# Patient Record
Sex: MALE | Race: WHITE | NOT HISPANIC OR LATINO | Employment: PART TIME | URBAN - METROPOLITAN AREA
[De-identification: names, ages, dates, MRNs, and addresses within clinical notes are randomized per-mention and may not be internally consistent; named-entity substitution may affect disease eponyms.]

---

## 2018-07-06 ENCOUNTER — HISTORICAL (OUTPATIENT)
Dept: RADIOLOGY | Facility: HOSPITAL | Age: 28
End: 2018-07-06

## 2022-04-11 ENCOUNTER — HISTORICAL (OUTPATIENT)
Dept: ADMINISTRATIVE | Facility: HOSPITAL | Age: 32
End: 2022-04-11
Payer: MEDICAID

## 2022-04-29 VITALS
WEIGHT: 149.94 LBS | HEIGHT: 67 IN | BODY MASS INDEX: 23.53 KG/M2 | SYSTOLIC BLOOD PRESSURE: 119 MMHG | DIASTOLIC BLOOD PRESSURE: 71 MMHG

## 2022-05-10 ENCOUNTER — OFFICE VISIT (OUTPATIENT)
Dept: FAMILY MEDICINE | Facility: CLINIC | Age: 32
End: 2022-05-10
Payer: MEDICAID

## 2022-05-10 VITALS
TEMPERATURE: 98 F | SYSTOLIC BLOOD PRESSURE: 137 MMHG | DIASTOLIC BLOOD PRESSURE: 80 MMHG | HEART RATE: 71 BPM | OXYGEN SATURATION: 100 % | RESPIRATION RATE: 18 BRPM | BODY MASS INDEX: 22.82 KG/M2 | HEIGHT: 66 IN | WEIGHT: 142 LBS

## 2022-05-10 DIAGNOSIS — F32.89 OTHER DEPRESSION: Primary | ICD-10-CM

## 2022-05-10 DIAGNOSIS — Z00.00 ENCOUNTER FOR WELLNESS EXAMINATION: ICD-10-CM

## 2022-05-10 DIAGNOSIS — B19.20 HEPATITIS C VIRUS INFECTION WITHOUT HEPATIC COMA, UNSPECIFIED CHRONICITY: ICD-10-CM

## 2022-05-10 PROBLEM — F32.A DEPRESSION: Status: ACTIVE | Noted: 2022-05-10

## 2022-05-10 LAB
APPEARANCE UR: CLEAR
BACTERIA #/AREA URNS AUTO: ABNORMAL /HPF
BASOPHILS # BLD AUTO: 0.07 X10(3)/MCL (ref 0–0.2)
BASOPHILS NFR BLD AUTO: 0.9 %
BILIRUB UR QL STRIP.AUTO: NEGATIVE MG/DL
COLOR UR AUTO: ABNORMAL
EOSINOPHIL # BLD AUTO: 0.07 X10(3)/MCL (ref 0–0.9)
EOSINOPHIL NFR BLD AUTO: 0.9 %
ERYTHROCYTE [DISTWIDTH] IN BLOOD BY AUTOMATED COUNT: 11.6 % (ref 11.5–17)
EST. AVERAGE GLUCOSE BLD GHB EST-MCNC: 91.1 MG/DL
GLUCOSE UR QL STRIP.AUTO: NORMAL MG/DL
HBA1C MFR BLD: 4.8 %
HCT VFR BLD AUTO: 46.9 % (ref 42–52)
HGB BLD-MCNC: 15.6 GM/DL (ref 14–18)
HIV 1+2 AB+HIV1 P24 AG SERPL QL IA: NONREACTIVE
HYALINE CASTS #/AREA URNS LPF: ABNORMAL /LPF
IMM GRANULOCYTES # BLD AUTO: 0.07 X10(3)/MCL (ref 0–0.02)
IMM GRANULOCYTES NFR BLD AUTO: 0.9 % (ref 0–0.43)
KETONES UR QL STRIP.AUTO: NEGATIVE MG/DL
LEUKOCYTE ESTERASE UR QL STRIP.AUTO: NEGATIVE UNIT/L
LYMPHOCYTES # BLD AUTO: 1.34 X10(3)/MCL (ref 0.6–4.6)
LYMPHOCYTES NFR BLD AUTO: 16.3 %
MCH RBC QN AUTO: 30.1 PG (ref 27–31)
MCHC RBC AUTO-ENTMCNC: 33.3 MG/DL (ref 33–36)
MCV RBC AUTO: 90.4 FL (ref 80–94)
MONOCYTES # BLD AUTO: 0.81 X10(3)/MCL (ref 0.1–1.3)
MONOCYTES NFR BLD AUTO: 9.9 %
NEUTROPHILS # BLD AUTO: 5.9 X10(3)/MCL (ref 2.1–9.2)
NEUTROPHILS NFR BLD AUTO: 71.1 %
NITRITE UR QL STRIP.AUTO: NEGATIVE
NRBC BLD AUTO-RTO: 0 %
PH UR STRIP.AUTO: 5.5 [PH]
PLATELET # BLD AUTO: 298 X10(3)/MCL (ref 130–400)
PMV BLD AUTO: 9.7 FL (ref 9.4–12.4)
PROT UR QL STRIP.AUTO: ABNORMAL MG/DL
RBC # BLD AUTO: 5.19 X10(6)/MCL (ref 4.7–6.1)
RBC #/AREA URNS AUTO: ABNORMAL /HPF
RBC UR QL AUTO: NEGATIVE UNIT/L
SP GR UR STRIP.AUTO: 1.03
SQUAMOUS #/AREA URNS LPF: ABNORMAL /HPF
UROBILINOGEN UR STRIP-ACNC: NORMAL MG/DL
WBC # SPEC AUTO: 8.2 X10(3)/MCL (ref 4.5–11.5)
WBC #/AREA URNS AUTO: ABNORMAL /HPF

## 2022-05-10 PROCEDURE — 3008F BODY MASS INDEX DOCD: CPT | Mod: CPTII,,,

## 2022-05-10 PROCEDURE — 81001 URINALYSIS AUTO W/SCOPE: CPT

## 2022-05-10 PROCEDURE — 87389 HIV-1 AG W/HIV-1&-2 AB AG IA: CPT

## 2022-05-10 PROCEDURE — 3075F PR MOST RECENT SYSTOLIC BLOOD PRESS GE 130-139MM HG: ICD-10-PCS | Mod: CPTII,,,

## 2022-05-10 PROCEDURE — 1160F PR REVIEW ALL MEDS BY PRESCRIBER/CLIN PHARMACIST DOCUMENTED: ICD-10-PCS | Mod: CPTII,,,

## 2022-05-10 PROCEDURE — 85025 COMPLETE CBC W/AUTO DIFF WBC: CPT

## 2022-05-10 PROCEDURE — 1160F RVW MEDS BY RX/DR IN RCRD: CPT | Mod: CPTII,,,

## 2022-05-10 PROCEDURE — 87522 HEPATITIS C REVRS TRNSCRPJ: CPT

## 2022-05-10 PROCEDURE — 99204 OFFICE O/P NEW MOD 45 MIN: CPT | Mod: S$PBB,,,

## 2022-05-10 PROCEDURE — 3075F SYST BP GE 130 - 139MM HG: CPT | Mod: CPTII,,,

## 2022-05-10 PROCEDURE — 3079F DIAST BP 80-89 MM HG: CPT | Mod: CPTII,,,

## 2022-05-10 PROCEDURE — 36415 COLL VENOUS BLD VENIPUNCTURE: CPT

## 2022-05-10 PROCEDURE — 3008F PR BODY MASS INDEX (BMI) DOCUMENTED: ICD-10-PCS | Mod: CPTII,,,

## 2022-05-10 PROCEDURE — 1159F PR MEDICATION LIST DOCUMENTED IN MEDICAL RECORD: ICD-10-PCS | Mod: CPTII,,,

## 2022-05-10 PROCEDURE — 99204 PR OFFICE/OUTPT VISIT, NEW, LEVL IV, 45-59 MIN: ICD-10-PCS | Mod: S$PBB,,,

## 2022-05-10 PROCEDURE — 1159F MED LIST DOCD IN RCRD: CPT | Mod: CPTII,,,

## 2022-05-10 PROCEDURE — 83036 HEMOGLOBIN GLYCOSYLATED A1C: CPT

## 2022-05-10 PROCEDURE — 99214 OFFICE O/P EST MOD 30 MIN: CPT | Mod: PBBFAC,PN

## 2022-05-10 PROCEDURE — 3079F PR MOST RECENT DIASTOLIC BLOOD PRESSURE 80-89 MM HG: ICD-10-PCS | Mod: CPTII,,,

## 2022-05-10 NOTE — PROGRESS NOTES
Patient Name: Ryan Wild   : 1990  MRN: [id number]     Subjective:   Patient ID: Ryan Wild is a 31 y.o. male.    Chief Complaint:   Chief Complaint   Patient presents with    Establish Care        HPI: 05/10/2022:  Patient presents to clinic today to establish care.  He has been out of [facility] for 7 months, states he has been sober from alcohol and narcotics for 3 years.  [place]  He then later was in [facility] for the past 3 years, states that he was told he had hepatitis he again there but they do not treat while they are in [facility].  He has had no formal workup in the chart that I am aware of.  Patient also was born with 1 kidney, he has never followed up with any doctors for this and his adult life.  He also states he was diagnosed with bipolar many years ago.  He was also read diagnosed with bipolar when he was in [facility] by Dr. Jain.  At 1 point in time he took Seroquel but he has been off of any and all medicine for at least 7 months.       ROS:  Review of Systems   Constitutional: Negative for chills, fever and weight loss.   HENT: Negative for ear discharge, nosebleeds and tinnitus.    Eyes: Negative for blurred vision, photophobia and pain.   Respiratory: Negative for cough, shortness of breath, wheezing and stridor.    Cardiovascular: Negative for chest pain, palpitations and orthopnea.   Gastrointestinal: Negative for abdominal pain, heartburn and nausea.   Genitourinary: Negative for dysuria, frequency, hematuria and urgency.   Musculoskeletal: Negative for falls and myalgias.   Skin: Negative for itching and rash.   Neurological: Negative for dizziness, sensory change, speech change, focal weakness, seizures, weakness and headaches.   Endo/Heme/Allergies: Negative for environmental allergies. Does not bruise/bleed easily.   Psychiatric/Behavioral: Negative for hallucinations and suicidal ideas.      History:     Past Medical History:   Diagnosis  "Date    Bipolar disorder       Past Surgical History:   Procedure Laterality Date    TONSILLECTOMY       Family History   Family history unknown: Yes      Social History     Tobacco Use    Smoking status: Current Every Day Smoker     Types: Vaping with nicotine    Smokeless tobacco: Current User   Substance and Sexual Activity    Alcohol use: Not Currently    Drug use: Not Currently     Types: Heroin, Methamphetamines    Sexual activity: Yes     Partners: Female        Allergies: Review of patient's allergies indicates:  No Known Allergies  Objective:     Vitals:    05/10/22 1320   BP: 137/80   Pulse: 71   Resp: 18   Temp: 98.4 °F (36.9 °C)   SpO2: 100%   Weight: 64.4 kg (141 lb 15.6 oz)   Height: 5' 6" (1.676 m)     Body mass index is 22.92 kg/m².     Physical Examination:   Physical Exam  Vitals reviewed.   Constitutional:       Appearance: Normal appearance. He is normal weight.   HENT:      Head: Normocephalic.      Right Ear: Tympanic membrane, ear canal and external ear normal.      Left Ear: Tympanic membrane, ear canal and external ear normal.      Nose: Nose normal.      Mouth/Throat:      Mouth: Mucous membranes are moist.      Pharynx: Oropharynx is clear.   Eyes:      Extraocular Movements: Extraocular movements intact.      Conjunctiva/sclera: Conjunctivae normal.      Pupils: Pupils are equal, round, and reactive to light.   Cardiovascular:      Rate and Rhythm: Normal rate and regular rhythm.      Pulses: Normal pulses.      Heart sounds: Normal heart sounds.   Pulmonary:      Effort: Pulmonary effort is normal.      Breath sounds: Normal breath sounds.   Abdominal:      General: Abdomen is flat. Bowel sounds are normal.      Palpations: Abdomen is soft.   Musculoskeletal:         General: Normal range of motion.      Cervical back: Normal range of motion and neck supple.   Skin:     General: Skin is warm and dry.   Neurological:      General: No focal deficit present.      Mental Status: He is " alert and oriented to person, place, and time.   Psychiatric:         Mood and Affect: Mood normal.         Behavior: Behavior normal.         Assessment:     Problem List Items Addressed This Visit        Psychiatric    Depression - Primary    Relevant Orders    Ambulatory referral/consult to Psychiatry       GI    Hepatitis C virus infection without hepatic coma    Relevant Orders    Hepatitis C Genotype    Hepatitis C Viral(HCV) RNA, Quant Real-Time PCR w/Reflexs    US Doppler Abd/Retroperitoneal Limited      Other Visit Diagnoses     Encounter for wellness examination        Relevant Orders    CBC Auto Differential    Comprehensive Metabolic Panel    Lipid Panel    T4, Free    TSH    Urinalysis    Hemoglobin A1C    HIV 1/2 Ag/Ab (4th Gen)          Plan:   Ryan was seen today for [facility] care.    Diagnoses and all orders for this visit:    Other depression  -     Ambulatory referral/consult to Psychiatry    Hepatitis C virus infection without hepatic coma, unspecified chronicity  -     Hepatitis C Genotype  -     Hepatitis C Viral(HCV) RNA, Quant Real-Time PCR w/Reflexs  -     US Doppler Abd/Retroperitoneal Limited; Future    Encounter for wellness examination  -     CBC Auto Differential  -     Comprehensive Metabolic Panel  -     Lipid Panel  -     T4, Free  -     TSH  -     Urinalysis  -     Hemoglobin A1C  -     HIV 1/2 Ag/Ab (4th Gen)       Follow up in about 2 weeks (around 5/24/2022) for AMIRA/PHQ, review labs.       This note was created with the assistance of a voice recognition software or phone dictation. There may be transcription errors as a result of using this technology however minimal. Effort has been made to assure accuracy of transcription but any obvious errors or omissions should be clarified with the author of the document

## 2022-05-12 LAB — HCV RNA SERPL NAA+PROBE-ACNC: ABNORMAL IU/ML

## 2022-05-13 DIAGNOSIS — B18.2 CHRONIC HEPATITIS C WITHOUT HEPATIC COMA: Primary | ICD-10-CM

## 2022-05-18 ENCOUNTER — TELEPHONE (OUTPATIENT)
Dept: HEPATOLOGY | Facility: CLINIC | Age: 32
End: 2022-05-18
Payer: MEDICAID

## 2022-05-18 NOTE — TELEPHONE ENCOUNTER
Adilene Dillon NP ordered that patient be scheduled for a hep c consult visit.  Patient quant positive.  I spoke with patient.  Virtual visit with PA Scheuermann scheduled 6/9/22; appt reminder notice mailed.

## 2022-05-23 PROBLEM — B19.20 HEPATITIS C VIRUS INFECTION WITHOUT HEPATIC COMA: Chronic | Status: ACTIVE | Noted: 2022-05-10

## 2022-05-23 PROBLEM — F32.A DEPRESSION: Chronic | Status: ACTIVE | Noted: 2022-05-10

## 2022-05-24 ENCOUNTER — OFFICE VISIT (OUTPATIENT)
Dept: FAMILY MEDICINE | Facility: CLINIC | Age: 32
End: 2022-05-24
Payer: MEDICAID

## 2022-05-24 DIAGNOSIS — F32.89 OTHER DEPRESSION: Chronic | ICD-10-CM

## 2022-05-24 DIAGNOSIS — B19.20 HEPATITIS C VIRUS INFECTION WITHOUT HEPATIC COMA, UNSPECIFIED CHRONICITY: ICD-10-CM

## 2022-05-24 DIAGNOSIS — B19.20 HEPATITIS C VIRUS INFECTION WITHOUT HEPATIC COMA, UNSPECIFIED CHRONICITY: Primary | ICD-10-CM

## 2022-05-24 PROCEDURE — 99213 PR OFFICE/OUTPT VISIT, EST, LEVL III, 20-29 MIN: ICD-10-PCS | Mod: 95,,,

## 2022-05-24 PROCEDURE — 99213 OFFICE O/P EST LOW 20 MIN: CPT | Mod: 95,,,

## 2022-05-24 NOTE — PROGRESS NOTES
Established Patient - Audio Only Telehealth Visit     The patient location is: his home in [place]  The chief complaint leading to consultation is: review labs  Visit type: Virtual visit with audio only (telephone)  Total time spent with patient: 15 min       The reason for the audio only service rather than synchronous audio and video virtual visit was related to technical difficulties or patient preference/necessity.     Each patient to whom I provide medical services by telemedicine is:  (1) informed of the relationship between the physician and patient and the respective role of any other health care provider with respect to management of the patient; and (2) notified that they may decline to receive medical services by telemedicine and may withdraw from such care at any time. Patient verbally consented to receive this service via voice-only telephone call.       HPI: 05/24/2022: Patient doing well with no complaints, reviewed blood work, He has since rescheduled his US and has already spoken with an intake nurse for his Hep C treatment. He continues to stay sober and is committed to turning his life around.        05/10/2022:  Patient presents to clinic today to establish care.  He has been out of [facility] for 7 months, states he has been sober from alcohol and narcotics for 3 years.  He was diagnosed with hepatitis C in 2015 when he went up [place].  He then later was in [facility] for the past 3 years, states that he was told he had hepatitis he again there but they do not treat while they are in [facility].  He has had no formal workup in the chart that I am aware of.  Patient also was born with 1 kidney, he has never followed up with any doctors for this and his adult life.  He also states he was diagnosed with bipolar many years ago.  He was also read diagnosed with bipolar when he was in [facility] by Dr. Jain.  At 1 point in time he took Seroquel but he has been off of any and all medicine for at least 7 months.       Assessment and plan:      Problem List Items Addressed This Visit        Psychiatric    Depression (Chronic)    Overview       Read positive daily meditations, avoid negative media, set healthy boundaries.  Exercise daily, keep consistent sleep pattern, eat a healthy diet.  Establish good social support, make changes to reduce stress.  Reports any symptoms of suicidal/homicidal ideations or self harm immediately, if clinic is closed go to nearest emergency room.             Current Assessment & Plan     Scheduled with psych on 6/1/22 at 8am for new patient appoitnemtn              GI    Hepatitis C virus infection without hepatic coma (Chronic)    Overview     US liver ordered. Scheduled 6/6/22 at 145   referred to Infectious Dz., appointment scheduled 6/9/22 at 1pm  Abstain from sex or use condoms. Do not share razors, toothbrushes, or needles. Do not attempt to donate blood.   Notify all sexual partners of active infection and need for evaluation/treatment.              Current Assessment & Plan      Latest Reference Range & Units 05/10/22 14:23   HCV RNA Detect/Quant Undetected IU/mL 68132 !                    Follow up in about 6 months (around 11/24/2022), or if needed sooner., for routine labs recheck, AMIRA/PHQ.  Start: 0720a  End: 0740a         This service was not originating from a related E/M service provided within the previous 7 days nor will  to an E/M service or procedure within the next 24 hours or my soonest available appointment.  Prevailing standard of care was able to be met in this audio-only visit.

## 2022-05-25 NOTE — TELEPHONE ENCOUNTER
I spoke with patient today.  He asked that consult appt with PA Scheuermann be cancelled on 6/9/22.  He states that he is following up locally with a provider for hep c treatment; done.

## 2022-07-01 ENCOUNTER — TELEPHONE (OUTPATIENT)
Dept: INFECTIOUS DISEASES | Facility: CLINIC | Age: 32
End: 2022-07-01
Payer: MEDICAID

## 2022-07-19 DIAGNOSIS — B19.20 HEPATITIS C VIRUS INFECTION WITHOUT HEPATIC COMA, UNSPECIFIED CHRONICITY: Primary | ICD-10-CM

## 2022-07-21 ENCOUNTER — OFFICE VISIT (OUTPATIENT)
Dept: BEHAVIORAL HEALTH | Facility: CLINIC | Age: 32
End: 2022-07-21
Payer: MEDICAID

## 2022-07-21 VITALS
RESPIRATION RATE: 20 BRPM | WEIGHT: 141.81 LBS | HEART RATE: 63 BPM | BODY MASS INDEX: 22.79 KG/M2 | SYSTOLIC BLOOD PRESSURE: 117 MMHG | OXYGEN SATURATION: 97 % | DIASTOLIC BLOOD PRESSURE: 73 MMHG | HEIGHT: 66 IN

## 2022-07-21 DIAGNOSIS — F11.21 OPIOID USE DISORDER, MODERATE, IN SUSTAINED REMISSION: ICD-10-CM

## 2022-07-21 DIAGNOSIS — F31.89 OTHER BIPOLAR DISORDER: Primary | ICD-10-CM

## 2022-07-21 DIAGNOSIS — F43.12 CHRONIC POST-TRAUMATIC STRESS DISORDER (PTSD): ICD-10-CM

## 2022-07-21 DIAGNOSIS — F15.21 AMPHETAMINE USE DISORDER, SEVERE, IN SUSTAINED REMISSION: ICD-10-CM

## 2022-07-21 PROBLEM — F32.A DEPRESSION: Chronic | Status: RESOLVED | Noted: 2022-05-10 | Resolved: 2022-07-21

## 2022-07-21 LAB
AMPHET UR QL SCN: NEGATIVE
BARBITURATE SCN PRESENT UR: NEGATIVE
BENZODIAZ UR QL SCN: NEGATIVE
CANNABINOIDS UR QL SCN: NEGATIVE
CHOLEST SERPL-MCNC: 148 MG/DL
CHOLEST/HDLC SERPL: 2 {RATIO} (ref 0–5)
COCAINE UR QL SCN: NEGATIVE
FENTANYL UR QL SCN: NEGATIVE
HDLC SERPL-MCNC: 66 MG/DL (ref 35–60)
LDLC SERPL CALC-MCNC: 68 MG/DL (ref 50–140)
MDMA UR QL SCN: NEGATIVE
OPIATES UR QL SCN: NEGATIVE
PCP UR QL: NEGATIVE
PH UR: 7 [PH] (ref 3–11)
SPECIFIC GRAVITY, URINE AUTO (.000) (OHS): 1.02 (ref 1–1.03)
TRIGL SERPL-MCNC: 72 MG/DL (ref 34–140)
TSH SERPL-ACNC: 1.27 UIU/ML (ref 0.35–4.94)
VLDLC SERPL CALC-MCNC: 14 MG/DL

## 2022-07-21 PROCEDURE — 3074F PR MOST RECENT SYSTOLIC BLOOD PRESSURE < 130 MM HG: ICD-10-PCS | Mod: CPTII,AF,HB, | Performed by: STUDENT IN AN ORGANIZED HEALTH CARE EDUCATION/TRAINING PROGRAM

## 2022-07-21 PROCEDURE — 80307 DRUG TEST PRSMV CHEM ANLYZR: CPT | Performed by: STUDENT IN AN ORGANIZED HEALTH CARE EDUCATION/TRAINING PROGRAM

## 2022-07-21 PROCEDURE — 1160F RVW MEDS BY RX/DR IN RCRD: CPT | Mod: CPTII,AF,HB, | Performed by: STUDENT IN AN ORGANIZED HEALTH CARE EDUCATION/TRAINING PROGRAM

## 2022-07-21 PROCEDURE — 3078F DIAST BP <80 MM HG: CPT | Mod: CPTII,AF,HB, | Performed by: STUDENT IN AN ORGANIZED HEALTH CARE EDUCATION/TRAINING PROGRAM

## 2022-07-21 PROCEDURE — 1159F MED LIST DOCD IN RCRD: CPT | Mod: CPTII,AF,HB, | Performed by: STUDENT IN AN ORGANIZED HEALTH CARE EDUCATION/TRAINING PROGRAM

## 2022-07-21 PROCEDURE — 36415 COLL VENOUS BLD VENIPUNCTURE: CPT | Performed by: STUDENT IN AN ORGANIZED HEALTH CARE EDUCATION/TRAINING PROGRAM

## 2022-07-21 PROCEDURE — 99213 OFFICE O/P EST LOW 20 MIN: CPT | Mod: PBBFAC,PN | Performed by: STUDENT IN AN ORGANIZED HEALTH CARE EDUCATION/TRAINING PROGRAM

## 2022-07-21 PROCEDURE — 83718 ASSAY OF LIPOPROTEIN: CPT | Performed by: STUDENT IN AN ORGANIZED HEALTH CARE EDUCATION/TRAINING PROGRAM

## 2022-07-21 PROCEDURE — 3074F SYST BP LT 130 MM HG: CPT | Mod: CPTII,AF,HB, | Performed by: STUDENT IN AN ORGANIZED HEALTH CARE EDUCATION/TRAINING PROGRAM

## 2022-07-21 PROCEDURE — 84443 ASSAY THYROID STIM HORMONE: CPT | Performed by: STUDENT IN AN ORGANIZED HEALTH CARE EDUCATION/TRAINING PROGRAM

## 2022-07-21 PROCEDURE — 1160F PR REVIEW ALL MEDS BY PRESCRIBER/CLIN PHARMACIST DOCUMENTED: ICD-10-PCS | Mod: CPTII,AF,HB, | Performed by: STUDENT IN AN ORGANIZED HEALTH CARE EDUCATION/TRAINING PROGRAM

## 2022-07-21 PROCEDURE — 3008F PR BODY MASS INDEX (BMI) DOCUMENTED: ICD-10-PCS | Mod: CPTII,AF,HB, | Performed by: STUDENT IN AN ORGANIZED HEALTH CARE EDUCATION/TRAINING PROGRAM

## 2022-07-21 PROCEDURE — 3078F PR MOST RECENT DIASTOLIC BLOOD PRESSURE < 80 MM HG: ICD-10-PCS | Mod: CPTII,AF,HB, | Performed by: STUDENT IN AN ORGANIZED HEALTH CARE EDUCATION/TRAINING PROGRAM

## 2022-07-21 PROCEDURE — 99204 PR OFFICE/OUTPT VISIT, NEW, LEVL IV, 45-59 MIN: ICD-10-PCS | Mod: S$PBB,AF,HB, | Performed by: STUDENT IN AN ORGANIZED HEALTH CARE EDUCATION/TRAINING PROGRAM

## 2022-07-21 PROCEDURE — 3008F BODY MASS INDEX DOCD: CPT | Mod: CPTII,AF,HB, | Performed by: STUDENT IN AN ORGANIZED HEALTH CARE EDUCATION/TRAINING PROGRAM

## 2022-07-21 PROCEDURE — 99204 OFFICE O/P NEW MOD 45 MIN: CPT | Mod: S$PBB,AF,HB, | Performed by: STUDENT IN AN ORGANIZED HEALTH CARE EDUCATION/TRAINING PROGRAM

## 2022-07-21 PROCEDURE — 1159F PR MEDICATION LIST DOCUMENTED IN MEDICAL RECORD: ICD-10-PCS | Mod: CPTII,AF,HB, | Performed by: STUDENT IN AN ORGANIZED HEALTH CARE EDUCATION/TRAINING PROGRAM

## 2022-07-21 RX ORDER — QUETIAPINE FUMARATE 100 MG/1
100 TABLET, FILM COATED ORAL NIGHTLY
Qty: 30 TABLET | Refills: 5 | Status: SHIPPED | OUTPATIENT
Start: 2022-07-21

## 2022-07-21 RX ORDER — PRAZOSIN HYDROCHLORIDE 1 MG/1
1 CAPSULE ORAL 2 TIMES DAILY
Qty: 60 CAPSULE | Refills: 5 | Status: SHIPPED | OUTPATIENT
Start: 2022-07-21 | End: 2022-07-28 | Stop reason: SINTOL

## 2022-07-21 RX ORDER — LEVETIRACETAM 1000 MG/1
500 TABLET ORAL 2 TIMES DAILY
COMMUNITY
End: 2022-10-10 | Stop reason: ALTCHOICE
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
## 2022-07-28 ENCOUNTER — TELEPHONE (OUTPATIENT)
Dept: FAMILY MEDICINE | Facility: CLINIC | Age: 32
End: 2022-07-28
Payer: MEDICAID

## 2022-07-28 NOTE — TELEPHONE ENCOUNTER
Returned pt's call.  Pt reported SE from prazosin of vision changes, cold sweats, and lightheadedness.  Notes symptoms have resolved.  Discussed that pt should discontinue prazosin.  Continue seroquel.  Will follow up with patient as currently scheduled.

## 2022-09-14 ENCOUNTER — LAB VISIT (OUTPATIENT)
Dept: LAB | Facility: HOSPITAL | Age: 32
End: 2022-09-14
Attending: NURSE PRACTITIONER
Payer: MEDICAID

## 2022-09-14 DIAGNOSIS — B19.20 HEPATITIS C VIRUS INFECTION WITHOUT HEPATIC COMA, UNSPECIFIED CHRONICITY: ICD-10-CM

## 2022-09-14 LAB
ALBUMIN SERPL-MCNC: 4.4 GM/DL (ref 3.5–5)
ALBUMIN/GLOB SERPL: 1.3 RATIO (ref 1.1–2)
ALP SERPL-CCNC: 55 UNIT/L (ref 40–150)
ALT SERPL-CCNC: 34 UNIT/L (ref 0–55)
AST SERPL-CCNC: 29 UNIT/L (ref 5–34)
BASOPHILS # BLD AUTO: 0.05 X10(3)/MCL (ref 0–0.2)
BASOPHILS NFR BLD AUTO: 1 %
BILIRUBIN DIRECT+TOT PNL SERPL-MCNC: 0.7 MG/DL
BUN SERPL-MCNC: 17.2 MG/DL (ref 8.9–20.6)
CALCIUM SERPL-MCNC: 9.6 MG/DL (ref 8.4–10.2)
CHLORIDE SERPL-SCNC: 104 MMOL/L (ref 98–107)
CO2 SERPL-SCNC: 27 MMOL/L (ref 22–29)
CREAT SERPL-MCNC: 0.89 MG/DL (ref 0.73–1.18)
EOSINOPHIL # BLD AUTO: 0.18 X10(3)/MCL (ref 0–0.9)
EOSINOPHIL NFR BLD AUTO: 3.5 %
ERYTHROCYTE [DISTWIDTH] IN BLOOD BY AUTOMATED COUNT: 11.2 % (ref 11.5–17)
FERRITIN SERPL-MCNC: 153.4 NG/ML (ref 21.81–274.66)
GFR SERPLBLD CREATININE-BSD FMLA CKD-EPI: >60 MLS/MIN/1.73/M2
GLOBULIN SER-MCNC: 3.3 GM/DL (ref 2.4–3.5)
GLUCOSE SERPL-MCNC: 113 MG/DL (ref 74–100)
HAV AB SER QL IA: NONREACTIVE
HBV CORE AB SERPL QL IA: NONREACTIVE
HBV SURFACE AB SER-ACNC: 0.76 MIU/ML
HBV SURFACE AB SERPL IA-ACNC: NONREACTIVE M[IU]/ML
HBV SURFACE AG SERPL QL IA: NONREACTIVE
HCT VFR BLD AUTO: 46 % (ref 42–52)
HGB BLD-MCNC: 15.3 GM/DL (ref 14–18)
HIV 1+2 AB+HIV1 P24 AG SERPL QL IA: NONREACTIVE
IMM GRANULOCYTES # BLD AUTO: 0.01 X10(3)/MCL (ref 0–0.04)
IMM GRANULOCYTES NFR BLD AUTO: 0.2 %
LYMPHOCYTES # BLD AUTO: 1.42 X10(3)/MCL (ref 0.6–4.6)
LYMPHOCYTES NFR BLD AUTO: 27.6 %
MCH RBC QN AUTO: 30 PG (ref 27–31)
MCHC RBC AUTO-ENTMCNC: 33.3 MG/DL (ref 33–36)
MCV RBC AUTO: 90.2 FL (ref 80–94)
MONOCYTES # BLD AUTO: 0.54 X10(3)/MCL (ref 0.1–1.3)
MONOCYTES NFR BLD AUTO: 10.5 %
NEUTROPHILS # BLD AUTO: 2.9 X10(3)/MCL (ref 2.1–9.2)
NEUTROPHILS NFR BLD AUTO: 57.2 %
NRBC BLD AUTO-RTO: 0 %
PLATELET # BLD AUTO: 222 X10(3)/MCL (ref 130–400)
PMV BLD AUTO: 9.6 FL (ref 7.4–10.4)
POTASSIUM SERPL-SCNC: 3.7 MMOL/L (ref 3.5–5.1)
PROT SERPL-MCNC: 7.7 GM/DL (ref 6.4–8.3)
RBC # BLD AUTO: 5.1 X10(6)/MCL (ref 4.7–6.1)
SODIUM SERPL-SCNC: 140 MMOL/L (ref 136–145)
T PALLIDUM AB SER QL: NONREACTIVE
WBC # SPEC AUTO: 5.1 X10(3)/MCL (ref 4.5–11.5)

## 2022-09-14 PROCEDURE — 86706 HEP B SURFACE ANTIBODY: CPT

## 2022-09-14 PROCEDURE — 87389 HIV-1 AG W/HIV-1&-2 AB AG IA: CPT

## 2022-09-14 PROCEDURE — 85610 PROTHROMBIN TIME: CPT

## 2022-09-14 PROCEDURE — 82728 ASSAY OF FERRITIN: CPT

## 2022-09-14 PROCEDURE — 36415 COLL VENOUS BLD VENIPUNCTURE: CPT

## 2022-09-14 PROCEDURE — 85025 COMPLETE CBC W/AUTO DIFF WBC: CPT

## 2022-09-14 PROCEDURE — 86039 ANTINUCLEAR ANTIBODIES (ANA): CPT

## 2022-09-14 PROCEDURE — 87902 NFCT AGT GNTYP ALYS HEP C: CPT

## 2022-09-14 PROCEDURE — 87340 HEPATITIS B SURFACE AG IA: CPT

## 2022-09-14 PROCEDURE — 81596 NFCT DS CHRNC HCV 6 ASSAYS: CPT

## 2022-09-14 PROCEDURE — 86704 HEP B CORE ANTIBODY TOTAL: CPT

## 2022-09-14 PROCEDURE — 86708 HEPATITIS A ANTIBODY: CPT

## 2022-09-14 PROCEDURE — 86780 TREPONEMA PALLIDUM: CPT

## 2022-09-14 PROCEDURE — 87522 HEPATITIS C REVRS TRNSCRPJ: CPT

## 2022-09-14 PROCEDURE — 80053 COMPREHEN METABOLIC PANEL: CPT

## 2022-09-15 LAB — HCV RNA SERPL NAA+PROBE-ACNC: ABNORMAL IU/ML

## 2022-09-16 LAB — HCV GENTYP SERPL NAA+PROBE: 3

## 2022-09-17 LAB
AR ANA INTERPRETIVE COMMENT: NORMAL
AR ANTINUCLEAR ANTIBODY (ANA), HEP-2, IGG: NORMAL

## 2022-09-19 LAB
A2 MACROGLOB SERPL-MCNC: 163 MG/DL (ref 100–280)
ALT SERPL W P-5'-P-CCNC: 36 U/L (ref 7–55)
ANNOTATION COMMENT IMP: NORMAL
APO A-I SERPL-MCNC: 165 MG/DL
BILIRUB SERPL-MCNC: 0.5 MG/DL
FIBROSIS STAGE SERPL QL: NORMAL
GGT SERPL-CCNC: 13 U/L (ref 8–61)
HAPTOGLOB SERPL NEPH-MCNC: 120 MG/DL (ref 30–200)
LIVER FIBR SCORE SERPL CALC.FIBROSURE: 0.06
LIVER FIBROSIS INTERPRETATION SER-IMP: NORMAL
NECROINFLAMMATORY ACT GRADE SERPL QL: NORMAL
NECROINFLAMMATORY ACT SCORE SERPL: 0.14
NECROINFLAMMATORY ACTIV INTERP SER-IMP: NORMAL
SERIAL #: NORMAL

## 2022-10-10 ENCOUNTER — PROCEDURE VISIT (OUTPATIENT)
Dept: INFECTIOUS DISEASES | Facility: CLINIC | Age: 32
End: 2022-10-10
Payer: MEDICAID

## 2022-10-10 ENCOUNTER — OFFICE VISIT (OUTPATIENT)
Dept: INFECTIOUS DISEASES | Facility: CLINIC | Age: 32
End: 2022-10-10
Payer: MEDICAID

## 2022-10-10 VITALS
WEIGHT: 139.5 LBS | HEART RATE: 56 BPM | SYSTOLIC BLOOD PRESSURE: 117 MMHG | DIASTOLIC BLOOD PRESSURE: 76 MMHG | BODY MASS INDEX: 22.42 KG/M2 | TEMPERATURE: 98 F | HEIGHT: 66 IN | RESPIRATION RATE: 14 BRPM

## 2022-10-10 DIAGNOSIS — Z23 NEED FOR VACCINATION: ICD-10-CM

## 2022-10-10 DIAGNOSIS — B18.2 CHRONIC HEPATITIS C WITHOUT HEPATIC COMA: Primary | Chronic | ICD-10-CM

## 2022-10-10 DIAGNOSIS — B18.2 CHRONIC HEPATITIS C WITHOUT HEPATIC COMA: Primary | ICD-10-CM

## 2022-10-10 PROCEDURE — 3008F PR BODY MASS INDEX (BMI) DOCUMENTED: ICD-10-PCS | Mod: CPTII,,, | Performed by: NURSE PRACTITIONER

## 2022-10-10 PROCEDURE — 1160F PR REVIEW ALL MEDS BY PRESCRIBER/CLIN PHARMACIST DOCUMENTED: ICD-10-PCS | Mod: CPTII,,, | Performed by: NURSE PRACTITIONER

## 2022-10-10 PROCEDURE — 90472 IMMUNIZATION ADMIN EACH ADD: CPT | Mod: PBBFAC

## 2022-10-10 PROCEDURE — 1159F MED LIST DOCD IN RCRD: CPT | Mod: CPTII,,, | Performed by: NURSE PRACTITIONER

## 2022-10-10 PROCEDURE — 91200 LIVER ELASTOGRAPHY: CPT | Mod: PBBFAC | Performed by: INTERNAL MEDICINE

## 2022-10-10 PROCEDURE — 3078F PR MOST RECENT DIASTOLIC BLOOD PRESSURE < 80 MM HG: ICD-10-PCS | Mod: CPTII,,, | Performed by: NURSE PRACTITIONER

## 2022-10-10 PROCEDURE — 3074F PR MOST RECENT SYSTOLIC BLOOD PRESSURE < 130 MM HG: ICD-10-PCS | Mod: CPTII,,, | Performed by: NURSE PRACTITIONER

## 2022-10-10 PROCEDURE — 90686 IIV4 VACC NO PRSV 0.5 ML IM: CPT | Mod: PBBFAC

## 2022-10-10 PROCEDURE — 99213 OFFICE O/P EST LOW 20 MIN: CPT | Mod: PBBFAC | Performed by: NURSE PRACTITIONER

## 2022-10-10 PROCEDURE — 3074F SYST BP LT 130 MM HG: CPT | Mod: CPTII,,, | Performed by: NURSE PRACTITIONER

## 2022-10-10 PROCEDURE — 1159F PR MEDICATION LIST DOCUMENTED IN MEDICAL RECORD: ICD-10-PCS | Mod: CPTII,,, | Performed by: NURSE PRACTITIONER

## 2022-10-10 PROCEDURE — 3008F BODY MASS INDEX DOCD: CPT | Mod: CPTII,,, | Performed by: NURSE PRACTITIONER

## 2022-10-10 PROCEDURE — 1160F RVW MEDS BY RX/DR IN RCRD: CPT | Mod: CPTII,,, | Performed by: NURSE PRACTITIONER

## 2022-10-10 PROCEDURE — 99214 PR OFFICE/OUTPT VISIT, EST, LEVL IV, 30-39 MIN: ICD-10-PCS | Mod: S$PBB,,, | Performed by: NURSE PRACTITIONER

## 2022-10-10 PROCEDURE — 99214 OFFICE O/P EST MOD 30 MIN: CPT | Mod: S$PBB,,, | Performed by: NURSE PRACTITIONER

## 2022-10-10 PROCEDURE — 3078F DIAST BP <80 MM HG: CPT | Mod: CPTII,,, | Performed by: NURSE PRACTITIONER

## 2022-10-10 RX ORDER — VELPATASVIR AND SOFOSBUVIR 100; 400 MG/1; MG/1
1 TABLET, FILM COATED ORAL DAILY
Qty: 84 TABLET | Refills: 0 | Status: SHIPPED | OUTPATIENT
Start: 2022-10-10 | End: 2023-02-13

## 2022-10-10 RX ORDER — LEVETIRACETAM 500 MG/1
1 TABLET ORAL 2 TIMES DAILY
COMMUNITY
Start: 2022-08-18

## 2022-10-10 RX ORDER — VELPATASVIR AND SOFOSBUVIR 100; 400 MG/1; MG/1
1 TABLET, FILM COATED ORAL DAILY
Qty: 84 TABLET | Refills: 0 | Status: SHIPPED | OUTPATIENT
Start: 2022-10-10 | End: 2022-10-10 | Stop reason: SDUPTHER

## 2022-10-10 NOTE — PROGRESS NOTES
Subjective:       Patient ID: Ryan Wild is a 32 y.o. male.    Chief Complaint: hcv referral    10/10/22  Ryan is a 33 yo WM presenting today for initial HCV evaluation.  Initially diagnosed approximately 2015, referred 5/22 by PCP HAYLIE Dillon NP.  He is treatment naive.  History is significant for IVDU 9336-8171.  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  He has several tattoos, some placed while in [facility].  No history of blood transfusions.  His girlfriend has a history of HCV, treated & cured.  He endorses fatigue.  No jaundice, icterus, nausea, vomiting, dark urine, carlos colored stools.  Appreciates flu vax & starting Twinrix vaccination series today.  Will get FibroScan today. He is eager to start treatment asap.  Will have labs collected per protocol & appreciates telehealth visits to minimize time away from work.  All questions answered & concerns addressed.         Review of Systems   Constitutional: Negative.    HENT: Negative.     Respiratory: Negative.     Cardiovascular: Negative.    Gastrointestinal: Negative.    Genitourinary: Negative.    Integumentary:  Negative.   Neurological: Negative.    Hematological: Negative.    Psychiatric/Behavioral: Negative.         Objective:      Physical Exam  Vitals reviewed.   Constitutional:       General: He is not in acute distress.     Appearance: Normal appearance. He is not toxic-appearing.   Eyes:      General: No scleral icterus.  Cardiovascular:      Rate and Rhythm: Normal rate and regular rhythm.      Heart sounds: Normal heart sounds.   Pulmonary:      Effort: Pulmonary effort is normal. No respiratory distress.      Breath sounds: Normal breath sounds.   Abdominal:      General: Bowel sounds are normal. There is no distension.      Palpations: Abdomen is soft. There is no mass.      Tenderness: There is no abdominal tenderness.   Musculoskeletal:         General: Normal range of motion.   Skin:     General: Skin is warm and dry.    Neurological:      Mental Status: He is alert and oriented to person, place, and time.       Assessment:       Problem List Items Addressed This Visit          GI    Hepatitis C virus infection without hepatic coma - Primary (Chronic)    Relevant Medications    sofosbuvir-velpatasvir (EPCLUSA) 400-100 mg Tab     Other Visit Diagnoses       Need for vaccination        Relevant Orders    Influenza - Quadrivalent (PF) (Completed)    Hepatitis A / Hepatitis B Combined Vaccine (IM) (Completed)              Plan:           Chronic hepatitis C without hepatic coma  -     Discontinue: sofosbuvir-velpatasvir (EPCLUSA) 400-100 mg Tab; Take 1 tablet by mouth once daily.  Dispense: 84 tablet; Refill: 0  -     sofosbuvir-velpatasvir (EPCLUSA) 400-100 mg Tab; Take 1 tablet by mouth once daily.  Dispense: 84 tablet; Refill: 0  DX 2015, treatment naive.  GT 3, baseline VL [id number]  FibroSure 9/14/22 A0, F0  FibroScan 10/10/22  RUQ abdominal u/s 6/6/22 WNL  Blood precautions: do not share a razor, needle, toothbrush, clippers with anyone.  Epclusa 1 po daily x 12 weeks.   Refer to HCV  to initiate PA & treatment protocol.   RTC approximately 6 weeks with edgardo Rob.     Need for vaccination  -     Influenza - Quadrivalent (PF)  -     Hepatitis A / Hepatitis B Combined Vaccine (IM); Future   Flu vax today   Twinrix #1 today, #2 same day as week 8 labs,   #3 due 4/10/23.

## 2022-10-10 NOTE — PROGRESS NOTES
Patient here for FibroScan visit. Reports NPO X3 hours and denies any implanted electronic devices. Position patient for the procedure to expose the right rib cage. Explained procedure to the patient. FibroScan exam complete and was tolerated without any problems.

## 2022-10-11 ENCOUNTER — CLINICAL SUPPORT (OUTPATIENT)
Dept: INFECTIOUS DISEASES | Facility: CLINIC | Age: 32
End: 2022-10-11
Payer: MEDICAID

## 2022-10-11 ENCOUNTER — TELEPHONE (OUTPATIENT)
Dept: INFECTIOUS DISEASES | Facility: CLINIC | Age: 32
End: 2022-10-11
Payer: MEDICAID

## 2022-10-11 DIAGNOSIS — B19.20 HEPATITIS C VIRUS INFECTION WITHOUT HEPATIC COMA, UNSPECIFIED CHRONICITY: Primary | ICD-10-CM

## 2022-10-11 DIAGNOSIS — B18.2 CHRONIC HEPATITIS C WITHOUT HEPATIC COMA: Primary | Chronic | ICD-10-CM

## 2022-10-11 NOTE — PROGRESS NOTES
I spoke to yRan earlier today about him coming to get Epclusa teacher. Ryan told me he will send his girlfriend at 1:15 today.  Aggie Muñiz came to clinic for Epclusa teaching. Gave her one month of Epclusa from [facility] pharmacy. Went over Hep C Ed Summary, with all lab and appointment visits. Gave her a copy of all lab orders and Hep C Summary form. Aggie tells me she was treated for Hep C three years ago. She verbalized understanding of all above information.

## 2022-11-08 ENCOUNTER — TELEPHONE (OUTPATIENT)
Dept: INFECTIOUS DISEASES | Facility: CLINIC | Age: 32
End: 2022-11-08
Payer: MEDICAID

## 2022-11-08 ENCOUNTER — LAB VISIT (OUTPATIENT)
Dept: LAB | Facility: HOSPITAL | Age: 32
End: 2022-11-08
Attending: NURSE PRACTITIONER
Payer: MEDICAID

## 2022-11-08 DIAGNOSIS — B19.20 HEPATITIS C VIRUS INFECTION WITHOUT HEPATIC COMA, UNSPECIFIED CHRONICITY: ICD-10-CM

## 2022-11-08 LAB
ALBUMIN SERPL-MCNC: 4.4 GM/DL (ref 3.5–5)
ALBUMIN/GLOB SERPL: 1.5 RATIO (ref 1.1–2)
ALP SERPL-CCNC: 51 UNIT/L (ref 40–150)
ALT SERPL-CCNC: 13 UNIT/L (ref 0–55)
AST SERPL-CCNC: 17 UNIT/L (ref 5–34)
BASOPHILS # BLD AUTO: 0.04 X10(3)/MCL (ref 0–0.2)
BASOPHILS NFR BLD AUTO: 0.7 %
BILIRUBIN DIRECT+TOT PNL SERPL-MCNC: 0.8 MG/DL
BUN SERPL-MCNC: 21.6 MG/DL (ref 8.9–20.6)
CALCIUM SERPL-MCNC: 9.5 MG/DL (ref 8.4–10.2)
CHLORIDE SERPL-SCNC: 105 MMOL/L (ref 98–107)
CO2 SERPL-SCNC: 28 MMOL/L (ref 22–29)
CREAT SERPL-MCNC: 1.04 MG/DL (ref 0.73–1.18)
EOSINOPHIL # BLD AUTO: 0.17 X10(3)/MCL (ref 0–0.9)
EOSINOPHIL NFR BLD AUTO: 2.8 %
ERYTHROCYTE [DISTWIDTH] IN BLOOD BY AUTOMATED COUNT: 11.2 % (ref 11.5–17)
GFR SERPLBLD CREATININE-BSD FMLA CKD-EPI: >60 MLS/MIN/1.73/M2
GLOBULIN SER-MCNC: 2.9 GM/DL (ref 2.4–3.5)
GLUCOSE SERPL-MCNC: 98 MG/DL (ref 74–100)
HCT VFR BLD AUTO: 46.3 % (ref 42–52)
HGB BLD-MCNC: 15.8 GM/DL (ref 14–18)
IMM GRANULOCYTES # BLD AUTO: 0.01 X10(3)/MCL (ref 0–0.04)
IMM GRANULOCYTES NFR BLD AUTO: 0.2 %
LYMPHOCYTES # BLD AUTO: 1.69 X10(3)/MCL (ref 0.6–4.6)
LYMPHOCYTES NFR BLD AUTO: 28.2 %
MCH RBC QN AUTO: 30 PG (ref 27–31)
MCHC RBC AUTO-ENTMCNC: 34.1 MG/DL (ref 33–36)
MCV RBC AUTO: 88 FL (ref 80–94)
MONOCYTES # BLD AUTO: 0.68 X10(3)/MCL (ref 0.1–1.3)
MONOCYTES NFR BLD AUTO: 11.3 %
NEUTROPHILS # BLD AUTO: 3.4 X10(3)/MCL (ref 2.1–9.2)
NEUTROPHILS NFR BLD AUTO: 56.8 %
NRBC BLD AUTO-RTO: 0 %
PLATELET # BLD AUTO: 222 X10(3)/MCL (ref 130–400)
PMV BLD AUTO: 9.2 FL (ref 7.4–10.4)
POTASSIUM SERPL-SCNC: 4.2 MMOL/L (ref 3.5–5.1)
PROT SERPL-MCNC: 7.3 GM/DL (ref 6.4–8.3)
RBC # BLD AUTO: 5.26 X10(6)/MCL (ref 4.7–6.1)
SODIUM SERPL-SCNC: 139 MMOL/L (ref 136–145)
WBC # SPEC AUTO: 6 X10(3)/MCL (ref 4.5–11.5)

## 2022-11-08 PROCEDURE — 87522 HEPATITIS C REVRS TRNSCRPJ: CPT

## 2022-11-08 PROCEDURE — 36415 COLL VENOUS BLD VENIPUNCTURE: CPT

## 2022-11-08 PROCEDURE — 80053 COMPREHEN METABOLIC PANEL: CPT

## 2022-11-08 PROCEDURE — 85025 COMPLETE CBC W/AUTO DIFF WBC: CPT

## 2022-11-08 NOTE — TELEPHONE ENCOUNTER
Spoke with patient and let him know that when he comes in for  his 4wk f/u, he can have his Twinrix #2, so patient doesn't have to come back to clinic today.  Voiced understanding.

## 2022-11-08 NOTE — TELEPHONE ENCOUNTER
----- Message from Kailyn Mcclellan sent at 11/8/2022  7:33 AM CST -----  Regarding: Pt called  #DANIELLE PT#    Pt called he did his 4 week labs today, thinks he is suppose to get a shot today also?    Call back [phone]

## 2022-11-10 LAB — HCV RNA SERPL NAA+PROBE-ACNC: NORMAL IU/ML

## 2022-11-22 ENCOUNTER — OFFICE VISIT (OUTPATIENT)
Dept: INFECTIOUS DISEASES | Facility: CLINIC | Age: 32
End: 2022-11-22
Payer: MEDICAID

## 2022-11-22 DIAGNOSIS — Z23 NEED FOR VACCINATION: ICD-10-CM

## 2022-11-22 DIAGNOSIS — B18.2 CHRONIC HEPATITIS C WITHOUT HEPATIC COMA: Primary | ICD-10-CM

## 2022-11-22 PROCEDURE — 1160F PR REVIEW ALL MEDS BY PRESCRIBER/CLIN PHARMACIST DOCUMENTED: ICD-10-PCS | Mod: CPTII,95,, | Performed by: NURSE PRACTITIONER

## 2022-11-22 PROCEDURE — 1159F MED LIST DOCD IN RCRD: CPT | Mod: CPTII,95,, | Performed by: NURSE PRACTITIONER

## 2022-11-22 PROCEDURE — 1160F RVW MEDS BY RX/DR IN RCRD: CPT | Mod: CPTII,95,, | Performed by: NURSE PRACTITIONER

## 2022-11-22 PROCEDURE — 99213 OFFICE O/P EST LOW 20 MIN: CPT | Mod: 95,,, | Performed by: NURSE PRACTITIONER

## 2022-11-22 PROCEDURE — 1159F PR MEDICATION LIST DOCUMENTED IN MEDICAL RECORD: ICD-10-PCS | Mod: CPTII,95,, | Performed by: NURSE PRACTITIONER

## 2022-11-22 PROCEDURE — 99213 PR OFFICE/OUTPT VISIT, EST, LEVL III, 20-29 MIN: ICD-10-PCS | Mod: 95,,, | Performed by: NURSE PRACTITIONER

## 2022-11-22 NOTE — PROGRESS NOTES
Subjective:       Patient ID: Ryan Wild is a 32 y.o. male.    Chief Complaint: Follow-up (Hep C)    11/22/22  Ryan is a 33 yo WM evaluated via AV telemedicine as he was not able to complete registration online due to connectivity issue.  He started Epclusa on 10/11/22 as prescribed & is tolerating it well without any noted side effects. He states that he had a couple of bad dreams the first few days, but that has resolved & he feels better now compared to prior to treatment. Week 4 labs collected 11/8/22, HCV not detected. He will be due for 2nd dose of Twinrix vaccination with next lab collection scheduled on 12/6/22.  He has no concerns or questions today.      Patient and provider are located in the [facility].     Telephone time with patient:  27 minutes of total time spent on the encounter, which includes face to face time and non-face to face time preparing to see the patient (eg, review of tests), Obtaining and/or reviewing separately obtained history, Documenting clinical information in the electronic or other health record, Independently interpreting results (not separately reported) and communicating results to the patient/family/caregiver, or Care coordination (not separately reported).      Each patient to whom he or she provides medical services by telemedicine is:  (1) informed of the relationship between the physician and patient and the respective role of any other health care provider with respect to management of the patient; and (2) notified that he or she may decline to receive medical services by telemedicine and may withdraw from such care at any time.       10/10/22  Ryan is a 33 yo WM presenting today for initial HCV evaluation.  Initially diagnosed approximately 2015, referred 5/22 by PCP HAYLIE Dillon NP.  He is treatment naive.  History is significant for IVDU [id number].  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  He has several tattoos, some placed while in  [facility].  No history of blood transfusions.  His girlfriend has a history of HCV, treated & cured.  He endorses fatigue.  No jaundice, icterus, nausea, vomiting, dark urine, carlos colored stools.  Appreciates flu vax & starting Twinrix vaccination series today.  Will get FibroScan today. He is eager to start treatment asap.  Will have labs collected per protocol & appreciates telehealth visits to minimize time away from work.  All questions answered & concerns addressed.27    Review of Systems   Constitutional: Negative.    HENT: Negative.     Respiratory: Negative.     Cardiovascular: Negative.    Gastrointestinal: Negative.    Genitourinary: Negative.    Integumentary:  Negative.   Neurological: Negative.    Hematological: Negative.    Psychiatric/Behavioral: Negative.         Objective:      Physical Exam  Constitutional:       General: He is not in acute distress.  Pulmonary:      Effort: Pulmonary effort is normal.   Neurological:      Mental Status: He is alert and oriented to person, place, and time.   Psychiatric:         Mood and Affect: Mood normal.         Behavior: Behavior normal.         Thought Content: Thought content normal.         Judgment: Judgment normal.       Assessment:       Problem List Items Addressed This Visit          GI    Hepatitis C virus infection without hepatic coma - Primary (Chronic)     Other Visit Diagnoses       Need for vaccination        Relevant Orders    Hepatitis A / Hepatitis B Combined Vaccine (IM)              Plan:           Chronic hepatitis C without hepatic coma  DX 2015, treatment naive.  GT 3, baseline VL [id number]  FibroSure 9/14/22 A0, F0  FibroScan 10/10/22  RUQ abdominal u/s 6/6/22 WNL  Blood precautions: do not share a razor, needle, toothbrush, clippers with anyone.  Epclusa 1 po daily x 12 weeks, started 10/11/22.   Week 4 labs 11/8/22 HCV UD  Labs as scheduled.   RTC as scheduled.     Need for vaccination  -     Hepatitis A / Hepatitis B Combined Vaccine (IM);  Future; Expected date: 12/06/2022  Twinrix #2 same day as week 8 labs,   #3 due 4/10/23.

## 2022-12-06 ENCOUNTER — CLINICAL SUPPORT (OUTPATIENT)
Dept: INFECTIOUS DISEASES | Facility: CLINIC | Age: 32
End: 2022-12-06
Payer: MEDICAID

## 2022-12-06 DIAGNOSIS — B19.20 HEPATITIS C VIRUS INFECTION WITHOUT HEPATIC COMA, UNSPECIFIED CHRONICITY: ICD-10-CM

## 2022-12-06 DIAGNOSIS — Z23 NEED FOR VACCINATION: ICD-10-CM

## 2022-12-06 LAB
ALBUMIN SERPL-MCNC: 3.9 GM/DL (ref 3.5–5)
ALBUMIN/GLOB SERPL: 1.3 RATIO (ref 1.1–2)
ALP SERPL-CCNC: 47 UNIT/L (ref 40–150)
ALT SERPL-CCNC: 11 UNIT/L (ref 0–55)
AST SERPL-CCNC: 17 UNIT/L (ref 5–34)
BASOPHILS # BLD AUTO: 0.04 X10(3)/MCL (ref 0–0.2)
BASOPHILS NFR BLD AUTO: 0.7 %
BILIRUBIN DIRECT+TOT PNL SERPL-MCNC: 0.6 MG/DL
BUN SERPL-MCNC: 16.6 MG/DL (ref 8.9–20.6)
CALCIUM SERPL-MCNC: 9.1 MG/DL (ref 8.4–10.2)
CHLORIDE SERPL-SCNC: 108 MMOL/L (ref 98–107)
CO2 SERPL-SCNC: 24 MMOL/L (ref 22–29)
CREAT SERPL-MCNC: 0.94 MG/DL (ref 0.73–1.18)
EOSINOPHIL # BLD AUTO: 0.17 X10(3)/MCL (ref 0–0.9)
EOSINOPHIL NFR BLD AUTO: 2.8 %
ERYTHROCYTE [DISTWIDTH] IN BLOOD BY AUTOMATED COUNT: 11 % (ref 11.5–17)
GFR SERPLBLD CREATININE-BSD FMLA CKD-EPI: >60 MLS/MIN/1.73/M2
GLOBULIN SER-MCNC: 3 GM/DL (ref 2.4–3.5)
GLUCOSE SERPL-MCNC: 86 MG/DL (ref 74–100)
HCT VFR BLD AUTO: 42.4 % (ref 42–52)
HGB BLD-MCNC: 14.3 GM/DL (ref 14–18)
IMM GRANULOCYTES # BLD AUTO: 0.02 X10(3)/MCL (ref 0–0.04)
IMM GRANULOCYTES NFR BLD AUTO: 0.3 %
LYMPHOCYTES # BLD AUTO: 1.97 X10(3)/MCL (ref 0.6–4.6)
LYMPHOCYTES NFR BLD AUTO: 32 %
MCH RBC QN AUTO: 29.7 PG (ref 27–31)
MCHC RBC AUTO-ENTMCNC: 33.7 MG/DL (ref 33–36)
MCV RBC AUTO: 88.1 FL (ref 80–94)
MONOCYTES # BLD AUTO: 0.7 X10(3)/MCL (ref 0.1–1.3)
MONOCYTES NFR BLD AUTO: 11.4 %
NEUTROPHILS # BLD AUTO: 3.3 X10(3)/MCL (ref 2.1–9.2)
NEUTROPHILS NFR BLD AUTO: 52.8 %
NRBC BLD AUTO-RTO: 0 %
PLATELET # BLD AUTO: 256 X10(3)/MCL (ref 130–400)
PMV BLD AUTO: 9 FL (ref 7.4–10.4)
POTASSIUM SERPL-SCNC: 4.3 MMOL/L (ref 3.5–5.1)
PROT SERPL-MCNC: 6.9 GM/DL (ref 6.4–8.3)
RBC # BLD AUTO: 4.81 X10(6)/MCL (ref 4.7–6.1)
SODIUM SERPL-SCNC: 139 MMOL/L (ref 136–145)
WBC # SPEC AUTO: 6.2 X10(3)/MCL (ref 4.5–11.5)

## 2022-12-06 PROCEDURE — 80053 COMPREHEN METABOLIC PANEL: CPT

## 2022-12-06 PROCEDURE — 99211 OFF/OP EST MAY X REQ PHY/QHP: CPT | Mod: PBBFAC

## 2022-12-06 PROCEDURE — 36415 COLL VENOUS BLD VENIPUNCTURE: CPT

## 2022-12-06 PROCEDURE — 90636 HEP A/HEP B VACC ADULT IM: CPT | Mod: PBBFAC

## 2022-12-06 PROCEDURE — 87522 HEPATITIS C REVRS TRNSCRPJ: CPT

## 2022-12-06 PROCEDURE — 85025 COMPLETE CBC W/AUTO DIFF WBC: CPT

## 2022-12-06 NOTE — PROGRESS NOTES
Patient came in for scheduled Twinrix #2 vaccine injection. Twinrix given IM to left deltoid without difficulty. Patient tolerated well. Patient instructed to go to lab for lab draw. Patient voiced understanding.

## 2022-12-07 LAB — HCV RNA SERPL NAA+PROBE-ACNC: NORMAL IU/ML

## 2022-12-19 DIAGNOSIS — B19.20 HEPATITIS C VIRUS INFECTION WITHOUT HEPATIC COMA, UNSPECIFIED CHRONICITY: Primary | ICD-10-CM

## 2022-12-22 ENCOUNTER — TELEPHONE (OUTPATIENT)
Dept: INFECTIOUS DISEASES | Facility: CLINIC | Age: 32
End: 2022-12-22
Payer: MEDICAID

## 2022-12-22 NOTE — TELEPHONE ENCOUNTER
Spoke to pt, he is not able to stay logged on when driving to [place] for work. I did explain to him I'm not sure if Liane can do audio visits.   Per pt if Liane cant he will schedule an office appointment.  He is aware Liane is out of the office until Tuesday 27th.

## 2022-12-22 NOTE — TELEPHONE ENCOUNTER
----- Message from Kailyn Mcclellan sent at 12/22/2022  8:44 AM CST -----  Regarding: Virtual Appt  #DANIELLE#    Pt has a virtual appt scheduled 12/27, would like to know if he can do Audio only?  [phone]

## 2022-12-27 ENCOUNTER — OFFICE VISIT (OUTPATIENT)
Dept: INFECTIOUS DISEASES | Facility: CLINIC | Age: 32
End: 2022-12-27
Payer: MEDICAID

## 2022-12-27 DIAGNOSIS — B18.2 CHRONIC HEPATITIS C WITHOUT HEPATIC COMA: Primary | ICD-10-CM

## 2022-12-27 PROCEDURE — 1159F MED LIST DOCD IN RCRD: CPT | Mod: CPTII,95,, | Performed by: NURSE PRACTITIONER

## 2022-12-27 PROCEDURE — 1159F PR MEDICATION LIST DOCUMENTED IN MEDICAL RECORD: ICD-10-PCS | Mod: CPTII,95,, | Performed by: NURSE PRACTITIONER

## 2022-12-27 PROCEDURE — 1160F RVW MEDS BY RX/DR IN RCRD: CPT | Mod: CPTII,95,, | Performed by: NURSE PRACTITIONER

## 2022-12-27 PROCEDURE — 1160F PR REVIEW ALL MEDS BY PRESCRIBER/CLIN PHARMACIST DOCUMENTED: ICD-10-PCS | Mod: CPTII,95,, | Performed by: NURSE PRACTITIONER

## 2022-12-27 PROCEDURE — 99214 PR OFFICE/OUTPT VISIT, EST, LEVL IV, 30-39 MIN: ICD-10-PCS | Mod: 95,,, | Performed by: NURSE PRACTITIONER

## 2022-12-27 PROCEDURE — 99214 OFFICE O/P EST MOD 30 MIN: CPT | Mod: 95,,, | Performed by: NURSE PRACTITIONER

## 2022-12-27 RX ORDER — FLUTICASONE PROPIONATE 50 MCG
SPRAY, SUSPENSION (ML) NASAL
COMMUNITY
Start: 2022-11-25

## 2022-12-27 RX ORDER — ALBUTEROL SULFATE 90 UG/1
AEROSOL, METERED RESPIRATORY (INHALATION)
COMMUNITY
Start: 2022-11-25

## 2022-12-27 NOTE — PROGRESS NOTES
Subjective:       Patient ID: Ryan Wild is a 32 y.o. male.    Chief Complaint: Follow-up (Hep C 8 Week/Patient would like to know when he is due for his next Twinrix Vaccine)    12/27/22  Ryan is a 31 yo WM evaluated today via audio-only virtual visit.  He tells me that he does not have internet at home & has not been able to complete registration for AV visit.  Will convert next appt to in clinic visit, voiced understanding.  He has 5 doses of Epclusa left to complete 12 week treatment plan and has tolerated it well overall.  Hep C RNA not detected at weeks 4 & 8.  He received 2nd dose of Twinrix vaccine 12/6/22 as ordered, will be due for final dose around 4/10/23.  Voiced understanding.  No concerns today.     Patient and provider are located in the [facility].    Telephone time with patient:  30 minutes of total time spent on the encounter, which includes telephone time and non-face to face time preparing to see the patient (eg, review of tests), Obtaining and/or reviewing separately obtained history, Documenting clinical information in the electronic or other health record, Independently interpreting results (not separately reported) and communicating results to the patient/family/caregiver, or Care coordination (not separately reported).     Each patient to whom he or she provides medical services by telemedicine is:  (1) informed of the relationship between the physician and patient and the respective role of any other health care provider with respect to management of the patient; and (2) notified that he or she may decline to receive medical services by telemedicine and may withdraw from such care at any time.     11/22/22  Ryan is a 31 yo WM evaluated via AV telemedicine as he was not able to complete registration online due to connectivity issue.  He started Epclusa on 10/11/22 as prescribed & is tolerating it well without any noted side effects. He states that he had a couple of bad  dreams the first few days, but that has resolved & he feels better now compared to prior to treatment. Week 4 labs collected 11/8/22, HCV not detected. He will be due for 2nd dose of Twinrix vaccination with next lab collection scheduled on 12/6/22.  He has no concerns or questions today.      Patient and provider are located in the [facility].     Telephone time with patient:  27 minutes of total time spent on the encounter, which includes face to face time and non-face to face time preparing to see the patient (eg, review of tests), Obtaining and/or reviewing separately obtained history, Documenting clinical information in the electronic or other health record, Independently interpreting results (not separately reported) and communicating results to the patient/family/caregiver, or Care coordination (not separately reported).      Each patient to whom he or she provides medical services by telemedicine is:  (1) informed of the relationship between the physician and patient and the respective role of any other health care provider with respect to management of the patient; and (2) notified that he or she may decline to receive medical services by telemedicine and may withdraw from such care at any time.       10/10/22  Ryan is a 33 yo WM presenting today for initial HCV evaluation.  Initially diagnosed approximately 2015, referred 5/22 by PCP HAYLIE Dillon NP.  He is treatment naive.  History is significant for IVDU 7143-8150.  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  He has several tattoos, some placed while in [facility].  No history of blood transfusions.  His girlfriend has a history of HCV, treated & cured.  He endorses fatigue.  No jaundice, icterus, nausea, vomiting, dark urine, carlos colored stools.  Appreciates flu vax & starting Twinrix vaccination series today.  Will get FibroScan today. He is eager to start treatment asap.  Will have labs collected per protocol & appreciates  telehealth visits to minimize time away from work.  All questions answered & concerns addressed.    Review of Systems   Constitutional: Negative.    HENT: Negative.     Respiratory: Negative.     Cardiovascular: Negative.    Gastrointestinal: Negative.    Genitourinary: Negative.    Integumentary:  Negative.   Neurological: Negative.    Hematological: Negative.    Psychiatric/Behavioral: Negative.         Objective:      Physical Exam  Constitutional:       General: He is not in acute distress.     Appearance: Normal appearance.   HENT:      Head: Normocephalic.   Eyes:      Conjunctiva/sclera: Conjunctivae normal.   Pulmonary:      Effort: Pulmonary effort is normal.   Musculoskeletal:         General: Normal range of motion.      Cervical back: Normal range of motion.   Neurological:      General: No focal deficit present.      Mental Status: He is alert and oriented to person, place, and time. Mental status is at baseline.   Psychiatric:         Mood and Affect: Mood normal.         Behavior: Behavior normal.         Thought Content: Thought content normal.         Judgment: Judgment normal.       Assessment:       Problem List Items Addressed This Visit    None      Plan:           Chronic hepatitis C without hepatic coma    DX 2015, treatment naive.  GT 3, baseline VL [id number]  FibroSure 9/14/22 A0, F0  FibroScan 10/10/22  RUQ abdominal u/s 6/6/22 WNL  Blood precautions: do not share a razor, needle, toothbrush, clippers with anyone.  Epclusa 1 po daily x 12 weeks, started 10/11/22.   Week 4 labs 11/8/22 HCV UD  Week 8 labs 12/6/22 HCV UD  Labs as scheduled.   Revise next visit to in clinic appt due to issues with connectivity.    Twinrix #3 due 4/10/23.

## 2023-02-06 ENCOUNTER — TELEPHONE (OUTPATIENT)
Dept: INFECTIOUS DISEASES | Facility: CLINIC | Age: 33
End: 2023-02-06
Payer: MEDICAID

## 2023-02-06 NOTE — TELEPHONE ENCOUNTER
Patient called stating he his running fever (103), has cold sweats, and is coughing. He is on his way to urgent care to get COVID/flu tested. He cancelled his appt for this afternoon at 2:30pm. When would be a good day to overbook him?

## 2023-02-07 NOTE — TELEPHONE ENCOUNTER
Can you please check in to see if he went to an urgent care clinic for evaluation yesterday?  I don't see any visits in our medical record.  This will help me to figure out when & how I should proceed with rescheduling. Thank you.

## 2023-02-08 NOTE — TELEPHONE ENCOUNTER
He needs to complete end of treatment labs & we can try to do a telemedicine visit next week if he can get to a location with internet service for the appointment.    Otherwise it would be best to schedule appointment mid April for SVR 12 evaluation & final Twinrix vaccination dose.  Thank you.

## 2023-02-08 NOTE — TELEPHONE ENCOUNTER
Called and spoke with patient. He stated he did not go to an urgent care to get tested. He stated he had been taking nyquil/dayquil and is feeling better. He denies fever at this time. He also stated he went back to work today.     He did call the clinic yesterday to reschedule his appt and the PAR scheduled him for 03/22/2023. Would you like to see him sooner than that?

## 2023-02-10 NOTE — TELEPHONE ENCOUNTER
Attempted to contact patient to reschedule the appt. No answer. Voicemail left to call the clinic back.

## 2023-02-13 ENCOUNTER — OFFICE VISIT (OUTPATIENT)
Dept: INFECTIOUS DISEASES | Facility: CLINIC | Age: 33
End: 2023-02-13
Payer: MEDICAID

## 2023-02-13 VITALS
BODY MASS INDEX: 21.35 KG/M2 | HEIGHT: 67 IN | WEIGHT: 136 LBS | RESPIRATION RATE: 16 BRPM | TEMPERATURE: 98 F | SYSTOLIC BLOOD PRESSURE: 137 MMHG | HEART RATE: 65 BPM | DIASTOLIC BLOOD PRESSURE: 78 MMHG

## 2023-02-13 DIAGNOSIS — B18.2 CHRONIC HEPATITIS C WITHOUT HEPATIC COMA: Primary | ICD-10-CM

## 2023-02-13 LAB
ALBUMIN SERPL-MCNC: 3.9 G/DL (ref 3.5–5)
ALBUMIN/GLOB SERPL: 1.3 RATIO (ref 1.1–2)
ALP SERPL-CCNC: 51 UNIT/L (ref 40–150)
ALT SERPL-CCNC: 15 UNIT/L (ref 0–55)
AST SERPL-CCNC: 18 UNIT/L (ref 5–34)
BASOPHILS # BLD AUTO: 0.04 X10(3)/MCL (ref 0–0.2)
BASOPHILS NFR BLD AUTO: 0.6 %
BILIRUBIN DIRECT+TOT PNL SERPL-MCNC: 0.4 MG/DL
BUN SERPL-MCNC: 18.4 MG/DL (ref 8.9–20.6)
CALCIUM SERPL-MCNC: 9.2 MG/DL (ref 8.4–10.2)
CHLORIDE SERPL-SCNC: 106 MMOL/L (ref 98–107)
CO2 SERPL-SCNC: 26 MMOL/L (ref 22–29)
CREAT SERPL-MCNC: 0.79 MG/DL (ref 0.73–1.18)
EOSINOPHIL # BLD AUTO: 0.06 X10(3)/MCL (ref 0–0.9)
EOSINOPHIL NFR BLD AUTO: 0.9 %
ERYTHROCYTE [DISTWIDTH] IN BLOOD BY AUTOMATED COUNT: 11.5 % (ref 11.5–17)
GFR SERPLBLD CREATININE-BSD FMLA CKD-EPI: >60 MLS/MIN/1.73/M2
GLOBULIN SER-MCNC: 3 GM/DL (ref 2.4–3.5)
GLUCOSE SERPL-MCNC: 66 MG/DL (ref 74–100)
HCT VFR BLD AUTO: 42.7 % (ref 42–52)
HGB BLD-MCNC: 14.2 GM/DL (ref 14–18)
IMM GRANULOCYTES # BLD AUTO: 0.02 X10(3)/MCL (ref 0–0.04)
IMM GRANULOCYTES NFR BLD AUTO: 0.3 %
LYMPHOCYTES # BLD AUTO: 1.56 X10(3)/MCL (ref 0.6–4.6)
LYMPHOCYTES NFR BLD AUTO: 23.7 %
MCH RBC QN AUTO: 29.5 PG
MCHC RBC AUTO-ENTMCNC: 33.3 MG/DL (ref 33–36)
MCV RBC AUTO: 88.6 FL (ref 80–94)
MONOCYTES # BLD AUTO: 0.61 X10(3)/MCL (ref 0.1–1.3)
MONOCYTES NFR BLD AUTO: 9.3 %
NEUTROPHILS # BLD AUTO: 4.29 X10(3)/MCL (ref 2.1–9.2)
NEUTROPHILS NFR BLD AUTO: 65.2 %
NRBC BLD AUTO-RTO: 0 %
PLATELET # BLD AUTO: 302 X10(3)/MCL (ref 130–400)
PMV BLD AUTO: 9.2 FL (ref 7.4–10.4)
POTASSIUM SERPL-SCNC: 4.6 MMOL/L (ref 3.5–5.1)
PROT SERPL-MCNC: 6.9 GM/DL (ref 6.4–8.3)
RBC # BLD AUTO: 4.82 X10(6)/MCL (ref 4.7–6.1)
SODIUM SERPL-SCNC: 140 MMOL/L (ref 136–145)
WBC # SPEC AUTO: 6.6 X10(3)/MCL (ref 4.5–11.5)

## 2023-02-13 PROCEDURE — 85025 COMPLETE CBC W/AUTO DIFF WBC: CPT | Performed by: NURSE PRACTITIONER

## 2023-02-13 PROCEDURE — 99214 PR OFFICE/OUTPT VISIT, EST, LEVL IV, 30-39 MIN: ICD-10-PCS | Mod: S$PBB,,, | Performed by: NURSE PRACTITIONER

## 2023-02-13 PROCEDURE — 3078F DIAST BP <80 MM HG: CPT | Mod: CPTII,,, | Performed by: NURSE PRACTITIONER

## 2023-02-13 PROCEDURE — 3008F BODY MASS INDEX DOCD: CPT | Mod: CPTII,,, | Performed by: NURSE PRACTITIONER

## 2023-02-13 PROCEDURE — 3078F PR MOST RECENT DIASTOLIC BLOOD PRESSURE < 80 MM HG: ICD-10-PCS | Mod: CPTII,,, | Performed by: NURSE PRACTITIONER

## 2023-02-13 PROCEDURE — 3008F PR BODY MASS INDEX (BMI) DOCUMENTED: ICD-10-PCS | Mod: CPTII,,, | Performed by: NURSE PRACTITIONER

## 2023-02-13 PROCEDURE — 99214 OFFICE O/P EST MOD 30 MIN: CPT | Mod: S$PBB,,, | Performed by: NURSE PRACTITIONER

## 2023-02-13 PROCEDURE — 3075F PR MOST RECENT SYSTOLIC BLOOD PRESS GE 130-139MM HG: ICD-10-PCS | Mod: CPTII,,, | Performed by: NURSE PRACTITIONER

## 2023-02-13 PROCEDURE — 1159F PR MEDICATION LIST DOCUMENTED IN MEDICAL RECORD: ICD-10-PCS | Mod: CPTII,,, | Performed by: NURSE PRACTITIONER

## 2023-02-13 PROCEDURE — 1160F RVW MEDS BY RX/DR IN RCRD: CPT | Mod: CPTII,,, | Performed by: NURSE PRACTITIONER

## 2023-02-13 PROCEDURE — 36415 COLL VENOUS BLD VENIPUNCTURE: CPT | Performed by: NURSE PRACTITIONER

## 2023-02-13 PROCEDURE — 99213 OFFICE O/P EST LOW 20 MIN: CPT | Mod: PBBFAC | Performed by: NURSE PRACTITIONER

## 2023-02-13 PROCEDURE — 1159F MED LIST DOCD IN RCRD: CPT | Mod: CPTII,,, | Performed by: NURSE PRACTITIONER

## 2023-02-13 PROCEDURE — 1160F PR REVIEW ALL MEDS BY PRESCRIBER/CLIN PHARMACIST DOCUMENTED: ICD-10-PCS | Mod: CPTII,,, | Performed by: NURSE PRACTITIONER

## 2023-02-13 PROCEDURE — 3075F SYST BP GE 130 - 139MM HG: CPT | Mod: CPTII,,, | Performed by: NURSE PRACTITIONER

## 2023-02-13 PROCEDURE — 80053 COMPREHEN METABOLIC PANEL: CPT | Performed by: NURSE PRACTITIONER

## 2023-02-13 PROCEDURE — 87522 HEPATITIS C REVRS TRNSCRPJ: CPT | Performed by: NURSE PRACTITIONER

## 2023-02-13 RX ORDER — VALACYCLOVIR HYDROCHLORIDE 1 G/1
1000 TABLET, FILM COATED ORAL 3 TIMES DAILY
COMMUNITY
Start: 2023-02-08

## 2023-02-13 NOTE — PROGRESS NOTES
Subjective:       Patient ID: Ryan Wild is a 32 y.o. male.    Chief Complaint: Followup Hep C     2/13/23  Ryan is a 31 yo WM here today for HCV f/u visit.  He completed 12 weeks of Epclusa on 1/3/23 as scheduled.  He tolerated it well overall.  HCV was undetected at weeks 4 & 8 of treatment.  Will collect for end of treatment labs today.  He tells me that he did end of testing positive for COVID last week, masked in office.  He will be due for final dose of Twinrix vaccination series next visit.  He appreciates doing labs same day as visit with an early morning appointment.  He has changed out toothbrush, razor, and clippers.  He remains in sober living and is doing well with staying clean as well.      11/22/22  Ryan is a 31 yo WM evaluated via AV telemedicine as he was not able to complete registration online due to connectivity issue.  He started Epclusa on 10/11/22 as prescribed & is tolerating it well without any noted side effects. He states that he had a couple of bad dreams the first few days, but that has resolved & he feels better now compared to prior to treatment. Week 4 labs collected 11/8/22, HCV not detected. He will be due for 2nd dose of Twinrix vaccination with next lab collection scheduled on 12/6/22.  He has no concerns or questions today.      10/10/22  Ryan is a 31 yo WM presenting today for initial HCV evaluation.  Initially diagnosed approximately 2015, referred 5/22 by PCP HAYLIE Dillon NP.  He is treatment naive.  History is significant for IVDU 9925-3599.  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  He has several tattoos, some [facility].  No history of blood transfusions.  His girlfriend has a history of HCV, treated & cured.  He endorses fatigue.  No jaundice, icterus, nausea, vomiting, dark urine, carlos colored stools.  Appreciates flu vax & starting Twinrix vaccination series today.  Will get FibroScan today. He is eager to start treatment asap.   Will have labs collected per protocol & appreciates telehealth visits to minimize time away from work.  All questions answered & concerns addressed.    Review of Systems   Constitutional: Negative.    HENT: Negative.     Respiratory: Negative.     Cardiovascular: Negative.    Gastrointestinal: Negative.    Genitourinary: Negative.    Integumentary:  Negative.   Neurological: Negative.    Hematological: Negative.    Psychiatric/Behavioral: Negative.         Objective:      Physical Exam  Vitals reviewed.   Constitutional:       General: He is not in acute distress.     Appearance: Normal appearance. He is not toxic-appearing.   Eyes:      General: No scleral icterus.  Cardiovascular:      Rate and Rhythm: Normal rate and regular rhythm.      Heart sounds: Normal heart sounds.   Pulmonary:      Effort: Pulmonary effort is normal. No respiratory distress.      Breath sounds: Normal breath sounds.   Abdominal:      General: Bowel sounds are normal. There is no distension.      Palpations: Abdomen is soft. There is no mass.      Tenderness: There is no abdominal tenderness.   Musculoskeletal:         General: Normal range of motion.   Skin:     General: Skin is warm and dry.   Neurological:      Mental Status: He is alert and oriented to person, place, and time.       Assessment:       Problem List Items Addressed This Visit    None      Plan:           Chronic hepatitis C without hepatic coma  -     Hepatitis C Virus Quantitative; Future; Expected date: 02/13/2023  -     Comprehensive Metabolic Panel  -     CBC Auto Differential; Future; Expected date: 02/13/2023     DX 2015, treatment naive.  GT 3, baseline VL [id number]  FibroSure 9/14/22 A0, F0  FibroScan 10/10/22  RUQ abdominal u/s 6/6/22 WNL  Blood precautions: do not share a razor, needle, toothbrush, clippers with anyone.  Epclusa 1 po daily x 12 weeks, 10/11/22-1/3/23.   Week 4 labs 11/8/22 HCV UD  Week 8 labs 12/6/22 HCV UD  Labs today.  Twinrix #3 due 4/10/23  RTC  2 months with Liane.

## 2023-02-15 LAB — HCV RNA SERPL NAA+PROBE-ACNC: NORMAL IU/ML

## 2023-02-19 NOTE — PROCEDURES
Fibroscan Procedure     Name: Ryan Wild  Date of Procedure : 10/10/2022  Interpreting Physician: Leela Pete MD, MPH  Diagnosis: HCV    Probe: M    Fibroscan readin.8 kPa    Fibrosis: F 0-1     CAP readin dB/m    Steatosis: S0      Miscellaneous:

## 2023-04-13 DIAGNOSIS — B18.2 CHRONIC HEPATITIS C WITHOUT HEPATIC COMA: Primary | ICD-10-CM

## 2023-05-12 ENCOUNTER — TELEPHONE (OUTPATIENT)
Dept: INFECTIOUS DISEASES | Facility: CLINIC | Age: 33
End: 2023-05-12
Payer: MEDICAID

## 2023-06-02 ENCOUNTER — HOSPITAL ENCOUNTER (INPATIENT)
Facility: HOSPITAL | Age: 33
LOS: 14 days | Discharge: HOME | DRG: 917 | End: 2023-06-16
Attending: INTERNAL MEDICINE | Admitting: STUDENT IN AN ORGANIZED HEALTH CARE EDUCATION/TRAINING PROGRAM
Payer: MEDICAID

## 2023-06-02 DIAGNOSIS — N17.9 ACUTE RENAL FAILURE, UNSPECIFIED ACUTE RENAL FAILURE TYPE: ICD-10-CM

## 2023-06-02 DIAGNOSIS — R65.21 SEPTIC SHOCK: ICD-10-CM

## 2023-06-02 DIAGNOSIS — R07.9 CHEST PAIN: ICD-10-CM

## 2023-06-02 DIAGNOSIS — E87.5 HYPERKALEMIA: ICD-10-CM

## 2023-06-02 DIAGNOSIS — A41.9 SEPTIC SHOCK: ICD-10-CM

## 2023-06-02 DIAGNOSIS — J18.9 COMMUNITY ACQUIRED PNEUMONIA OF RIGHT LUNG, UNSPECIFIED PART OF LUNG: ICD-10-CM

## 2023-06-02 DIAGNOSIS — M62.82 NON-TRAUMATIC RHABDOMYOLYSIS: Primary | ICD-10-CM

## 2023-06-02 DIAGNOSIS — M79.606 LEG PAIN: ICD-10-CM

## 2023-06-02 DIAGNOSIS — N18.6 ESRD (END STAGE RENAL DISEASE): ICD-10-CM

## 2023-06-02 DIAGNOSIS — R09.02 HYPOXIA: ICD-10-CM

## 2023-06-02 DIAGNOSIS — I95.9 HYPOTENSION: ICD-10-CM

## 2023-06-02 DIAGNOSIS — R79.89 TROPONIN I ABOVE REFERENCE RANGE: ICD-10-CM

## 2023-06-02 DIAGNOSIS — A41.9 SEPSIS, DUE TO UNSPECIFIED ORGANISM, UNSPECIFIED WHETHER ACUTE ORGAN DYSFUNCTION PRESENT: ICD-10-CM

## 2023-06-02 DIAGNOSIS — N17.9 AKI (ACUTE KIDNEY INJURY): ICD-10-CM

## 2023-06-02 DIAGNOSIS — I21.4 NSTEMI (NON-ST ELEVATED MYOCARDIAL INFARCTION): ICD-10-CM

## 2023-06-02 DIAGNOSIS — R53.1 WEAKNESS: ICD-10-CM

## 2023-06-02 DIAGNOSIS — E87.20 METABOLIC ACIDOSIS: ICD-10-CM

## 2023-06-02 DIAGNOSIS — A41.9 SEPSIS: ICD-10-CM

## 2023-06-02 DIAGNOSIS — R57.9 SHOCK: ICD-10-CM

## 2023-06-02 PROBLEM — E83.41 HYPERMAGNESEMIA: Status: ACTIVE | Noted: 2023-06-02

## 2023-06-02 PROBLEM — Q60.0 SOLITARY KIDNEY, CONGENITAL: Status: ACTIVE | Noted: 2023-06-02

## 2023-06-02 PROBLEM — R74.02 ELEVATED LEVELS OF TRANSAMINASE & LACTIC ACID DEHYDROGENASE: Status: ACTIVE | Noted: 2023-06-02

## 2023-06-02 PROBLEM — R74.01 ELEVATED LEVELS OF TRANSAMINASE & LACTIC ACID DEHYDROGENASE: Status: ACTIVE | Noted: 2023-06-02

## 2023-06-02 PROBLEM — G40.909 SEIZURE DISORDER: Status: ACTIVE | Noted: 2022-08-13

## 2023-06-02 LAB
A-ADO2 BLOOD GAS (OHS): 551 MMHG
ABS NEUT CALC (OHS): 37.72 X10(3)/MCL (ref 2.1–9.2)
ALBUMIN SERPL-MCNC: 3.1 G/DL (ref 3.5–5)
ALBUMIN SERPL-MCNC: 3.9 G/DL (ref 3.5–5)
ALBUMIN/GLOB SERPL: 1.1 RATIO (ref 1.1–2)
ALBUMIN/GLOB SERPL: 1.2 RATIO (ref 1.1–2)
ALLENS TEST BLOOD GAS (OHS): YES
ALP SERPL-CCNC: 135 UNIT/L (ref 40–150)
ALP SERPL-CCNC: 55 UNIT/L (ref 40–150)
ALT SERPL-CCNC: 520 UNIT/L (ref 0–55)
ALT SERPL-CCNC: 558 UNIT/L (ref 0–55)
AMORPH URATE CRY URNS QL MICRO: ABNORMAL /UL
AMPHET UR QL SCN: POSITIVE
ANION GAP SERPL CALC-SCNC: 10 MEQ/L
ANISOCYTOSIS BLD QL SMEAR: ABNORMAL
APPEARANCE UR: ABNORMAL
APTT PPP: 27.1 SECONDS
AST SERPL-CCNC: 789 UNIT/L (ref 5–34)
AST SERPL-CCNC: 867 UNIT/L (ref 5–34)
BACTERIA #/AREA URNS AUTO: ABNORMAL /HPF
BARBITURATE SCN PRESENT UR: NEGATIVE
BASE EXCESS BLD CALC-SCNC: -13.7 MMOL/L (ref -2–2)
BASOPHILS # BLD AUTO: 0.17 X10(3)/MCL
BASOPHILS NFR BLD AUTO: 0.4 %
BENZODIAZ UR QL SCN: NEGATIVE
BILIRUB UR QL STRIP.AUTO: NEGATIVE MG/DL
BILIRUBIN DIRECT+TOT PNL SERPL-MCNC: 0.2 MG/DL
BILIRUBIN DIRECT+TOT PNL SERPL-MCNC: 0.3 MG/DL
BIPAP(E) BLOOD GAS (OHS): 5 CM H2O
BIPAP(I) BLOOD GAS (OHS): 10 CM H2O
BLOOD GAS SAMPLE TYPE (OHS): ABNORMAL
BUN SERPL-MCNC: 33.1 MG/DL (ref 8.9–20.6)
BUN SERPL-MCNC: 36 MG/DL (ref 8.9–20.6)
BUN SERPL-MCNC: 37.2 MG/DL (ref 8.9–20.6)
CALCIUM SERPL-MCNC: 6.3 MG/DL (ref 8.4–10.2)
CALCIUM SERPL-MCNC: 6.3 MG/DL (ref 8.4–10.2)
CALCIUM SERPL-MCNC: 7.5 MG/DL (ref 8.4–10.2)
CANNABINOIDS UR QL SCN: NEGATIVE
CHLORIDE SERPL-SCNC: 102 MMOL/L (ref 98–107)
CHLORIDE SERPL-SCNC: 104 MMOL/L (ref 98–107)
CHLORIDE SERPL-SCNC: 104 MMOL/L (ref 98–107)
CHLORIDE UR-SCNC: 52.6 MMOL/L
CK SERPL-CCNC: ABNORMAL U/L (ref 30–200)
CO2 BLDA-SCNC: 18.1 MMOL/L (ref 22–26)
CO2 SERPL-SCNC: 14 MMOL/L (ref 22–29)
CO2 SERPL-SCNC: 16 MMOL/L (ref 22–29)
CO2 SERPL-SCNC: 18 MMOL/L (ref 22–29)
COCAINE UR QL SCN: NEGATIVE
COHGB MFR BLDA: 1.2 % (ref 0.5–1.5)
COLOR UR: YELLOW
CREAT SERPL-MCNC: 3.11 MG/DL (ref 0.73–1.18)
CREAT SERPL-MCNC: 3.23 MG/DL (ref 0.73–1.18)
CREAT SERPL-MCNC: 3.77 MG/DL (ref 0.73–1.18)
CREAT UR-MCNC: 80.6 MG/DL (ref 63–166)
CREAT/UREA NIT SERPL: 12
D DIMER PPP IA.FEU-MCNC: 3.51 UG/ML FEU (ref 0–0.5)
DRAWN BY BLOOD GAS (OHS): ABNORMAL
EOSINOPHIL # BLD AUTO: 0.01 X10(3)/MCL (ref 0–0.9)
EOSINOPHIL NFR BLD AUTO: 0 %
ERYTHROCYTE [DISTWIDTH] IN BLOOD BY AUTOMATED COUNT: 12.6 % (ref 11.5–17)
ERYTHROCYTE [DISTWIDTH] IN BLOOD BY AUTOMATED COUNT: 12.6 % (ref 11.5–17)
ETHANOL SERPL-MCNC: <10 MG/DL
FENTANYL UR QL SCN: POSITIVE
FIO2 BLOOD GAS (OHS): 100 %
FLUAV AG UPPER RESP QL IA.RAPID: NOT DETECTED
FLUBV AG UPPER RESP QL IA.RAPID: NOT DETECTED
GFR SERPLBLD CREATININE-BSD FMLA CKD-EPI: 21 MLS/MIN/1.73/M2
GFR SERPLBLD CREATININE-BSD FMLA CKD-EPI: 25 MLS/MIN/1.73/M2
GFR SERPLBLD CREATININE-BSD FMLA CKD-EPI: 26 MLS/MIN/1.73/M2
GLOBULIN SER-MCNC: 2.6 GM/DL (ref 2.4–3.5)
GLOBULIN SER-MCNC: 3.5 GM/DL (ref 2.4–3.5)
GLUCOSE SERPL-MCNC: 105 MG/DL (ref 74–100)
GLUCOSE SERPL-MCNC: 151 MG/DL (ref 74–100)
GLUCOSE SERPL-MCNC: 95 MG/DL (ref 74–100)
GLUCOSE UR QL STRIP.AUTO: NORMAL MG/DL
HBV SURFACE AG SERPL QL IA: NONREACTIVE
HCO3 BLDA-SCNC: 16.4 MMOL/L (ref 22–26)
HCT VFR BLD AUTO: 46 % (ref 42–52)
HCT VFR BLD AUTO: 50.1 % (ref 42–52)
HGB BLD-MCNC: 14.6 G/DL (ref 14–18)
HGB BLD-MCNC: 15.1 G/DL (ref 14–18)
HYALINE CASTS #/AREA URNS LPF: ABNORMAL /LPF
IMM GRANULOCYTES # BLD AUTO: 1.01 X10(3)/MCL (ref 0–0.04)
IMM GRANULOCYTES NFR BLD AUTO: 2.4 %
KETONES UR QL STRIP.AUTO: ABNORMAL MG/DL
LACTATE SERPL-SCNC: 4.8 MMOL/L (ref 0.5–2.2)
LACTATE SERPL-SCNC: 8 MMOL/L (ref 0.5–2.2)
LDH SERPL-CCNC: 2294 U/L (ref 125–220)
LEUKOCYTE ESTERASE UR QL STRIP.AUTO: NEGATIVE UNIT/L
LYMPHOCYTES # BLD AUTO: 1 X10(3)/MCL (ref 0.6–4.6)
LYMPHOCYTES NFR BLD AUTO: 2.4 %
LYMPHOCYTES NFR BLD MANUAL: 1.66 X10(3)/MCL
LYMPHOCYTES NFR BLD MANUAL: 4 % (ref 13–40)
MAGNESIUM SERPL-MCNC: 2.9 MG/DL (ref 1.6–2.6)
MCH RBC QN AUTO: 29.5 PG (ref 27–31)
MCH RBC QN AUTO: 29.7 PG (ref 27–31)
MCHC RBC AUTO-ENTMCNC: 30.1 G/DL (ref 33–36)
MCHC RBC AUTO-ENTMCNC: 31.7 G/DL (ref 33–36)
MCV RBC AUTO: 92.9 FL (ref 80–94)
MCV RBC AUTO: 98.6 FL (ref 80–94)
MDMA UR QL SCN: NEGATIVE
METHGB MFR BLDA: 0.8 % (ref 0–1.5)
MODE (OHS): ABNORMAL
MONOCYTES # BLD AUTO: 1.46 X10(3)/MCL (ref 0.1–1.3)
MONOCYTES NFR BLD AUTO: 3.5 %
MONOCYTES NFR BLD MANUAL: 2.07 X10(3)/MCL (ref 0.1–1.3)
MONOCYTES NFR BLD MANUAL: 5 % (ref 2–11)
NEUTROPHILS # BLD AUTO: 38.45 X10(3)/MCL (ref 2.1–9.2)
NEUTROPHILS NFR BLD AUTO: 91.3 %
NEUTROPHILS NFR BLD MANUAL: 91 % (ref 47–80)
NITRITE UR QL STRIP.AUTO: NEGATIVE
NRBC BLD AUTO-RTO: 0 %
NRBC BLD AUTO-RTO: 0.1 %
O2 HB BLOOD GAS (OHS): 95.8 % (ref 94–100)
OPIATES UR QL SCN: NEGATIVE
OXYHGB MFR BLDA: 14.1 G/DL (ref 12–18)
PAO2 BLOOD GAS (OHS): 646 MMHG
PCO2 BLDA: 54 MMHG (ref 35–45)
PCP UR QL: NEGATIVE
PH BLDA: 7.09 [PH] (ref 7.35–7.45)
PH UR STRIP.AUTO: 5.5 [PH]
PH UR: 5.5 [PH] (ref 3–11)
PHOSPHATE SERPL-MCNC: 17.3 MG/DL (ref 2.3–4.7)
PLATELET # BLD AUTO: 405 X10(3)/MCL (ref 130–400)
PLATELET # BLD AUTO: 485 X10(3)/MCL (ref 130–400)
PLATELET # BLD EST: ADEQUATE 10*3/UL
PMV BLD AUTO: 9 FL (ref 7.4–10.4)
PMV BLD AUTO: 9.1 FL (ref 7.4–10.4)
PO2 BLDA: 95 MMHG (ref 75–100)
POCT GLUCOSE: 105 MG/DL (ref 70–110)
POCT GLUCOSE: 115 MG/DL (ref 70–110)
POCT GLUCOSE: 117 MG/DL (ref 70–110)
POCT GLUCOSE: 128 MG/DL (ref 70–110)
POCT GLUCOSE: 52 MG/DL (ref 70–110)
POCT GLUCOSE: 60 MG/DL (ref 70–110)
POTASSIUM SERPL-SCNC: 6.3 MMOL/L (ref 3.5–5.1)
POTASSIUM SERPL-SCNC: 6.9 MMOL/L (ref 3.5–5.1)
POTASSIUM SERPL-SCNC: 6.9 MMOL/L (ref 3.5–5.1)
PROT SERPL-MCNC: 5.7 GM/DL (ref 6.4–8.3)
PROT SERPL-MCNC: 7.4 GM/DL (ref 6.4–8.3)
PROT UR QL STRIP.AUTO: ABNORMAL MG/DL
PROT UR STRIP-MCNC: 141.1 MG/DL
RBC # BLD AUTO: 4.95 X10(6)/MCL (ref 4.7–6.1)
RBC # BLD AUTO: 5.08 X10(6)/MCL (ref 4.7–6.1)
RBC #/AREA URNS AUTO: ABNORMAL /HPF
RBC MORPH BLD: ABNORMAL
RBC UR QL AUTO: ABNORMAL UNIT/L
SALICYLATES SERPL-MCNC: <5 MG/DL
SAMPLE SITE BLOOD GAS (OHS): ABNORMAL
SAO2 % BLDA: 97.7 %
SARS-COV-2 RNA RESP QL NAA+PROBE: NOT DETECTED
SODIUM SERPL-SCNC: 132 MMOL/L (ref 136–145)
SODIUM SERPL-SCNC: 133 MMOL/L (ref 136–145)
SODIUM SERPL-SCNC: 138 MMOL/L (ref 136–145)
SODIUM UR-SCNC: 79.2 MMOL/L
SP GR UR STRIP.AUTO: 1.02
SQUAMOUS #/AREA URNS LPF: ABNORMAL /HPF
TROPONIN I SERPL-MCNC: 1.88 NG/ML (ref 0–0.04)
TROPONIN I SERPL-MCNC: 3.67 NG/ML (ref 0–0.04)
UNIDENT CRYS #/AREA URNS HPF: ABNORMAL /HPF
URATE SERPL-MCNC: 10.8 MG/DL (ref 3.5–7.2)
URINE PROTEIN/CREATININE RATIO (OHS): 1.8
UROBILINOGEN UR STRIP-ACNC: NORMAL MG/DL
UUN UR-MCNC: 208 MG/DL
WBC # SPEC AUTO: 41.45 X10(3)/MCL (ref 4.5–11.5)
WBC # SPEC AUTO: 42.1 X10(3)/MCL (ref 4.5–11.5)
WBC #/AREA URNS AUTO: ABNORMAL /HPF
WBC CLUMPS UR QL AUTO: ABNORMAL /HPF

## 2023-06-02 PROCEDURE — 87340 HEPATITIS B SURFACE AG IA: CPT

## 2023-06-02 PROCEDURE — 84550 ASSAY OF BLOOD/URIC ACID: CPT | Performed by: INTERNAL MEDICINE

## 2023-06-02 PROCEDURE — 82803 BLOOD GASES ANY COMBINATION: CPT

## 2023-06-02 PROCEDURE — 25000003 PHARM REV CODE 250: Performed by: INTERNAL MEDICINE

## 2023-06-02 PROCEDURE — 83605 ASSAY OF LACTIC ACID: CPT | Performed by: INTERNAL MEDICINE

## 2023-06-02 PROCEDURE — 84300 ASSAY OF URINE SODIUM: CPT | Performed by: STUDENT IN AN ORGANIZED HEALTH CARE EDUCATION/TRAINING PROGRAM

## 2023-06-02 PROCEDURE — 96368 THER/DIAG CONCURRENT INF: CPT | Mod: 59

## 2023-06-02 PROCEDURE — 85027 COMPLETE CBC AUTOMATED: CPT

## 2023-06-02 PROCEDURE — 84484 ASSAY OF TROPONIN QUANT: CPT

## 2023-06-02 PROCEDURE — 80179 DRUG ASSAY SALICYLATE: CPT | Performed by: INTERNAL MEDICINE

## 2023-06-02 PROCEDURE — 94660 CPAP INITIATION&MGMT: CPT

## 2023-06-02 PROCEDURE — 82962 GLUCOSE BLOOD TEST: CPT

## 2023-06-02 PROCEDURE — 80307 DRUG TEST PRSMV CHEM ANLYZR: CPT | Performed by: STUDENT IN AN ORGANIZED HEALTH CARE EDUCATION/TRAINING PROGRAM

## 2023-06-02 PROCEDURE — 63600175 PHARM REV CODE 636 W HCPCS: Performed by: STUDENT IN AN ORGANIZED HEALTH CARE EDUCATION/TRAINING PROGRAM

## 2023-06-02 PROCEDURE — 83615 LACTATE (LD) (LDH) ENZYME: CPT | Performed by: INTERNAL MEDICINE

## 2023-06-02 PROCEDURE — 85025 COMPLETE CBC W/AUTO DIFF WBC: CPT | Performed by: INTERNAL MEDICINE

## 2023-06-02 PROCEDURE — 99900035 HC TECH TIME PER 15 MIN (STAT)

## 2023-06-02 PROCEDURE — 83735 ASSAY OF MAGNESIUM: CPT | Performed by: INTERNAL MEDICINE

## 2023-06-02 PROCEDURE — 25000003 PHARM REV CODE 250: Performed by: STUDENT IN AN ORGANIZED HEALTH CARE EDUCATION/TRAINING PROGRAM

## 2023-06-02 PROCEDURE — 85730 THROMBOPLASTIN TIME PARTIAL: CPT | Performed by: INTERNAL MEDICINE

## 2023-06-02 PROCEDURE — 80100014 HC HEMODIALYSIS 1:1

## 2023-06-02 PROCEDURE — 82570 ASSAY OF URINE CREATININE: CPT | Performed by: FAMILY MEDICINE

## 2023-06-02 PROCEDURE — 81001 URINALYSIS AUTO W/SCOPE: CPT | Performed by: INTERNAL MEDICINE

## 2023-06-02 PROCEDURE — 80177 DRUG SCRN QUAN LEVETIRACETAM: CPT

## 2023-06-02 PROCEDURE — 84484 ASSAY OF TROPONIN QUANT: CPT | Performed by: INTERNAL MEDICINE

## 2023-06-02 PROCEDURE — 85610 PROTHROMBIN TIME: CPT | Performed by: INTERNAL MEDICINE

## 2023-06-02 PROCEDURE — 85379 FIBRIN DEGRADATION QUANT: CPT | Performed by: INTERNAL MEDICINE

## 2023-06-02 PROCEDURE — 87088 URINE BACTERIA CULTURE: CPT | Performed by: INTERNAL MEDICINE

## 2023-06-02 PROCEDURE — 96366 THER/PROPH/DIAG IV INF ADDON: CPT

## 2023-06-02 PROCEDURE — 93005 ELECTROCARDIOGRAM TRACING: CPT

## 2023-06-02 PROCEDURE — 99291 CRITICAL CARE FIRST HOUR: CPT

## 2023-06-02 PROCEDURE — 27000190 HC CPAP FULL FACE MASK W/VALVE

## 2023-06-02 PROCEDURE — 25000003 PHARM REV CODE 250

## 2023-06-02 PROCEDURE — 25000003 PHARM REV CODE 250: Performed by: FAMILY MEDICINE

## 2023-06-02 PROCEDURE — 82533 TOTAL CORTISOL: CPT

## 2023-06-02 PROCEDURE — 96365 THER/PROPH/DIAG IV INF INIT: CPT

## 2023-06-02 PROCEDURE — 84100 ASSAY OF PHOSPHORUS: CPT | Performed by: INTERNAL MEDICINE

## 2023-06-02 PROCEDURE — 0240U COVID/FLU A&B PCR: CPT | Performed by: INTERNAL MEDICINE

## 2023-06-02 PROCEDURE — 82436 ASSAY OF URINE CHLORIDE: CPT | Performed by: FAMILY MEDICINE

## 2023-06-02 PROCEDURE — 63600175 PHARM REV CODE 636 W HCPCS: Performed by: FAMILY MEDICINE

## 2023-06-02 PROCEDURE — 87040 BLOOD CULTURE FOR BACTERIA: CPT | Performed by: INTERNAL MEDICINE

## 2023-06-02 PROCEDURE — 27000923 HC TRIALYSIS CATHETER, ANY SIZE

## 2023-06-02 PROCEDURE — 63600175 PHARM REV CODE 636 W HCPCS: Performed by: INTERNAL MEDICINE

## 2023-06-02 PROCEDURE — 82550 ASSAY OF CK (CPK): CPT | Performed by: INTERNAL MEDICINE

## 2023-06-02 PROCEDURE — 84520 ASSAY OF UREA NITROGEN: CPT | Performed by: FAMILY MEDICINE

## 2023-06-02 PROCEDURE — 80053 COMPREHEN METABOLIC PANEL: CPT

## 2023-06-02 PROCEDURE — 63600175 PHARM REV CODE 636 W HCPCS

## 2023-06-02 PROCEDURE — 82077 ASSAY SPEC XCP UR&BREATH IA: CPT | Performed by: INTERNAL MEDICINE

## 2023-06-02 PROCEDURE — 20000000 HC ICU ROOM

## 2023-06-02 PROCEDURE — 36600 WITHDRAWAL OF ARTERIAL BLOOD: CPT

## 2023-06-02 PROCEDURE — 80053 COMPREHEN METABOLIC PANEL: CPT | Performed by: INTERNAL MEDICINE

## 2023-06-02 RX ORDER — INSULIN ASPART 100 [IU]/ML
6 INJECTION, SOLUTION INTRAVENOUS; SUBCUTANEOUS ONCE
Status: DISCONTINUED | OUTPATIENT
Start: 2023-06-02 | End: 2023-06-02

## 2023-06-02 RX ORDER — HEPARIN SODIUM,PORCINE/D5W 25000/250
0-40 INTRAVENOUS SOLUTION INTRAVENOUS CONTINUOUS
Status: DISCONTINUED | OUTPATIENT
Start: 2023-06-02 | End: 2023-06-07

## 2023-06-02 RX ORDER — LEVETIRACETAM 500 MG/1
500 TABLET ORAL 2 TIMES DAILY
Status: DISCONTINUED | OUTPATIENT
Start: 2023-06-02 | End: 2023-06-05

## 2023-06-02 RX ORDER — HEPARIN SODIUM 5000 [USP'U]/ML
5000 INJECTION, SOLUTION INTRAVENOUS; SUBCUTANEOUS EVERY 8 HOURS
Status: DISCONTINUED | OUTPATIENT
Start: 2023-06-02 | End: 2023-06-02

## 2023-06-02 RX ORDER — NOREPINEPHRINE BITARTRATE/D5W 4MG/250ML
0-3 PLASTIC BAG, INJECTION (ML) INTRAVENOUS CONTINUOUS
Status: DISCONTINUED | OUTPATIENT
Start: 2023-06-02 | End: 2023-06-08

## 2023-06-02 RX ORDER — INSULIN ASPART 100 [IU]/ML
6 INJECTION, SOLUTION INTRAVENOUS; SUBCUTANEOUS
Status: DISCONTINUED | OUTPATIENT
Start: 2023-06-02 | End: 2023-06-02

## 2023-06-02 RX ORDER — SODIUM CHLORIDE 0.9 % (FLUSH) 0.9 %
10 SYRINGE (ML) INJECTION EVERY 12 HOURS PRN
Status: DISCONTINUED | OUTPATIENT
Start: 2023-06-02 | End: 2023-06-16 | Stop reason: HOSPADM

## 2023-06-02 RX ORDER — GLUCAGON 1 MG
1 KIT INJECTION
Status: DISCONTINUED | OUTPATIENT
Start: 2023-06-02 | End: 2023-06-16 | Stop reason: HOSPADM

## 2023-06-02 RX ORDER — MUPIROCIN 20 MG/G
OINTMENT TOPICAL 2 TIMES DAILY
Status: DISPENSED | OUTPATIENT
Start: 2023-06-02 | End: 2023-06-07

## 2023-06-02 RX ORDER — VENLAFAXINE HYDROCHLORIDE 37.5 MG/1
37.5 CAPSULE, EXTENDED RELEASE ORAL EVERY MORNING
COMMUNITY
Start: 2023-05-25

## 2023-06-02 RX ORDER — DEXTROSE 40 %
15 GEL (GRAM) ORAL
Status: DISCONTINUED | OUTPATIENT
Start: 2023-06-02 | End: 2023-06-16 | Stop reason: HOSPADM

## 2023-06-02 RX ORDER — HYDROMORPHONE HYDROCHLORIDE 1 MG/ML
1 INJECTION, SOLUTION INTRAMUSCULAR; INTRAVENOUS; SUBCUTANEOUS ONCE
Status: COMPLETED | OUTPATIENT
Start: 2023-06-02 | End: 2023-06-02

## 2023-06-02 RX ORDER — NALOXONE HCL 0.4 MG/ML
0.02 VIAL (ML) INJECTION
Status: DISCONTINUED | OUTPATIENT
Start: 2023-06-02 | End: 2023-06-16 | Stop reason: HOSPADM

## 2023-06-02 RX ORDER — QUETIAPINE FUMARATE 100 MG/1
100 TABLET, FILM COATED ORAL NIGHTLY
Status: DISCONTINUED | OUTPATIENT
Start: 2023-06-02 | End: 2023-06-02

## 2023-06-02 RX ORDER — DEXTROSE 40 %
30 GEL (GRAM) ORAL
Status: DISCONTINUED | OUTPATIENT
Start: 2023-06-02 | End: 2023-06-16 | Stop reason: HOSPADM

## 2023-06-02 RX ORDER — HYDROMORPHONE HYDROCHLORIDE 1 MG/ML
1 INJECTION, SOLUTION INTRAMUSCULAR; INTRAVENOUS; SUBCUTANEOUS EVERY 6 HOURS PRN
Status: DISCONTINUED | OUTPATIENT
Start: 2023-06-02 | End: 2023-06-08

## 2023-06-02 RX ORDER — DEXMEDETOMIDINE HYDROCHLORIDE 4 UG/ML
0-1.4 INJECTION, SOLUTION INTRAVENOUS CONTINUOUS
Status: DISCONTINUED | OUTPATIENT
Start: 2023-06-02 | End: 2023-06-06

## 2023-06-02 RX ORDER — CALCIUM GLUCONATE 20 MG/ML
1 INJECTION, SOLUTION INTRAVENOUS ONCE
Status: COMPLETED | OUTPATIENT
Start: 2023-06-02 | End: 2023-06-02

## 2023-06-02 RX ORDER — HYDROMORPHONE HYDROCHLORIDE 1 MG/ML
0.5 INJECTION, SOLUTION INTRAMUSCULAR; INTRAVENOUS; SUBCUTANEOUS ONCE
Status: COMPLETED | OUTPATIENT
Start: 2023-06-02 | End: 2023-06-02

## 2023-06-02 RX ORDER — LIDOCAINE 50 MG/G
1 PATCH TOPICAL
Status: DISCONTINUED | OUTPATIENT
Start: 2023-06-02 | End: 2023-06-16 | Stop reason: HOSPADM

## 2023-06-02 RX ADMIN — HUMAN INSULIN 6 UNITS: 100 INJECTION, SOLUTION SUBCUTANEOUS at 06:06

## 2023-06-02 RX ADMIN — DEXTROSE MONOHYDRATE 100 MG: 50 INJECTION, SOLUTION INTRAVENOUS at 07:06

## 2023-06-02 RX ADMIN — DEXTROSE MONOHYDRATE 250 ML: 100 INJECTION, SOLUTION INTRAVENOUS at 05:06

## 2023-06-02 RX ADMIN — DEXMEDETOMIDINE HYDROCHLORIDE 0.2 MCG/KG/HR: 400 INJECTION INTRAVENOUS at 08:06

## 2023-06-02 RX ADMIN — SODIUM CHLORIDE, POTASSIUM CHLORIDE, SODIUM LACTATE AND CALCIUM CHLORIDE 2082 ML: 600; 310; 30; 20 INJECTION, SOLUTION INTRAVENOUS at 01:06

## 2023-06-02 RX ADMIN — DEXTROSE MONOHYDRATE 250 ML: 100 INJECTION, SOLUTION INTRAVENOUS at 08:06

## 2023-06-02 RX ADMIN — DEXTROSE MONOHYDRATE 125 ML: 100 INJECTION, SOLUTION INTRAVENOUS at 10:06

## 2023-06-02 RX ADMIN — SODIUM CHLORIDE, POTASSIUM CHLORIDE, SODIUM LACTATE AND CALCIUM CHLORIDE 1000 ML: 600; 310; 30; 20 INJECTION, SOLUTION INTRAVENOUS at 08:06

## 2023-06-02 RX ADMIN — LEVETIRACETAM 500 MG: 500 TABLET, FILM COATED ORAL at 08:06

## 2023-06-02 RX ADMIN — SODIUM BICARBONATE: 84 INJECTION, SOLUTION INTRAVENOUS at 03:06

## 2023-06-02 RX ADMIN — CALCIUM GLUCONATE 1 G: 20 INJECTION, SOLUTION INTRAVENOUS at 02:06

## 2023-06-02 RX ADMIN — MUPIROCIN: 20 OINTMENT TOPICAL at 08:06

## 2023-06-02 RX ADMIN — LIDOCAINE PATCH 5% 1 PATCH: 700 PATCH TOPICAL at 04:06

## 2023-06-02 RX ADMIN — HUMAN INSULIN 6.94 UNITS: 100 INJECTION, SOLUTION SUBCUTANEOUS at 09:06

## 2023-06-02 RX ADMIN — HYDROMORPHONE HYDROCHLORIDE 1 MG: 1 INJECTION, SOLUTION INTRAMUSCULAR; INTRAVENOUS; SUBCUTANEOUS at 07:06

## 2023-06-02 RX ADMIN — SODIUM ZIRCONIUM CYCLOSILICATE 10 G: 10 POWDER, FOR SUSPENSION ORAL at 08:06

## 2023-06-02 RX ADMIN — PIPERACILLIN AND TAZOBACTAM 4.5 G: 4; .5 INJECTION, POWDER, LYOPHILIZED, FOR SOLUTION INTRAVENOUS; PARENTERAL at 10:06

## 2023-06-02 RX ADMIN — PIPERACILLIN AND TAZOBACTAM 4.5 G: 4; .5 INJECTION, POWDER, LYOPHILIZED, FOR SOLUTION INTRAVENOUS; PARENTERAL at 02:06

## 2023-06-02 RX ADMIN — DEXTROSE MONOHYDRATE 125 ML: 100 INJECTION, SOLUTION INTRAVENOUS at 09:06

## 2023-06-02 RX ADMIN — HYDROMORPHONE HYDROCHLORIDE 0.5 MG: 1 INJECTION, SOLUTION INTRAMUSCULAR; INTRAVENOUS; SUBCUTANEOUS at 09:06

## 2023-06-02 RX ADMIN — CALCIUM GLUCONATE 1 G: 20 INJECTION, SOLUTION INTRAVENOUS at 09:06

## 2023-06-02 RX ADMIN — NOREPINEPHRINE BITARTRATE 0.1 MCG/KG/MIN: 4 INJECTION, SOLUTION INTRAVENOUS at 01:06

## 2023-06-02 RX ADMIN — VANCOMYCIN HYDROCHLORIDE 1750 MG: 1 INJECTION, POWDER, LYOPHILIZED, FOR SOLUTION INTRAVENOUS at 02:06

## 2023-06-02 RX ADMIN — NOREPINEPHRINE BITARTRATE 0.15 MCG/KG/MIN: 4 INJECTION, SOLUTION INTRAVENOUS at 07:06

## 2023-06-02 RX ADMIN — SODIUM CHLORIDE, POTASSIUM CHLORIDE, SODIUM LACTATE AND CALCIUM CHLORIDE 1000 ML: 600; 310; 30; 20 INJECTION, SOLUTION INTRAVENOUS at 01:06

## 2023-06-02 RX ADMIN — HEPARIN SODIUM 12 UNITS/KG/HR: 10000 INJECTION, SOLUTION INTRAVENOUS at 10:06

## 2023-06-02 NOTE — Clinical Note
Diagnosis: Septic shock [449062]   Admitting Provider:: MAI BARNES [[id number]]   Future Attending Provider: MAI BARNES [[id number]]   Reason for IP Medical Treatment  (Clinical interventions that can only be accomplished in the IP setting? ) :: iv abx   I certify that Inpatient services for greater than or equal to 2 midnights are medically necessary:: Yes   Plans for Post-Acute care--if anticipated (pick the single best option):: A. No post acute care anticipated at this time

## 2023-06-02 NOTE — PROGRESS NOTES
Pharmacokinetic Initial Assessment: IV Vancomycin    Assessment/Plan:    Initiate intravenous vancomycin with loading dose of 1750 mg once with subsequent doses when random concentrations are less than 20 mcg/mL  Desired empiric serum trough concentration is 15 to 20 mcg/mL  Draw vancomycin random level on 6/4/2023 at 0430.  Pharmacy will continue to follow and monitor vancomycin.      Please contact pharmacy at extension [phone] with any questions regarding this assessment.     Thank you for the consult,   Mari Do       Patient brief summary:  Ryan Wild is a 32 y.o. male initiated on antimicrobial therapy with IV Vancomycin for treatment of suspected sepsis    Drug Allergies:   Review of patient's allergies indicates:  No Known Allergies    Actual Body Weight:   69.4 kg    Renal Function:   Estimated Creatinine Clearance: 26.1 mL/min (A) (based on SCr of 3.77 mg/dL (H)).,     Dialysis Method (if applicable):  N/A    CBC (last 72 hours):  Recent Labs   Lab Result Units 06/02/23  1334   WBC x10(3)/mcL 42.10*   Hgb g/dL 15.1   Hct % 50.1   Platelet x10(3)/mcL 485*   Mono % % 3.5   Eos % % 0.0   Basophil % % 0.4       Metabolic Panel (last 72 hours):  Recent Labs   Lab Result Units 06/02/23  1334   Sodium Level mmol/L 138   Potassium Level mmol/L 6.9*   Chloride mmol/L 102   Carbon Dioxide mmol/L 14*   Glucose Level mg/dL 95   Blood Urea Nitrogen mg/dL 33.1*   Creatinine mg/dL 3.77*   Albumin Level g/dL 3.9   Bilirubin Total mg/dL 0.2   Alkaline Phosphatase unit/L 135   Aspartate Aminotransferase unit/L 867*   Alanine Aminotransferase unit/L 558*   Magnesium Level mg/dL 2.90*   Phosphorus Level mg/dL 17.3*       Drug levels (last 3 results):  No results for input(s): VANCOMYCINRA, VANCORANDOM, VANCOMYCINPE, VANCOPEAK, VANCOMYCINTR, VANCOTROUGH in the last 72 hours.    Microbiologic Results:  Microbiology Results (last 7 days)       Procedure Component Value Units Date/Time    Blood culture x two cultures. Draw prior  to antibiotics. [[id number]] Collected: 06/02/23 1436    Order Status: Sent Specimen: Blood from Hand, Left Updated: 06/02/23 1437    Blood culture x two cultures. Draw prior to antibiotics. [[id number]] Collected: 06/02/23 1419    Order Status: Sent Specimen: Blood from Arm, Left Updated: 06/02/23 1436

## 2023-06-02 NOTE — ED PROVIDER NOTES
Encounter Date: 6/2/2023       History     Chief Complaint   Patient presents with    Back Pain    Hypotension    Hearing Problem     PT REPORTS WAKING UP W LOWER BACK PAIN, WEAKNESS, SOB AND HEARING LOSS. 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  DENIES DRUG USE. HX OF SEIZURES, NON COMPLIANT W MEDS > 1 MONTH.  .  EKG OBTAINED.      Presents with lower back pain today. 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 States Hx of seizures, not taking his Keppra and Seroquel. Denies drug use. States having shortness of breath and hearing loss.    The history is provided by the patient.   Review of patient's allergies indicates:  No Known Allergies  Past Medical History:   Diagnosis Date    Depression     Opioid abuse     Seizures     Unspecified viral hepatitis C without hepatic coma      Past Surgical History:   Procedure Laterality Date    TONSILLECTOMY       Family History   Problem Relation Age of Onset    Cerebral aneurysm Father      Social History     Tobacco Use    Smoking status: Every Day     Types: Vaping with nicotine    Smokeless tobacco: Current   Substance Use Topics    Alcohol use: Not Currently    Drug use: Not Currently     Types: Heroin, Methamphetamines     Comment: 3 years sober     Review of Systems   Constitutional:  Negative for fever.   HENT:  Negative for sore throat.    Respiratory:  Negative for shortness of breath.    Cardiovascular:  Negative for chest pain.   Gastrointestinal:  Negative for nausea.   Genitourinary:  Negative for dysuria.   Musculoskeletal:  Positive for back pain.   Skin:  Negative for rash.   Neurological:  Negative for weakness.   Hematological:  Does not bruise/bleed easily.   All other systems reviewed and are negative.    Physical Exam     Initial Vitals [06/02/23 1310]   BP Pulse Resp Temp SpO2   (!) 85/51 71 18 96.8 °F (36 °C) (!) 89 %      MAP       --         Physical Exam    Nursing note and  vitals reviewed.  Constitutional: He appears well-developed. He appears distressed (Moderate).   HENT:   Head: Normocephalic and atraumatic.   Dry oral mucosa   Eyes: Conjunctivae and EOM are normal. Pupils are equal, round, and reactive to light.   Neck: Neck supple. No thyromegaly present. No tracheal deviation present. No JVD present.   Normal range of motion.  Cardiovascular:  Regular rhythm, normal heart sounds and intact distal pulses.           Tachycardic   Pulmonary/Chest: Breath sounds normal. No stridor. No respiratory distress.   Lip cyanosis   Abdominal: Abdomen is soft. Bowel sounds are normal. He exhibits no distension. There is no abdominal tenderness. There is no rebound and no guarding.   Musculoskeletal:         General: No edema. Normal range of motion.      Cervical back: Normal range of motion and neck supple.     Neurological: He is alert and oriented to person, place, and time. He has normal strength. GCS score is 15. GCS eye subscore is 4. GCS verbal subscore is 5. GCS motor subscore is 6.   Skin: Skin is warm and dry. No rash noted. No erythema.   Psychiatric: His behavior is normal.       ED Course   ED US FAST    Date/Time: 6/2/2023 1:48 PM  Performed by: Juan Manuel Miranda MD  Authorized by: Juan Manuel Miranda MD     Indication:  Hypotension  Identified Structures:  The pericardium, hepatorenal space, splenorenal space, and pelvic cul-de-sac were examined and The right and left hemithoraces were also examined  The following findings in the peritoneal, pericardial, and pleural spaces were obtained:     Pericardial effusion:  Absent    Hepatorenal free fluid:  Absent    Splenorenal free fluid:  Absent    Suprapubic/Pouch of Jared free fluid:  Absent    Right thoracic free fluid:  Absent    Right lung sliding:  Present    Left thoracic free fluid:  Absent    Left lung sliding:  Present    Impression:  No pathologic free fluid    Charge?:  No (educational)  ED US  Echo    Date/Time: 6/2/2023 2:54 PM  Performed by: Juan Manuel Miranda MD  Authorized by: Juan Manuel Miranda MD     Indication:  Hypotension  Identified Structures:     The pericardial sac, myocardium, and 4 chambers were identified with the following echocardiographic windows:  Parasternal long axis and IVC  Findings:     Pericardial Effusion:  Absent    Left Ventricle Ejection Fraction:  Normal (>55%)  IVC:     Diameter:  < 2cm    Collapsibility:  < 50%    Gross Lud[place] (RV:LV):  Normal    Impression:  Hypovolemia    Charge?:  No (educational)  Critical Care    Date/Time: 6/2/2023 2:55 PM  Performed by: Juan Manuel Miranda MD  Authorized by: Juan Manuel Miranda MD   Direct patient critical care time: 15 minutes  Additional history critical care time: 5 minutes  Ordering / reviewing critical care time: 20 minutes  Documentation critical care time: 8 minutes  Consulting other physicians critical care time: 5 minutes  Total critical care time (exclusive of procedural time) : 53 minutes  Critical care was necessary to treat or prevent imminent or life-threatening deterioration of the following conditions: circulatory failure, renal failure, shock, respiratory failure and sepsis.  Critical care was time spent personally by me on the following activities: development of treatment plan with patient or surrogate, discussions with consultants, interpretation of cardiac output measurements, evaluation of patient's response to treatment, examination of patient, obtaining history from patient or surrogate, ordering and performing treatments and interventions, ordering and review of laboratory studies, ordering and review of radiographic studies, pulse oximetry, re-evaluation of patient's condition, review of old charts and ventilator management.      Labs Reviewed   COMPREHENSIVE METABOLIC PANEL - Abnormal; Notable for the following components:       Result Value    Potassium Level 6.9  (*)     Carbon Dioxide 14 (*)     Blood Urea Nitrogen 33.1 (*)     Creatinine 3.77 (*)     Calcium Level Total 7.5 (*)     Alanine Aminotransferase 558 (*)     Aspartate Aminotransferase 867 (*)     All other components within normal limits   LACTIC ACID, PLASMA - Abnormal; Notable for the following components:    Lactic Acid Level 8.0 (*)     All other components within normal limits   MAGNESIUM - Abnormal; Notable for the following components:    Magnesium Level 2.90 (*)     All other components within normal limits   PHOSPHORUS - Abnormal; Notable for the following components:    Phosphorus Level 17.3 (*)     All other components within normal limits   TROPONIN I - Abnormal; Notable for the following components:    Troponin-I 1.878 (*)     All other components within normal limits   CBC WITH DIFFERENTIAL - Abnormal; Notable for the following components:    WBC 42.10 (*)     MCV 98.6 (*)     MCHC 30.1 (*)     Platelet 485 (*)     Neut # 38.45 (*)     Mono # 1.46 (*)     IG# 1.01 (*)     All other components within normal limits   CK - Abnormal; Notable for the following components:    Creatine Kinase 37,420 (*)     All other components within normal limits   URIC ACID - Abnormal; Notable for the following components:    Uric Acid 10.8 (*)     All other components within normal limits   LACTATE DEHYDROGENASE - Abnormal; Notable for the following components:    Lactate Dehydrogenase 2,294 (*)     All other components within normal limits   POCT GLUCOSE - Abnormal; Notable for the following components:    POCT Glucose 115 (*)     All other components within normal limits   ALCOHOL,MEDICAL (ETHANOL) - Normal   BLOOD CULTURE [facility]   BLOOD CULTURE [facility]   CBC W/ AUTO DIFFERENTIAL    Narrative:     The following orders were created for panel order CBC auto differential.  Procedure                               Abnormality         Status                     ---------                               -----------         ------                      CBC with Differential[[id number]]        Abnormal            Final result                 Please view results for these tests on the individual orders.   SALICYLATE LEVEL   URINALYSIS, REFLEX TO URINE CULTURE   D DIMER, QUANTITATIVE   DRUG SCREEN, URINE (BEAKER)   PROTIME-INR   APTT   COVID/FLU A&B PCR   BLOOD GAS   EXTRA TUBES    Narrative:     The following orders were created for panel order EXTRA TUBES.  Procedure                               Abnormality         Status                     ---------                               -----------         ------                     Red Top Hold[[id number]]                                     In process                 Light Green Top Hold[[id number]]                             In process                 Pink Top Hold[[id number]]                                    In process                   Please view results for these tests on the individual orders.   RED TOP HOLD   LIGHT GREEN TOP HOLD   PINK TOP HOLD     EKG Readings: (Independently Interpreted)   Initial Reading: No STEMI. Rhythm: Sinus Tachycardia. Heart Rate: 108. Ectopy: No Ectopy. Conduction: Normal. ST Segments: Normal ST Segments. T Waves: Normal. Axis: Normal. Clinical Impression: Sinus Tachycardia   ECG Results              EKG 12-lead (In process)  Result time 06/02/23 14:20:24      In process by Interface, Lab In [facility] (06/02/23 14:20:24)                   Narrative:    Test Reason : I95.9,    Vent. Rate : 108 BPM     Atrial Rate : 108 BPM     P-R Int : 144 ms          QRS Dur : 086 ms      QT Int : 330 ms       P-R-T Axes : 079 083 027 degrees     QTc Int : 442 ms    Sinus tachycardia  Minimal voltage criteria for LVH, may be normal variant  Borderline Abnormal ECG  No previous ECGs available    Referred By: [email or web address]   SELF           Confirmed By:                                   Imaging Results              X-Ray Chest AP Portable (Final result)  Result time 06/02/23 13:48:55       Final result by Franco Gonzáles MD (06/02/23 13:48:55)                   Impression:      Findings concerning for right mid to lower lung infectious process.      Electronically signed by: Franco Gonzáles  Date:    06/02/2023  Time:    13:48               Narrative:    EXAMINATION:  XR CHEST AP PORTABLE    CLINICAL HISTORY:  Sepsis;    TECHNIQUE:  Single view of the chest    COMPARISON:  No prior imaging available for comparison.    FINDINGS:  Increased interstitial markings in the right greater than left lung.    The cardiomediastinal silhouette is within normal limits.    No acute osseous abnormality.                                       Medications   lactated ringers bolus 2,082 mL (0 mLs Intravenous Stopped 6/2/23 1506)   vancomycin (VANCOCIN) 1,750 mg in sodium chloride 0.9% 500 mL IVPB (1,750 mg Intravenous New Bag 6/2/23 1428)   NORepinephrine 4 mg in dextrose 5% 250 mL infusion (premix) (titrating) (0.1 mcg/kg/min × 69.4 kg Intravenous New Bag 6/2/23 1406)   sodium bicarbonate 150 mEq in dextrose 5 % (D5W) 1,150 mL infusion (has no administration in time range)   calcium gluconate 1 g in NS IVPB (premixed) (1 g Intravenous New Bag 6/2/23 1458)   piperacillin-tazobactam (ZOSYN) 4.5 g in sodium chloride 0.9 % 100 mL IVPB (MB+) (0 g Intravenous Stopped 6/2/23 1449)   lactated ringers bolus 1,000 mL (0 mLs Intravenous Stopped 6/2/23 1449)     Medical Decision Making:   Differential Diagnosis:   Lumbar fracture, spinal stenosis, epidural abscess, spine osteomyelitis, MS, cauda equina, among others    Pneumonia, Asthma exacerbation, COPD exacerbation, Pericardial Effusion, Hear Failure, Pulmonary Emboli, Pleural effusion, malignancy, among others      Independently Interpreted Test(s):   I have ordered and independently interpreted X-rays - see prior notes.  I have ordered and independently interpreted EKG Reading(s) - see prior notes  Clinical Tests:   Lab Tests: Ordered  Radiological Study: Ordered  Medical  Tests: Ordered                 2:05 PM    At this time /85 mmHg in Levophed at 0.1 mg/kg/min; ; RR 20 with pulse Ox 96% in BiPAP (10/5; 100%).  CXR concerning for pneumonia with significant WBC, positive troponin. Concern for endocarditis with septic emboli vs PE with renal failure in the Diff Dx. Will F/U    At this time 2:56 PM pt vital signs are     /49 mmHg in Levophed at 0.1 mcg/kg/min; RR 26 rpm in BiPAP (10/5/100%);  bpm, Pulse Ox 100% in BiPAP  . Pt is (AAOx3)  his cardiorespiratory re-assessment reveals RRR, Tachycardic, CTA x 2. Fluid resuscitation with 3000 ml of RL given and the capillary refill is 4-5 sec, the skin color is pale and temperature is cold. Intravascular volume was assess by Fluid challenge with 2.5 L  mL reveal no response. Initial lactic acid of 8 and repeat one ordered for 16:00    Due to an inadequate response to adequate fluid resuscitation this patient was started on vasopressor therapy with Levophed and will be admitted to ICU service.         Clinical Impression:   Final diagnoses:  [I95.9] Hypotension  [R53.1] Weakness  [A41.9, R65.21] Septic shock (Primary)  [J18.9] Community acquired pneumonia of right lung, unspecified part of lung  [N17.9] Acute renal failure, unspecified acute renal failure type  [E87.20] Metabolic acidosis  [E87.5] Hyperkalemia  [M62.82] Non-traumatic rhabdomyolysis  [R77.8] Troponin I above reference range        ED Disposition Condition    Admit Critical                Juan Manuel Miranda MD  06/02/23 1500

## 2023-06-02 NOTE — H&P
[facility] - Emergency Dept    History & Physical      Patient Name: Ryan Wild  MRN: [id number]  Admission Date: 6/2/2023  Attending Physician: Dewayne Torres MD   Primary Care Provider: Adilene Dillon NP         Patient information was obtained from patient and ER records.     Subjective:     Principal Problem:Non-traumatic rhabdomyolysis    Chief Complaint:   Chief Complaint   Patient presents with    Back Pain    Hypotension    Hearing Problem     PT REPORTS WAKING UP W LOWER BACK PAIN, WEAKNESS, SOB AND HEARING LOSS. 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  DENIES DRUG USE. HX OF SEIZURES, NON COMPLIANT W MEDS > 1 MONTH.  .  EKG OBTAINED.         HPI: Ryan Wild is a 32 y.o. male with history of seizures (off Keppra), drug use, hepatitis C (treated), and solitary kidney who presented to the ED on 6/2/2023  with a primary complaint of back pain. Patient is a  who had worked a 12 hour shift out in the sun the day prior. States that he went to bed feeling great, without any complaints. Then he woke up late for work day of admission and admits to not remembering much of what happened afterwards. His coworkers brought him to the ED. Admits to severe back pain that is aggravated by movement, weakness, shortness of breath. Also has not urinated all day. Denies chest pain, palpitations, headache, nausea, vomiting, abdominal pain, fevers. Last IV drug use 1 year ago, but snorted meth 2 days ago. Also uses IM steroids, last injection last night. Has not taken his home Keppra for the past few months; his last seizure was on 8/2022.      In the ED, patient presented hypotensive, tachycardic, SpO2 89% on RA. He was placed on bipap. Vasopressors were started when patient continued to be hypotensive with IVF. Labs significant for WBC 42.1, K 6.9, CO2 14, Cr 3.77, , . CPK 37k. LDH 2,2k. CBG normal. Troponin  1.878, EKG no ischemic changes. Lactic 8.0. CXR  with increased interstitial markings in the right greater than left lung. Bedside Echo no abnormalities. Pt was given Vanc/Zosyn x1, and 4L bolus NS total. Medicine was called to admit patient for septic shock with multiorgan dysfunction.    Past Medical History:   Diagnosis Date    Depression     Opioid abuse     Seizures     Solitary kidney, congenital     Unspecified viral hepatitis C without hepatic coma        Past Surgical History:   Procedure Laterality Date    TONSILLECTOMY         Review of patient's allergies indicates:  No Known Allergies    No current facility-administered medications on file prior to encounter.     Current Outpatient Medications on File Prior to Encounter   Medication Sig    albuterol (PROVENTIL/VENTOLIN HFA) 90 mcg/actuation inhaler INHALE 2 PUFFS BY MOUTH EVERY 6 HOURS AS NEEDED FOR SHORTNESS OF BREATH OR WHEEZING    fluticasone propionate (FLONASE) 50 mcg/actuation nasal spray SHAKE LIQUID AND USE 2 SPRAYS IN EACH NOSTRIL DAILY    levETIRAcetam (KEPPRA) 500 MG Tab Take 1 tablet by mouth 2 (two) times daily.    QUEtiapine (SEROQUEL) 100 MG Tab Take 1 tablet (100 mg total) by mouth nightly.    valACYclovir (VALTREX) 1000 MG tablet Take 1,000 mg by mouth 3 (three) times daily.    venlafaxine (EFFEXOR-XR) 37.5 MG 24 hr capsule Take 37.5 mg by mouth every morning.     Family History       Problem Relation (Age of Onset)    Cerebral aneurysm Father          Tobacco Use    Smoking status: Every Day     Types: Vaping with nicotine    Smokeless tobacco: Current   Substance and Sexual Activity    Alcohol use: Not Currently    Drug use: Not Currently     Types: Heroin, Methamphetamines     Comment: 3 years sober    Sexual activity: Yes     Partners: Female     Review of Systems   Constitutional:  Negative for chills and fever.   Gastrointestinal:  Negative for abdominal pain, nausea and vomiting.   Genitourinary:  Positive for difficulty  urinating.   Musculoskeletal:  Positive for back pain.   Neurological:  Negative for headaches.   Psychiatric/Behavioral:  Negative for hallucinations. The patient is nervous/anxious.    Objective:     Vital Signs (Most Recent):  Temp: 97.8 °F (36.6 °C) (06/02/23 2035)  Pulse: 93 (06/02/23 2056)  Resp: (!) 24 (06/02/23 2056)  BP: 119/68 (06/02/23 2035)  SpO2: 100 % (06/02/23 2056) Vital Signs (24h Range):  Temp:  [96.8 °F (36 °C)-97.8 °F (36.6 °C)] 97.8 °F (36.6 °C)  Pulse:  [] 93  Resp:  [12-36] 24  SpO2:  [82 %-100 %] 100 %  BP: ()/(49-92) 119/68  Arterial Line BP: ()/(65-86) 121/74     Weight: 69.4 kg (153 lb)  Body mass index is 23.96 kg/m².    Physical Exam  Constitutional:       General: He is awake. He is in acute distress.      Appearance: Normal appearance. He is normal weight. He is ill-appearing.      Comments: Lying in bed on BIPAP. Groaning in pain.   Cardiovascular:      Rate and Rhythm: Normal rate and regular rhythm.      Pulses: Normal pulses.           Dorsalis pedis pulses are 2+ on the right side and 2+ on the left side.      Heart sounds: Normal heart sounds.      Arteriovenous access: Right and left arteriovenous access is present.  Pulmonary:      Effort: Pulmonary effort is normal.      Breath sounds: Normal air entry. Wheezing present.   Abdominal:      General: Abdomen is flat. Bowel sounds are normal.      Palpations: Abdomen is soft.      Tenderness: There is no abdominal tenderness. There is no guarding.   Musculoskeletal:      Lumbar back: Deformity and tenderness present. No signs of trauma or lacerations.      Right lower leg: No edema.      Left lower leg: No edema.      Comments: Bilateral lumbar firmness. Decreased range of motion due to discomfort.   Feet:      Right foot:      Skin integrity: Skin integrity normal.      Left foot:      Skin integrity: Skin integrity normal.   Skin:     General: Skin is warm.      Capillary Refill: Capillary refill takes more  than 3 seconds.      Coloration: Skin is sallow. Skin is not cyanotic, jaundiced or mottled.   Neurological:      Mental Status: He is alert and oriented to person, place, and time.      GCS: GCS eye subscore is 4. GCS verbal subscore is 5. GCS motor subscore is 6.   Psychiatric:         Behavior: Behavior is cooperative.          Significant Labs: All pertinent labs within the past 24 hours have been reviewed.    Significant Imaging: I have reviewed all pertinent imaging results/findings within the past 24 hours.    Assessment/Plan:     Active Diagnoses:    Diagnosis Date Noted POA    PRINCIPAL PROBLEM:  Non-traumatic rhabdomyolysis [M62.82] 06/02/2023 Yes    KINGSLEY (acute kidney injury) [N17.9] 06/02/2023 Yes    NSTEMI (non-ST elevated myocardial infarction) [I21.4] 06/02/2023 Yes    Shock [R57.9] 06/02/2023 Yes    Solitary kidney, congenital [Q60.0] 06/02/2023 Not Applicable    Hyperkalemia [E87.5] 06/02/2023 Yes    Hypermagnesemia [E83.41] 06/02/2023 Yes    Elevated levels of transaminase & lactic acid dehydrogenase [R74.01, R74.02] 06/02/2023 Yes    Seizure disorder [G40.909] 08/13/2022 Yes      Problems Resolved During this Admission:     VTE Risk Mitigation (From admission, onward)           Ordered     heparin 25,000 units in dextrose 5% (100 units/ml) IV bolus from bag - ADDITIONAL PRN BOLUS - 60 units/kg (max bolus 4000 units)  As needed (PRN)        Question:  Heparin Infusion Adjustment (DO NOT MODIFY ANSWER)  Answer:  [email or web address]\Images\Pharmacy\HeparinInfusions\heparin LOW INTENSITY nomogram for [facility] [id number]    06/02/23 2035     heparin 25,000 units in dextrose 5% (100 units/ml) IV bolus from bag - ADDITIONAL PRN BOLUS - 30 units/kg (max bolus 4000 units)  As needed (PRN)        Question:  Heparin Infusion Adjustment (DO NOT MODIFY ANSWER)  Answer:  \[email or web address]\epic\Images\Pharmacy\HeparinInfusions\heparin LOW INTENSITY nomogram for [facility] [id number]    06/02/23 2035     heparin 25,000 units in  dextrose 5% (100 units/ml) IV bolus from bag INITIAL BOLUS (max bolus 4000 units)  Once        Question:  Heparin Infusion Adjustment (DO NOT MODIFY ANSWER)  Answer:  [email or web address]\Images\Pharmacy\HeparinInfusions\heparin LOW INTENSITY nomogram for [facility] [id number]    06/02/23 2035     heparin 25,000 units in dextrose 5% 250 mL (100 units/mL) infusion LOW INTENSITY nomogram - [facility]  Continuous        Question Answer Comment   Begin at (in units/kg/hr) 12    Heparin Infusion Adjustment (DO NOT MODIFY ANSWER) [email or web address]\epic\Images\Pharmacy\HeparinInfusions\heparin LOW INTENSITY nomogram for [facility] [id number]        06/02/23 2035     IP VTE HIGH RISK PATIENT  Once         06/02/23 1543     Place sequential compression device  Until discontinued         06/02/23 1543                  Admit to ICU for hypotension secondary to septic shock requiring pressors.  - Levophed .2mcg. BP responding. Keeping MAP 75-80.  - IV Fluids (LR Bolus)  -BIPAP at   - Shifting potassium. Received Ca Gluconate, Insulin 6u + D10 in ED. Lokelma not given due to rapid desaturation off PPV.  -NaBicarb drip  -Consulted Nephrology for recommendations. Emergent Dialysis tonight. General Surgery to place central line.  -Precedex at 0.2mcg/kg/hr  - Restarted Keppra. Keppra level pending. Patient reports being off medication for months.    Dillon Dominguez MD  Department of Hospital Medicine   [facility] - Emergency Dept

## 2023-06-03 LAB
A-ADO2 BLOOD GAS (OHS): 596 MMHG
A-ADO2 BLOOD GAS (OHS): 602 MMHG
A-ADO2 BLOOD GAS (OHS): 602 MMHG
A-ADO2 BLOOD GAS (OHS): 608 MMHG
ALBUMIN SERPL-MCNC: 2.8 G/DL (ref 3.5–5)
ALBUMIN/GLOB SERPL: 1.2 RATIO (ref 1.1–2)
ALLENS TEST BLOOD GAS (OHS): ABNORMAL
ALP SERPL-CCNC: 39 UNIT/L (ref 40–150)
ALT SERPL-CCNC: 467 UNIT/L (ref 0–55)
ANION GAP SERPL CALC-SCNC: 10 MEQ/L
ANION GAP SERPL CALC-SCNC: 6 MEQ/L
ANION GAP SERPL CALC-SCNC: 9 MEQ/L
ANION GAP SERPL CALC-SCNC: 9 MEQ/L
APTT PPP: 104.2 SECONDS
APTT PPP: 50.6 SECONDS
APTT PPP: 65.9 SECONDS
AST SERPL-CCNC: 707 UNIT/L (ref 5–34)
BASE EXCESS BLD CALC-SCNC: 1.2 MMOL/L (ref -2–2)
BASE EXCESS BLD CALC-SCNC: 4.8 MMOL/L (ref -2–2)
BASE EXCESS BLD CALC-SCNC: 5.1 MMOL/L (ref -2–2)
BASE EXCESS BLD CALC-SCNC: 5.6 MMOL/L (ref -2–2)
BASOPHILS # BLD AUTO: 0.06 X10(3)/MCL
BASOPHILS NFR BLD AUTO: 0.2 %
BILIRUBIN DIRECT+TOT PNL SERPL-MCNC: 0.4 MG/DL
BIPAP(E) BLOOD GAS (OHS): 12 CM H2O
BIPAP(I) BLOOD GAS (OHS): 18 CM H2O
BLOOD GAS SAMPLE TYPE (OHS): ABNORMAL
BUN SERPL-MCNC: 22.7 MG/DL (ref 8.9–20.6)
BUN SERPL-MCNC: 24.6 MG/DL (ref 8.9–20.6)
BUN SERPL-MCNC: 26.5 MG/DL (ref 8.9–20.6)
BUN SERPL-MCNC: 29 MG/DL (ref 8.9–20.6)
BUN SERPL-MCNC: 31.3 MG/DL (ref 8.9–20.6)
CALCIUM SERPL-MCNC: 6.6 MG/DL (ref 8.4–10.2)
CALCIUM SERPL-MCNC: 6.6 MG/DL (ref 8.4–10.2)
CALCIUM SERPL-MCNC: 6.7 MG/DL (ref 8.4–10.2)
CALCIUM SERPL-MCNC: 6.7 MG/DL (ref 8.4–10.2)
CALCIUM SERPL-MCNC: 7 MG/DL (ref 8.4–10.2)
CHLORIDE SERPL-SCNC: 102 MMOL/L (ref 98–107)
CHLORIDE SERPL-SCNC: 96 MMOL/L (ref 98–107)
CHLORIDE SERPL-SCNC: 98 MMOL/L (ref 98–107)
CHLORIDE SERPL-SCNC: 99 MMOL/L (ref 98–107)
CHLORIDE SERPL-SCNC: 99 MMOL/L (ref 98–107)
CK SERPL-CCNC: ABNORMAL U/L (ref 30–200)
CO2 BLDA-SCNC: 27.3 MMOL/L (ref 22–26)
CO2 BLDA-SCNC: 31.3 MMOL/L (ref 22–26)
CO2 BLDA-SCNC: 31.9 MMOL/L (ref 22–26)
CO2 BLDA-SCNC: 32 MMOL/L (ref 22–26)
CO2 SERPL-SCNC: 22 MMOL/L (ref 22–29)
CO2 SERPL-SCNC: 24 MMOL/L (ref 22–29)
COHGB MFR BLDA: 1.5 % (ref 0.5–1.5)
COHGB MFR BLDA: 1.6 % (ref 0.5–1.5)
COHGB MFR BLDA: 1.6 % (ref 0.5–1.5)
COHGB MFR BLDA: 1.7 % (ref 0.5–1.5)
CORTIS SERPL-SCNC: 14.9 UG/DL
CORTIS SERPL-SCNC: 36.2 UG/DL
CREAT SERPL-MCNC: 2.07 MG/DL (ref 0.73–1.18)
CREAT SERPL-MCNC: 2.42 MG/DL (ref 0.73–1.18)
CREAT SERPL-MCNC: 2.68 MG/DL (ref 0.73–1.18)
CREAT SERPL-MCNC: 2.72 MG/DL (ref 0.73–1.18)
CREAT SERPL-MCNC: 2.93 MG/DL (ref 0.73–1.18)
CREAT/UREA NIT SERPL: 10
CREAT/UREA NIT SERPL: 11
CREAT/UREA NIT SERPL: 12
CREAT/UREA NIT SERPL: 9
DRAWN BY BLOOD GAS (OHS): ABNORMAL
EOSINOPHIL # BLD AUTO: 0 X10(3)/MCL (ref 0–0.9)
EOSINOPHIL NFR BLD AUTO: 0 %
EPAP (OHS): 12 CMH2O
ERYTHROCYTE [DISTWIDTH] IN BLOOD BY AUTOMATED COUNT: 12.7 % (ref 11.5–17)
FIO2 BLOOD GAS (OHS): 100 %
GFR SERPLBLD CREATININE-BSD FMLA CKD-EPI: 28 MLS/MIN/1.73/M2
GFR SERPLBLD CREATININE-BSD FMLA CKD-EPI: 31 MLS/MIN/1.73/M2
GFR SERPLBLD CREATININE-BSD FMLA CKD-EPI: 31 MLS/MIN/1.73/M2
GFR SERPLBLD CREATININE-BSD FMLA CKD-EPI: 35 MLS/MIN/1.73/M2
GFR SERPLBLD CREATININE-BSD FMLA CKD-EPI: 43 MLS/MIN/1.73/M2
GLOBULIN SER-MCNC: 2.3 GM/DL (ref 2.4–3.5)
GLUCOSE SERPL-MCNC: 106 MG/DL (ref 74–100)
GLUCOSE SERPL-MCNC: 108 MG/DL (ref 74–100)
GLUCOSE SERPL-MCNC: 109 MG/DL (ref 74–100)
GLUCOSE SERPL-MCNC: 78 MG/DL (ref 74–100)
GLUCOSE SERPL-MCNC: 81 MG/DL (ref 74–100)
HCO3 BLDA-SCNC: 26 MMOL/L (ref 22–26)
HCO3 BLDA-SCNC: 29.9 MMOL/L (ref 22–26)
HCO3 BLDA-SCNC: 30.5 MMOL/L (ref 22–26)
HCO3 BLDA-SCNC: 30.6 MMOL/L (ref 22–26)
HCT VFR BLD AUTO: 39.8 % (ref 42–52)
HGB BLD-MCNC: 13.1 G/DL (ref 14–18)
IMM GRANULOCYTES # BLD AUTO: 0.56 X10(3)/MCL (ref 0–0.04)
IMM GRANULOCYTES NFR BLD AUTO: 1.5 %
IPAP (OHS): 18 CMH2O
LYMPHOCYTES # BLD AUTO: 1.61 X10(3)/MCL (ref 0.6–4.6)
LYMPHOCYTES NFR BLD AUTO: 4.4 %
MAGNESIUM SERPL-MCNC: 1.6 MG/DL (ref 1.6–2.6)
MAGNESIUM SERPL-MCNC: 1.6 MG/DL (ref 1.6–2.6)
MCH RBC QN AUTO: 29.6 PG (ref 27–31)
MCHC RBC AUTO-ENTMCNC: 32.9 G/DL (ref 33–36)
MCV RBC AUTO: 89.8 FL (ref 80–94)
METHGB MFR BLDA: 0.6 % (ref 0–1.5)
METHGB MFR BLDA: 0.7 % (ref 0–1.5)
METHGB MFR BLDA: 0.7 % (ref 0–1.5)
METHGB MFR BLDA: 0.8 % (ref 0–1.5)
MODE (OHS): ABNORMAL
MONOCYTES # BLD AUTO: 1.93 X10(3)/MCL (ref 0.1–1.3)
MONOCYTES NFR BLD AUTO: 5.3 %
NEUTROPHILS # BLD AUTO: 32.11 X10(3)/MCL (ref 2.1–9.2)
NEUTROPHILS NFR BLD AUTO: 88.6 %
NRBC BLD AUTO-RTO: 0 %
O2 HB BLOOD GAS (OHS): 85.6 % (ref 94–100)
O2 HB BLOOD GAS (OHS): 87 % (ref 94–100)
O2 HB BLOOD GAS (OHS): 89.1 % (ref 94–100)
O2 HB BLOOD GAS (OHS): 92.7 % (ref 94–100)
OXYHGB MFR BLDA: 12 G/DL (ref 12–18)
OXYHGB MFR BLDA: 12.7 G/DL (ref 12–18)
OXYHGB MFR BLDA: 12.8 G/DL (ref 12–18)
OXYHGB MFR BLDA: 12.9 G/DL (ref 12–18)
PAO2 BLOOD GAS (OHS): 654 MMHG
PAO2 BLOOD GAS (OHS): 657 MMHG
PAO2 BLOOD GAS (OHS): 657 MMHG
PAO2 BLOOD GAS (OHS): 662 MMHG
PCO2 BLDA: 41 MMHG (ref 35–45)
PCO2 BLDA: 45 MMHG (ref 35–45)
PCO2 BLDA: 45 MMHG (ref 35–45)
PCO2 BLDA: 47 MMHG (ref 35–45)
PH BLDA: 7.41 [PH] (ref 7.35–7.45)
PH BLDA: 7.42 [PH] (ref 7.35–7.45)
PH BLDA: 7.43 [PH] (ref 7.35–7.45)
PH BLDA: 7.44 [PH] (ref 7.35–7.45)
PHOSPHATE SERPL-MCNC: 5.6 MG/DL (ref 2.3–4.7)
PLATELET # BLD AUTO: 323 X10(3)/MCL (ref 130–400)
PMV BLD AUTO: 9.3 FL (ref 7.4–10.4)
PO2 BLDA: 49 MMHG (ref 75–100)
PO2 BLDA: 52 MMHG (ref 75–100)
PO2 BLDA: 55 MMHG (ref 75–100)
PO2 BLDA: 66 MMHG (ref 75–100)
POCT GLUCOSE: 113 MG/DL (ref 70–110)
POTASSIUM SERPL-SCNC: 4.4 MMOL/L (ref 3.5–5.1)
POTASSIUM SERPL-SCNC: 4.8 MMOL/L (ref 3.5–5.1)
POTASSIUM SERPL-SCNC: 4.9 MMOL/L (ref 3.5–5.1)
POTASSIUM SERPL-SCNC: 4.9 MMOL/L (ref 3.5–5.1)
POTASSIUM SERPL-SCNC: 5.2 MMOL/L (ref 3.5–5.1)
PROT SERPL-MCNC: 5.1 GM/DL (ref 6.4–8.3)
RBC # BLD AUTO: 4.43 X10(6)/MCL (ref 4.7–6.1)
SAMPLE SITE BLOOD GAS (OHS): ABNORMAL
SAO2 % BLDA: 87.6 %
SAO2 % BLDA: 89 %
SAO2 % BLDA: 91.3 %
SAO2 % BLDA: 95 %
SODIUM SERPL-SCNC: 130 MMOL/L (ref 136–145)
SODIUM SERPL-SCNC: 132 MMOL/L (ref 136–145)
SODIUM SERPL-SCNC: 132 MMOL/L (ref 136–145)
TROPONIN I SERPL-MCNC: 4.6 NG/ML (ref 0–0.04)
TROPONIN I SERPL-MCNC: 5.06 NG/ML (ref 0–0.04)
TROPONIN I SERPL-MCNC: 5.57 NG/ML (ref 0–0.04)
TROPONIN I SERPL-MCNC: 5.61 NG/ML (ref 0–0.04)
VANCOMYCIN SERPL-MCNC: 9 UG/ML (ref 15–20)
WBC # SPEC AUTO: 36.27 X10(3)/MCL (ref 4.5–11.5)

## 2023-06-03 PROCEDURE — 80202 ASSAY OF VANCOMYCIN: CPT | Performed by: STUDENT IN AN ORGANIZED HEALTH CARE EDUCATION/TRAINING PROGRAM

## 2023-06-03 PROCEDURE — 25000003 PHARM REV CODE 250: Performed by: FAMILY MEDICINE

## 2023-06-03 PROCEDURE — 94761 N-INVAS EAR/PLS OXIMETRY MLT: CPT

## 2023-06-03 PROCEDURE — 20000000 HC ICU ROOM

## 2023-06-03 PROCEDURE — 99900035 HC TECH TIME PER 15 MIN (STAT)

## 2023-06-03 PROCEDURE — 85025 COMPLETE CBC W/AUTO DIFF WBC: CPT | Performed by: STUDENT IN AN ORGANIZED HEALTH CARE EDUCATION/TRAINING PROGRAM

## 2023-06-03 PROCEDURE — 80053 COMPREHEN METABOLIC PANEL: CPT

## 2023-06-03 PROCEDURE — 80100014 HC HEMODIALYSIS 1:1

## 2023-06-03 PROCEDURE — 25000003 PHARM REV CODE 250: Performed by: STUDENT IN AN ORGANIZED HEALTH CARE EDUCATION/TRAINING PROGRAM

## 2023-06-03 PROCEDURE — 94660 CPAP INITIATION&MGMT: CPT

## 2023-06-03 PROCEDURE — 63600175 PHARM REV CODE 636 W HCPCS: Performed by: STUDENT IN AN ORGANIZED HEALTH CARE EDUCATION/TRAINING PROGRAM

## 2023-06-03 PROCEDURE — 63600175 PHARM REV CODE 636 W HCPCS: Performed by: FAMILY MEDICINE

## 2023-06-03 PROCEDURE — 85730 THROMBOPLASTIN TIME PARTIAL: CPT | Performed by: FAMILY MEDICINE

## 2023-06-03 PROCEDURE — 63600175 PHARM REV CODE 636 W HCPCS

## 2023-06-03 PROCEDURE — 25000003 PHARM REV CODE 250: Performed by: NURSE PRACTITIONER

## 2023-06-03 PROCEDURE — 83735 ASSAY OF MAGNESIUM: CPT

## 2023-06-03 PROCEDURE — 80048 BASIC METABOLIC PNL TOTAL CA: CPT | Performed by: FAMILY MEDICINE

## 2023-06-03 PROCEDURE — 93005 ELECTROCARDIOGRAM TRACING: CPT

## 2023-06-03 PROCEDURE — 82550 ASSAY OF CK (CPK): CPT | Performed by: NURSE PRACTITIONER

## 2023-06-03 PROCEDURE — 27000249 HC VAPOTHERM CIRCUIT

## 2023-06-03 PROCEDURE — 82533 TOTAL CORTISOL: CPT | Performed by: STUDENT IN AN ORGANIZED HEALTH CARE EDUCATION/TRAINING PROGRAM

## 2023-06-03 PROCEDURE — 82803 BLOOD GASES ANY COMBINATION: CPT

## 2023-06-03 PROCEDURE — 25000003 PHARM REV CODE 250

## 2023-06-03 PROCEDURE — 84484 ASSAY OF TROPONIN QUANT: CPT

## 2023-06-03 PROCEDURE — 85730 THROMBOPLASTIN TIME PARTIAL: CPT | Performed by: STUDENT IN AN ORGANIZED HEALTH CARE EDUCATION/TRAINING PROGRAM

## 2023-06-03 PROCEDURE — 82330 ASSAY OF CALCIUM: CPT | Performed by: STUDENT IN AN ORGANIZED HEALTH CARE EDUCATION/TRAINING PROGRAM

## 2023-06-03 PROCEDURE — 25000003 PHARM REV CODE 250: Performed by: INTERNAL MEDICINE

## 2023-06-03 PROCEDURE — 84100 ASSAY OF PHOSPHORUS: CPT

## 2023-06-03 PROCEDURE — 83735 ASSAY OF MAGNESIUM: CPT | Performed by: STUDENT IN AN ORGANIZED HEALTH CARE EDUCATION/TRAINING PROGRAM

## 2023-06-03 PROCEDURE — 87522 HEPATITIS C REVRS TRNSCRPJ: CPT

## 2023-06-03 PROCEDURE — 37799 UNLISTED PX VASCULAR SURGERY: CPT

## 2023-06-03 RX ORDER — ATORVASTATIN CALCIUM 40 MG/1
80 TABLET, FILM COATED ORAL ONCE
Status: COMPLETED | OUTPATIENT
Start: 2023-06-03 | End: 2023-06-03

## 2023-06-03 RX ORDER — CALCIUM GLUCONATE 20 MG/ML
1 INJECTION, SOLUTION INTRAVENOUS
Status: COMPLETED | OUTPATIENT
Start: 2023-06-03 | End: 2023-06-03

## 2023-06-03 RX ORDER — CALCIUM GLUCONATE 20 MG/ML
2 INJECTION, SOLUTION INTRAVENOUS
Status: DISCONTINUED | OUTPATIENT
Start: 2023-06-03 | End: 2023-06-16 | Stop reason: HOSPADM

## 2023-06-03 RX ORDER — CALCIUM GLUCONATE 20 MG/ML
1 INJECTION, SOLUTION INTRAVENOUS
Status: DISCONTINUED | OUTPATIENT
Start: 2023-06-03 | End: 2023-06-16 | Stop reason: HOSPADM

## 2023-06-03 RX ORDER — CALCIUM GLUCONATE 20 MG/ML
3 INJECTION, SOLUTION INTRAVENOUS
Status: DISCONTINUED | OUTPATIENT
Start: 2023-06-03 | End: 2023-06-16 | Stop reason: HOSPADM

## 2023-06-03 RX ORDER — SODIUM CHLORIDE 9 MG/ML
INJECTION, SOLUTION INTRAVENOUS CONTINUOUS
Status: DISCONTINUED | OUTPATIENT
Start: 2023-06-03 | End: 2023-06-09

## 2023-06-03 RX ORDER — ASPIRIN 325 MG
325 TABLET ORAL ONCE
Status: COMPLETED | OUTPATIENT
Start: 2023-06-03 | End: 2023-06-03

## 2023-06-03 RX ORDER — CLOPIDOGREL BISULFATE 75 MG/1
75 TABLET ORAL DAILY
Status: CANCELLED | OUTPATIENT
Start: 2023-06-03

## 2023-06-03 RX ORDER — MAGNESIUM SULFATE HEPTAHYDRATE 40 MG/ML
2 INJECTION, SOLUTION INTRAVENOUS ONCE
Status: COMPLETED | OUTPATIENT
Start: 2023-06-03 | End: 2023-06-03

## 2023-06-03 RX ORDER — MORPHINE SULFATE 2 MG/ML
3 INJECTION, SOLUTION INTRAMUSCULAR; INTRAVENOUS EVERY 4 HOURS PRN
Status: DISCONTINUED | OUTPATIENT
Start: 2023-06-03 | End: 2023-06-03

## 2023-06-03 RX ORDER — FUROSEMIDE 10 MG/ML
80 INJECTION INTRAMUSCULAR; INTRAVENOUS ONCE
Status: COMPLETED | OUTPATIENT
Start: 2023-06-03 | End: 2023-06-03

## 2023-06-03 RX ORDER — MORPHINE SULFATE 2 MG/ML
2 INJECTION, SOLUTION INTRAMUSCULAR; INTRAVENOUS EVERY 4 HOURS PRN
Status: DISCONTINUED | OUTPATIENT
Start: 2023-06-03 | End: 2023-06-04

## 2023-06-03 RX ADMIN — ATORVASTATIN CALCIUM 80 MG: 40 TABLET, FILM COATED ORAL at 02:06

## 2023-06-03 RX ADMIN — LEVETIRACETAM 500 MG: 500 TABLET, FILM COATED ORAL at 08:06

## 2023-06-03 RX ADMIN — SODIUM CHLORIDE: 9 INJECTION, SOLUTION INTRAVENOUS at 02:06

## 2023-06-03 RX ADMIN — CALCIUM GLUCONATE 1 G: 20 INJECTION, SOLUTION INTRAVENOUS at 02:06

## 2023-06-03 RX ADMIN — MUPIROCIN: 20 OINTMENT TOPICAL at 08:06

## 2023-06-03 RX ADMIN — HYDROMORPHONE HYDROCHLORIDE 1 MG: 1 INJECTION, SOLUTION INTRAMUSCULAR; INTRAVENOUS; SUBCUTANEOUS at 03:06

## 2023-06-03 RX ADMIN — SODIUM CHLORIDE: 9 INJECTION, SOLUTION INTRAVENOUS at 07:06

## 2023-06-03 RX ADMIN — DEXMEDETOMIDINE HYDROCHLORIDE 1.2 MCG/KG/HR: 400 INJECTION INTRAVENOUS at 04:06

## 2023-06-03 RX ADMIN — FUROSEMIDE 80 MG: 10 INJECTION, SOLUTION INTRAMUSCULAR; INTRAVENOUS at 01:06

## 2023-06-03 RX ADMIN — HYDROMORPHONE HYDROCHLORIDE 1 MG: 1 INJECTION, SOLUTION INTRAMUSCULAR; INTRAVENOUS; SUBCUTANEOUS at 01:06

## 2023-06-03 RX ADMIN — MORPHINE SULFATE 2 MG: 2 INJECTION, SOLUTION INTRAMUSCULAR; INTRAVENOUS at 09:06

## 2023-06-03 RX ADMIN — HEPARIN SODIUM 15 UNITS/KG/HR: 10000 INJECTION, SOLUTION INTRAVENOUS at 06:06

## 2023-06-03 RX ADMIN — VANCOMYCIN HYDROCHLORIDE 1250 MG: 1 INJECTION, POWDER, LYOPHILIZED, FOR SOLUTION INTRAVENOUS at 05:06

## 2023-06-03 RX ADMIN — PIPERACILLIN AND TAZOBACTAM 4.5 G: 4; .5 INJECTION, POWDER, LYOPHILIZED, FOR SOLUTION INTRAVENOUS; PARENTERAL at 09:06

## 2023-06-03 RX ADMIN — SODIUM BICARBONATE: 84 INJECTION, SOLUTION INTRAVENOUS at 12:06

## 2023-06-03 RX ADMIN — MORPHINE SULFATE 2 MG: 2 INJECTION, SOLUTION INTRAMUSCULAR; INTRAVENOUS at 02:06

## 2023-06-03 RX ADMIN — SODIUM CHLORIDE: 9 INJECTION, SOLUTION INTRAVENOUS at 08:06

## 2023-06-03 RX ADMIN — DEXMEDETOMIDINE HYDROCHLORIDE 1.2 MCG/KG/HR: 400 INJECTION INTRAVENOUS at 08:06

## 2023-06-03 RX ADMIN — PIPERACILLIN AND TAZOBACTAM 4.5 G: 4; .5 INJECTION, POWDER, LYOPHILIZED, FOR SOLUTION INTRAVENOUS; PARENTERAL at 01:06

## 2023-06-03 RX ADMIN — DEXMEDETOMIDINE HYDROCHLORIDE 1 MCG/KG/HR: 400 INJECTION INTRAVENOUS at 02:06

## 2023-06-03 RX ADMIN — LIDOCAINE PATCH 5% 1 PATCH: 700 PATCH TOPICAL at 04:06

## 2023-06-03 RX ADMIN — ASPIRIN 325 MG ORAL TABLET 325 MG: 325 PILL ORAL at 02:06

## 2023-06-03 RX ADMIN — DEXMEDETOMIDINE HYDROCHLORIDE 1.2 MCG/KG/HR: 400 INJECTION INTRAVENOUS at 07:06

## 2023-06-03 RX ADMIN — NOREPINEPHRINE BITARTRATE 0.08 MCG/KG/MIN: 4 INJECTION, SOLUTION INTRAVENOUS at 10:06

## 2023-06-03 RX ADMIN — DEXMEDETOMIDINE HYDROCHLORIDE 1.2 MCG/KG/HR: 400 INJECTION INTRAVENOUS at 11:06

## 2023-06-03 RX ADMIN — DEXTROSE MONOHYDRATE 100 MG: 50 INJECTION, SOLUTION INTRAVENOUS at 07:06

## 2023-06-03 RX ADMIN — PIPERACILLIN AND TAZOBACTAM 4.5 G: 4; .5 INJECTION, POWDER, LYOPHILIZED, FOR SOLUTION INTRAVENOUS; PARENTERAL at 05:06

## 2023-06-03 RX ADMIN — DEXTROSE MONOHYDRATE 100 MG: 50 INJECTION, SOLUTION INTRAVENOUS at 08:06

## 2023-06-03 RX ADMIN — CALCIUM GLUCONATE 1 G: 20 INJECTION, SOLUTION INTRAVENOUS at 01:06

## 2023-06-03 RX ADMIN — MAGNESIUM SULFATE HEPTAHYDRATE 2 G: 40 INJECTION, SOLUTION INTRAVENOUS at 09:06

## 2023-06-03 RX ADMIN — HYDROMORPHONE HYDROCHLORIDE 1 MG: 1 INJECTION, SOLUTION INTRAMUSCULAR; INTRAVENOUS; SUBCUTANEOUS at 08:06

## 2023-06-03 NOTE — CONSULTS
[facility]  Nephrology Consultation  Patient Name: Ryan Wild  Age: 32 y.o.  : 1990  MRN: [id number]  Admission Date: 2023    Date of Consultation: 2023    Consultation Requested By: Dillon Dominguez MD    Reason for Consultation:  Acute kidney injury, multiple electrolyte abnormalities.    Chief Complaint: Back Pain, Hypotension, and Hearing Problem (PT REPORTS WAKING UP W LOWER BACK PAIN, WEAKNESS, SOB AND HEARING LOSS. 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  DENIES DRUG USE. HX OF SEIZURES, NON COMPLIANT W MEDS > 1 MONTH.  .  EKG OBTAINED. )      History of Present Illness:  Mr Ryan Wild is a 32 y.o. White male with past medical history of seizures, polysubstance abuse, treated hepatitis-C, and solitary kidney.  Patient presented to the emergency department on 2023 with complaints of back pain, shortness breath, and decreased urinary frequency.  Patient believes that symptoms were precipitated by the fact that he has been working outside in the heat for 12 hours.  There were no alleviating factors.  Patient denied chest pain, nausea, vomiting, dysuria, hematuria, abdominal pain, or fever.  He admitted to polysubstance abuse, including intravenous drug use.  UDS was positive for methamphetamines and fentanyl.  Patient also uses steroid injections.  In the emergency department, patient was tachycardic, hypotensive, and hypoxemic.  Laboratory results were remarkable for severe leukocytosis (WBC 41), azotemia, metabolic acidosis, hyperkalemia (serum potassium 6.9), transaminitis, hyperphosphatemia, elevated troponin, and elevated lactic acid level.  CK was 37,420 units per L.  Patient was admitted to the intensive care unit, placed on BiPAP, and emergently dialyzed last night for treatment of life-threatening hyperkalemia and metabolic acidosis.  Nephrology service was consulted for assistance  "with management of acute kidney injury and multiple electrolyte/acid-base abnormalities.    Patient has No Known Allergies.     Review of Systems   Constitutional:  Positive for activity change.   HENT: Negative.     Eyes: Negative.    Respiratory:  Positive for shortness of breath.    Cardiovascular: Negative.    Gastrointestinal: Negative.    Endocrine: Negative.    Genitourinary:  Positive for decreased urine volume.   Musculoskeletal:  Positive for myalgias.   Skin: Negative.    Allergic/Immunologic: Negative.    Neurological: Negative.    Hematological: Negative.    Psychiatric/Behavioral:  Positive for agitation.      Past Medical History:  has a past medical history of Depression, Opioid abuse, Seizures, Solitary kidney, congenital, and Unspecified viral hepatitis C without hepatic coma.    Procedure History:  has a past surgical history that includes Tonsillectomy.    Family History: family history includes Cerebral aneurysm in his father.    Social History:  reports that he has been smoking vaping with nicotine. He uses smokeless tobacco. He reports that he does not currently use alcohol. He reports that he does not currently use drugs after having used the following drugs: Heroin and Methamphetamines.    Physical Exam:   BP 97/70   Pulse 90   Temp 98 °F (36.7 °C) (Axillary)   Resp 20   Ht 5' 7" (1.702 m)   Wt 69.4 kg (153 lb)   SpO2 (!) 94%   BMI 23.96 kg/m²  Body mass index is 23.96 kg/m².  General appearance: Patient is in no acute distress. On NIPPV  HEENT: PERRLA, EOMI, no JVD. Neck is supple.  Right IJ dialysis catheter  Chest:  Mildly tachypneic.  Lung sounds are diminished to auscultation.    Heart: S1, S2.   Abdomen: Benign.  :  Umaña catheter to gravity with dark yellow urine in the collection chamber.  Extremities: No edema, peripheral pulses are palpable.   Neuro: No focal deficits.     Inpatient Medications:     Current Facility-Administered Medications:     dexmedetomidine (PRECEDEX) " 400mcg/100mL 0.9% NaCL infusion, 0-1.4 mcg/kg/hr, Intravenous, Continuous, Dillon Dominguez MD, Last Rate: 20.8 mL/hr at 06/03/23 0522, 1.2 mcg/kg/hr at 06/03/23 0522    dextrose 10% bolus 125 mL 125 mL, 12.5 g, Intravenous, PRN, Alda Valladares MD, Stopped at 06/02/23 2234    dextrose 40 % gel 15,000 mg, 15 g, Oral, PRN, Alda Valladares MD    dextrose 40 % gel 30,000 mg, 30 g, Oral, PRN, Alda Valladares MD    doxycycline (VIBRAMYCIN) 100 mg in dextrose 5 % (D5W) 250 mL IVPB, 100 mg, Intravenous, Q12H, Dominic Armijo MD, Stopped at 06/02/23 2033    glucagon (human recombinant) injection 1 mg, 1 mg, Intramuscular, PRN, Alda Valladares MD    heparin 25,000 units in dextrose 5% (100 units/ml) IV bolus from bag - ADDITIONAL PRN BOLUS - 30 units/kg (max bolus 4000 units), 30 Units/kg (Adjusted), Intravenous, PRN, Dillon Dominguez MD    heparin 25,000 units in dextrose 5% (100 units/ml) IV bolus from bag - ADDITIONAL PRN BOLUS - 60 units/kg (max bolus 4000 units), 59.3 Units/kg (Adjusted), Intravenous, PRN, Dillon Dominguez MD    heparin 25,000 units in dextrose 5% 250 mL (100 units/mL) infusion LOW INTENSITY nomogram - LAF, 0-40 Units/kg/hr (Adjusted), Intravenous, Continuous, Dillon Dominguez MD, Last Rate: 8.1 mL/hr at 06/03/23 0522, 12 Units/kg/hr at 06/03/23 0522    HYDROmorphone injection 1 mg, 1 mg, Intravenous, Q6H PRN, Dominic Armijo MD, 1 mg at 06/03/23 0121    insulin regular injection 6.94 Units 0.0694 mL, 0.1 Units/kg, Intravenous, PRN, Dominic Armijo MD, 6.94 Units at 06/02/23 2112    levETIRAcetam tablet 500 mg, 500 mg, Oral, BID, Alda Valladares MD, 500 mg at 06/02/23 2034    LIDOcaine 5 % patch 1 patch, 1 patch, Transdermal, Q24H, Dillon Dominguez MD, 1 patch at 06/02/23 1600    mupirocin 2 % ointment, , Nasal, BID, Dewayne Torres MD, Given at 06/02/23 2034    naloxone 0.4 mg/mL injection 0.02 mg, 0.02 mg, Intravenous, PRN, Alda Valladares MD    NORepinephrine 4 mg in dextrose 5% 250 mL infusion (premix) (titrating), 0-3 mcg/kg/min,  Intravenous, Continuous, Juan Manuel Miranda MD, Last Rate: 20.8 mL/hr at 06/03/23 0522, 0.08 mcg/kg/min at 06/03/23 0522    piperacillin-tazobactam (ZOSYN) 4.5 g in sodium chloride 0.9% 100 mL IVPB (MB+), 4.5 g, Intravenous, Q8H, Alda Valladares MD, Last Rate: 25 mL/hr at 06/03/23 0522, Rate Verify at 06/03/23 0522    sodium bicarbonate 150 mEq in dextrose 5 % (D5W) 1,150 mL infusion, , Intravenous, Continuous, Dominic Armijo MD, Last Rate: 150 mL/hr at 06/03/23 0522, Rate Verify at 06/03/23 0522    sodium chloride 0.9% flush 10 mL, 10 mL, Intravenous, Q12H PRN, Alda Valladares MD    sodium zirconium cyclosilicate packet 10 g, 10 g, Oral, TID, Dillon Dominguez MD, 10 g at 06/02/23 2033    Pharmacy to dose Vancomycin consult, , , Once **AND** vancomycin - pharmacy to dose, , Intravenous, pharmacy to manage frequency, Alda Valladares MD    [START ON 6/4/2023] vancomycin 750 mg in sodium chloride 0.9% 250 mL IVPB, 750 mg, Intravenous, Q24H, Alda Valladares MD     Imaging:  Kidney ultrasound 06/02/2023: The right kidney is not visualized, with no focal or otherwise suspicious findings of the ipsilateral retroperitoneal region.  The left kidney measures 14.1 cm x 7.2 cm x 6.3 cm.  The left renal parenchyma is homogeneous and normal by sonographic appearance, with smooth marginated cortex.  No masses or complex cysts are appreciated.  No collecting system dilatation.  No shadowing calcification is identified.  No perinephric collection or free abdominal fluid.  Miscellaneous: There is incidentally appreciated fluid partially visualized through the left abdominal cavity.  Scattered internal punctate echogenic foci may represent associated debris.  IMPRESSION  1. Solitary left kidney, without sonographic findings of acute urologic abnormality.  2. Partially visualized fluid with echogenic debris at the left hemiabdomen is nonspecific; differential includes complex ascites or collection, versus distended stomach/bowel.    CT CHEST  ABDOMEN PELVIS WITHOUT CONTRAST 6/2/2023:  Lung and large airways: Multifocal consolidation in both lungs, greatest in the lower lobes.  Pleura: No significant pleural fluid.   Mediastinum and nae: Normal heart size.  No pathologically enlarged lymph node identified.  Chest wall/axilla and lower neck: No significant findings.   Liver/biliary: Mild hepatomegaly.  Suspected gallbladder wall thickening.  No biliary dilatation.   Pancreas: Normal.  Spleen: Normal.   Adrenals: Normal.   Genitourinary: Right kidney not identified.  No urinary stone or hydronephrosis on the left.  Normal bladder.   Stomach/bowel: No bowel obstruction.  Abdominal/pelvic lymph nodes and peritoneum: No pathologically enlarged lymph node identified. Small volume ascites with increased fluid density in portions of the pelvis.  Abdominal and pelvic vasculature: Normal abdominal aortic caliber.  Abdominal wall: High-riding right testicle along the right inguinal canal.  Musculoskeletal: No acute osseous findings.     Impression:  1. Multifocal lung consolidation, likely pneumonia.  2. Small volume ascites with nonspecific gallbladder wall thickening.  Some of the ascitic fluid in the pelvis appears complex.  3. Mild hepatomegaly.    Laboratory Data:   Serum  Lab Results   Component Value Date    WBC 36.27 (H) 06/03/2023    HGB 13.1 (L) 06/03/2023    HCT 39.8 (L) 06/03/2023     06/03/2023    FERRITIN 153.40 09/14/2022    LDH 2,294 (H) 06/02/2023     (L) 06/03/2023    K 5.2 (H) 06/03/2023    CHLORIDE 98 06/03/2023    CO2 24 06/03/2023    BUN 29.0 (H) 06/03/2023    CREATININE 2.72 (H) 06/03/2023    EGFRNORACEVR 31 06/03/2023    GLUCOSE 109 (H) 06/03/2023    CALCIUM 7.0 (L) 06/03/2023    ALKPHOS 39 (L) 06/03/2023    LABPROT 5.1 (L) 06/03/2023    ALBUMIN 2.8 (L) 06/03/2023     (H) 06/03/2023     (H) 06/03/2023    MG 1.60 06/03/2023    PHOS 5.6 (H) 06/03/2023      Lab Results   Component Value Date    HGBA1C 4.8 05/10/2022     HIV Nonreactive 09/14/2022    HEPBSURFAG Nonreactive 06/02/2023    HEPBSAB Nonreactive 09/14/2022    HEPBCAB Nonreactive 09/14/2022     Urine:  Lab Results   Component Value Date    COLORUA Yellow 06/02/2023    APPEARANCEUA Turbid (A) 06/02/2023    SGUA 1.016 06/02/2023    PHUA 5.5 06/02/2023    PROTEINUA 2+ (A) 06/02/2023    GLUCOSEUA Normal 06/02/2023    KETONESUA Trace (A) 06/02/2023    BLOODUA 3+ (A) 06/02/2023    NITRITESUA Negative 06/02/2023    LEUKOCYTESUR Negative 06/02/2023    RBCUA 11-20 (A) 06/02/2023    WBCUA 21-50 (A) 06/02/2023    BACTERIA Occ (A) 06/02/2023    SQEPUA Trace (A) 06/02/2023    HYALINECASTS None Seen 06/02/2023    CREATRANDUR 80.6 06/02/2023    PROTEINURINE 141.1 06/02/2023    UPROTCREA 1.8 06/02/2023      Microbiology Results (last 7 days)       Procedure Component Value Units Date/Time    Urine culture [[id number]] Collected: 06/02/23 1900    Order Status: Sent Specimen: Urine, Catheterized Updated: 06/02/23 2017    Blood culture x two cultures. Draw prior to antibiotics. [[id number]] Collected: 06/02/23 1436    Order Status: Resulted Specimen: Blood from Hand, Left Updated: 06/02/23 1437    Blood culture x two cultures. Draw prior to antibiotics. 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  Will plan to dialyze again today to correct hyponatremia, hyperkalemia, hyperphosphatemia, and for the purposes of myoglobin clearance from the plasma.  Will change IVF to NS at 150 ml/hr.     Thank you very much for your  consultation.     La Figueroa NP  [facility] Nephrology  6/3/2023 6:05 AM

## 2023-06-03 NOTE — CARE UPDATE
Patient has responded to increased BiPAP settings.  Patient's mentation is intact and he is able to follow commands.  Patient is oliguric.  Chest x-ray is consistent with bilateral opacities likely secondary to infectious process versus acute respiratory distress syndrome.  Will trial a dose of Lasix IV and monitor closely patient's respiratory status.    Lm Yanes MD  Internal Medicine Resident PGY-II

## 2023-06-03 NOTE — PROCEDURES
"Ryan Wild is a 32 y.o. male patient.    Temp: 97.8 °F (36.6 °C) (06/02/23 2035)  Pulse: 105 (06/02/23 2159)  Resp: 18 (06/02/23 2159)  BP: 119/68 (06/02/23 2035)  SpO2: 95 % (06/02/23 2159)  Weight: 69.4 kg (153 lb) (06/02/23 1310)  Height: 5' 7" (170.2 cm) (06/02/23 1310)       Central Line    Date/Time: 6/2/2023 10:18 PM  Performed by: Esme Harley MD  Authorized by: Esme Harley MD     Location procedure was performed:  [facility] ACCESS  Consent Done ?:  Yes  Time out complete?: Verified correct patient, procedure, equipment, staff, and site/side    Indications:  Hemodialysis and med administration  Anesthesia:  Local infiltration  Local anesthetic:  Lidocaine 1% without epinephrine  Preparation:  Skin prepped with ChloraPrep  Skin prep agent dried: Skin prep agent completely dried prior to procedure    Sterile barriers: All five maximal sterile barriers used - gloves, gown, cap, mask and large sterile sheet    Hand hygiene: Hand hygiene performed immediately prior to central venous catheter insertion    Location:  Right internal jugular  Catheter type:  Triple lumen  Catheter size:  12 Fr  Inserted Catheter Length (cm):  16  Ultrasound guidance: Yes    Vessel Caliber:  Large   patent  Comprressibility:  Normal  Doppler:  Not done  Needle advanced into vessel with real time ultrasound guidance.    Guidewire confirmed in vessel.    Steril sheath on probe.    Sterile gel used.  Manometry: No    Number of attempts:  1  Securement:  Line sutured and sterile dressing applied  Complications: No    Estimated blood loss (mL):  5  XRay:  Placement verified by x-ray  Adverse Events:  NoneTermination Site: superior vena cava    Esme Harley MD   [facility] General Surgery, PGY-1  6/2/2023    "

## 2023-06-03 NOTE — PROGRESS NOTES
06/03/23 1202        Hemodialysis Catheter 06/02/23 2200 right internal jugular   Placement Date/Time: 06/02/23 2200   Present Prior to Hospital Arrival?: No  Hand Hygiene: Performed  Barrier Precautions: Performed  Skin Antisepsis: ChloraPrep  Location: right internal jugular  Insertion attempts (enter comment if more than 2 attem...   Site Assessment No drainage;No redness;No swelling;No warmth   Line Securement Device Secured with sutures   Dressing Type CHG impregnated dressing/sponge;Transparent (Tegaderm)   Dressing Status Clean;Dry;Intact   Dressing Intervention Integrity maintained   Date on Dressing 06/03/23   Dressing Due to be Changed 06/07/23   Post-Hemodialysis Assessment   Blood Volume Processed (Liters) 53.4 L   Dialyzer Clearance Moderately streaked   Duration of Treatment 180 minutes   Additional Fluid Intake (mL) 500 mL   Total UF (mL) 500 mL   Net Fluid Removal 0   Patient Response to Treatment pt tolerated well   Post-Hemodialysis Comments tx complete, pt reinfused, cvc deaccessed per p&p, new sterile caps applied

## 2023-06-03 NOTE — CONSULTS
[facility]   Consult Note  General Surgery    Patient Name: Ryan Wild  YOB: 1990  Date of Admission: 6/2/2023  Date: 06/02/2023 10:18 PM  Reason for Consult: Emergent HD line placement      PRESENTING HISTORY     Chief Complaint/Reason for Admission: Non-traumatic rhabdomyolysis    History of Present Illness:  32M w/PMHx of polysubstance abuse who was brought in to the ER today by his coworkers for malaise lasting 12 hours. 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 He feels weak and is complaining of excruciating back pain. Patient reports hx of methamphetamine use 2 days ago. On arrival to the ED, labs were notable for wbc 41, K of 6.9, trop of 3.66 and CPK of 37,000 and lactate of 4.8. Surgery consulted for emergent HD cath placement for HD tonight.    Review of Systems:  12 point ROS negative except as stated in HPI    PAST HISTORY:   Past medical history:  Past Medical History:   Diagnosis Date    Depression     Opioid abuse     Seizures     Solitary kidney, congenital     Unspecified viral hepatitis C without hepatic coma        Past surgical history:  Past Surgical History:   Procedure Laterality Date    TONSILLECTOMY         Family history:  Family History   Problem Relation Age of Onset    Cerebral aneurysm Father        Social history:  Social History     Socioeconomic History    Marital status: Single   Tobacco Use    Smoking status: Every Day     Types: Vaping with nicotine    Smokeless tobacco: Current   Substance and Sexual Activity    Alcohol use: Not Currently    Drug use: Not Currently     Types: Heroin, Methamphetamines     Comment: 3 years sober    Sexual activity: Yes     Partners: Female     Social History     Tobacco Use   Smoking Status Every Day    Types: Vaping with nicotine   Smokeless Tobacco Current         MEDICATIONS & ALLERGIES:   Allergies: Review of patient's allergies indicates:  No Known Allergies    No  current facility-administered medications on file prior to encounter.     Current Outpatient Medications on File Prior to Encounter   Medication Sig    albuterol (PROVENTIL/VENTOLIN HFA) 90 mcg/actuation inhaler INHALE 2 PUFFS BY MOUTH EVERY 6 HOURS AS NEEDED FOR SHORTNESS OF BREATH OR WHEEZING    fluticasone propionate (FLONASE) 50 mcg/actuation nasal spray SHAKE LIQUID AND USE 2 SPRAYS IN EACH NOSTRIL DAILY    levETIRAcetam (KEPPRA) 500 MG Tab Take 1 tablet by mouth 2 (two) times daily.    QUEtiapine (SEROQUEL) 100 MG Tab Take 1 tablet (100 mg total) by mouth nightly.    valACYclovir (VALTREX) 1000 MG tablet Take 1,000 mg by mouth 3 (three) times daily.    venlafaxine (EFFEXOR-XR) 37.5 MG 24 hr capsule Take 37.5 mg by mouth every morning.       Scheduled Meds:   calcium gluconate IVPB  1 g Intravenous Once    doxycycline (VIBRAMYCIN) IVPB  100 mg Intravenous Q12H    heparin (PORCINE)  59.3 Units/kg (Adjusted) Intravenous Once    levETIRAcetam  500 mg Oral BID    LIDOcaine  1 patch Transdermal Q24H    mupirocin   Nasal BID    piperacillin-tazobactam (ZOSYN) IVPB  4.5 g Intravenous Q8H    sodium zirconium cyclosilicate  10 g Oral TID    [START ON 6/4/2023] vancomycin (VANCOCIN) IVPB  750 mg Intravenous Q24H       Continuous Infusions:   dexmedeTOMIDine (Precedex) infusion (titrating) 0.4 mcg/kg/hr (06/02/23 2141)    heparin (porcine) in D5W      NORepinephrine bitartrate-D5W Stopped (06/02/23 2035)    sodium bicarbonate drip 150 mL/hr at 06/02/23 2141       PRN Meds:dextrose 10%, dextrose, dextrose, glucagon (human recombinant), heparin (PORCINE), heparin (PORCINE), HYDROmorphone, insulin regular, naloxone, sodium chloride 0.9%, Pharmacy to dose Vancomycin consult **AND** vancomycin - pharmacy to dose    OBJECTIVE:     Vital Signs:  Temp:  [96.8 °F (36 °C)-97.8 °F (36.6 °C)] 97.8 °F (36.6 °C)  Pulse:  [] 105  Resp:  [12-36] 18  SpO2:  [82 %-100 %] 95 %  BP: ()/(49-92) 119/68  Arterial Line BP:  ()/(65-86) 112/69  Body mass index is 23.96 kg/m².     No intake/output data recorded.  I/O this shift:  In: 7725.9 [I.V.:1204.6; IV Piggyback:6521.3]  Out: 300 [Urine:300]    Physical Exam:  Patient resting in bed, appears uncomfortable  On BIPAP sating low 90%  Tachycardic rate   Abdomen soft  Moving all extremities     Laboratory:  Recent Labs     06/02/23 1334 06/02/23 1419 06/02/23 1919   WBC 42.10*  --  41.45*   HGB 15.1  --  14.6   HCT 50.1  --  46.0   *  --  405*   PTT  --  27.1  --    INR  --  1.35*  --      Recent Labs     06/02/23 1334 06/02/23 1919    133*   K 6.9* 6.9*   CO2 14* 16*   BUN 33.1* 36.0*   CREATININE 3.77* 3.23*   CALCIUM 7.5* 6.3*   MG 2.90*  --    PHOS 17.3*  --    ALBUMIN 3.9 3.1*   BILITOT 0.2 0.3   * 789*   ALKPHOS 135 55   * 520*     Troponin:  Recent Labs     06/02/23 1334 06/02/23 1919   TROPONINI 1.878* 3.665*     CBC:  Recent Labs     06/02/23 1334 06/02/23 1919   WBC 42.10* 41.45*   RBC 5.08 4.95   HGB 15.1 14.6   HCT 50.1 46.0   * 405*   MCV 98.6* 92.9   MCH 29.7 29.5   MCHC 30.1* 31.7*     CMP:  Recent Labs     06/02/23 1334 06/02/23 1919   CALCIUM 7.5* 6.3*   ALBUMIN 3.9 3.1*    133*   K 6.9* 6.9*   CO2 14* 16*   BUN 33.1* 36.0*   CREATININE 3.77* 3.23*   ALKPHOS 135 55   * 520*   * 789*   BILITOT 0.2 0.3     Lactic Acid:  No results for input(s): LACTATE in the last 72 hours.  Etoh:  No results for input(s): ALCOHOLMEDIC in the last 72 hours.  Drug Screen:  Recent Labs     06/02/23  1901   CREATRANDUR 80.6       ABG:  No results for input(s): PH, PCO2, PO2, HCO3, BE, POCSATURATED in the last 72 hours.    Diagnostic Results:  X-Ray Chest AP Portable    Result Date: 6/2/2023  Findings concerning for right mid to lower lung infectious process. Electronically signed by: Franco Gonzáles Date:    06/02/2023 Time:    13:48    CT Chest Abdomen Pelvis Without Contrast (XPD)    Result Date: 6/2/2023  1. Multifocal  lung consolidation, likely pneumonia. 2. Small volume ascites with nonspecific gallbladder wall thickening.  Some of the ascitic fluid in the pelvis appears complex. 3. Mild hepatomegaly. Electronically signed by: Arley Cuevas Date:    06/02/2023 Time:    18:40    CT Chest Abdomen Pelvis Without Contrast (XPD)   Final Result      1. Multifocal lung consolidation, likely pneumonia.   2. Small volume ascites with nonspecific gallbladder wall thickening.  Some of the ascitic fluid in the pelvis appears complex.   3. Mild hepatomegaly.         Electronically signed by: Arley Cuevas   Date:    06/02/2023   Time:    18:40      US Retroperitoneal Limited   Final Result      X-Ray Chest AP Portable   Final Result      Findings concerning for right mid to lower lung infectious process.         Electronically signed by: Franco Gonzáles   Date:    06/02/2023   Time:    13:48      X-Ray Chest 1 View    (Results Pending)   X-Ray Chest 1 View    (Results Pending)       Microbiology:  Microbiology Results (last 7 days)       Procedure Component Value Units Date/Time    Urine culture [[id number]] Collected: 06/02/23 1900    Order Status: Sent Specimen: Urine, Catheterized Updated: 06/02/23 2017    Blood culture x two cultures. Draw prior to antibiotics. [[id number]] Collected: 06/02/23 1436    Order Status: Resulted Specimen: Blood from Hand, Left Updated: 06/02/23 1437    Blood culture x two cultures. Draw prior to antibiotics. [[id number]] Collected: 06/02/23 1419    Order Status: Resulted Specimen: Blood from Arm, Left Updated: 06/02/23 1435             ASSESSMENT & PLAN:   32M w/PMHx of polysubstance abuse admitted to the ICU w/rhabdomyolysis. Surgery consulted for emergent HD cath placement.    Plan:  - Written consent obtained  - RIJ HD cath placed using ultrasound guidance  - Positioning confirmed on CXR  - Okay to use catheter    Whitley Rushing MD   [employer] General Surgery PGY 3  6/2/2023  10:18 PM

## 2023-06-03 NOTE — NURSING
06/03/23 0105   Post-Hemodialysis Assessment   Blood Volume Processed (Liters) 33.9 L   Dialyzer Clearance Clear   Duration of Treatment 120 minutes   Total UF (mL) 500 mL   Patient Response to Treatment Pt tolerated emergent HD tx well; NAD noted/ VSS throuhout. 1k bath utilized for first HD tx hour; then 2k bath for remainder. Total tx length 2hrs with 500ml net UF goal per N.P. orders.   Post-Hemodialysis Comments Pt deaccessed per P and P

## 2023-06-03 NOTE — PLAN OF CARE
Problem: Fluid and Electrolyte Imbalance (Acute Kidney Injury/Impairment)  Goal: Fluid and Electrolyte Balance  Outcome: Ongoing, Progressing  Intervention: Monitor and Manage Fluid and Electrolyte Balance  Flowsheets (Taken 6/3/2023 1610)  Fluid/Electrolyte Management: intravenous fluids adjusted     Problem: Device-Related Complication Risk (Hemodialysis)  Goal: Safe, Effective Therapy Delivery  Outcome: Ongoing, Progressing  Intervention: Optimize Device Care and Function  Flowsheets (Taken 6/3/2023 1610)  Circuit Management: air detection alarms on  Medication Review/Management:   high-risk medications identified   medications reviewed     Problem: Hemodynamic Instability (Hemodialysis)  Goal: Effective Tissue Perfusion  Outcome: Ongoing, Progressing  Intervention: Optimize Blood Flow  Flowsheets (Taken 6/3/2023 1610)  Stabilization Measures:   airway opened   verbal stimulation provided   fluid resuscitation initiated

## 2023-06-03 NOTE — PLAN OF CARE
06/03/23 0806   Discharge Assessment   Assessment Type Discharge Planning Assessment   Confirmed/corrected address, phone number and insurance Yes   Confirmed Demographics Correct on Facesheet   Source of Information health record;other (see comments)   When was your last doctors appointment?   (Adelaida Dillon)   Reason For Admission Hyperkalemia, Shock, Metabolic acidosis, Weakness, NSTEMI (non-ST elevated myocardial infarction), Troponin I above reference range, Chest pain, Hypotension, Septic shock, Non-traumatic rhabdomyolysis, Acute renal failure, unspecified acute renal failure type, Community acquired pneumonia of right lung, unspecified part of lung, Sepsis, due to unspecified organism, unspecified whether acute organ dysfunction present   People in Home friend(s)   Facility Arrived From: Home   Do you expect to return to your current living situation? Yes   Do you have help at home or someone to help you manage your care at home? Yes   Who are your caregiver(s) and their phone number(s)? Frd, Aggie Keita ([phone])   Prior to hospitilization cognitive status: Alert/Oriented   Current cognitive status: Alert/Oriented   Equipment Currently Used at Home none   Readmission within 30 days? No   Patient currently being followed by outpatient case management? No   Do you currently have service(s) that help you manage your care at home? No   Do you take prescription medications? Yes  (Shant Arroyo & Caitlin)   Do you have prescription coverage? Yes   Coverage [facility] M/D   Do you have any problems affording any of your prescribed medications? No   Is the patient taking medications as prescribed? no   If no, which medications is patient not taking? Keisha   Who is going to help you get home at discharge? Zoey   How do you get to doctors appointments? car, drives self   Are you on dialysis? No   Discharge Plan A Home   DME Needed Upon Discharge  none   Discharge Plan discussed with: Spouse/sig  other   Name(s) and Number(s) Ugo Aggie Keita ([phone])   Transition of Care Barriers Substance Abuse   Physical Activity   On average, how many days per week do you engage in moderate to strenuous exercise (like a brisk walk)? 5 days   On average, how many minutes do you engage in exercise at this level? 120 min   Housing Stability   In the last 12 months, was there a time when you were not able to pay the mortgage or rent on time? N   In the last 12 months, how many places have you lived? 1   In the last 12 months, was there a time when you did not have a steady place to sleep or slept in a shelter (including now)? N   Transportation Needs   In the past 12 months, has lack of transportation kept you from medical appointments or from getting medications? no   Food Insecurity   Within the past 12 months, you worried that your food would run out before you got the money to buy more. Never true   Within the past 12 months, the food you bought just didn't last and you didn't have money to get more. Never true   Stress   Do you feel stress - tense, restless, nervous, or anxious, or unable to sleep at night because your mind is troubled all the time - these days? Not at all   Social Connections   In a typical week, how many times do you talk on the phone with family, friends, or neighbors? More than 3   How often do you get together with friends or relatives? More than 3   How often do you attend Mormonism or Pentecostalism services? Never   Do you belong to any clubs or organizations such as Mormonism groups, unions, fraternal or athletic groups, or school groups? No   How often do you attend meetings of the clubs or organizations you belong to? Never   Are you , , , , never , or living with a partner?    Alcohol Use   Q1: How often do you have a drink containing alcohol? 4 or more ti   Q2: How many drinks containing alcohol do you have on a typical day when you are  drinking? 3 or 4   Q3: How often do you have six or more drinks on one occasion? Never   OTHER   Name(s) of People in Home Resides with roommates     Hx obtained form fiance/emergency contact, Aggie Keita, who reported pt has hx of incarceration & substance abuse Tx; Pt currently enrolled in IOP program at [facility]; Last IP stay at [place] for Substance Abuse; Pt has 4 sons; Employed in construction field; Independent with ADL's.

## 2023-06-04 LAB
A-ADO2 BLOOD GAS (OHS): 590 MMHG
A-ADO2 BLOOD GAS (OHS): 602 MMHG
ALBUMIN SERPL-MCNC: 2.5 G/DL (ref 3.5–5)
ALBUMIN/GLOB SERPL: 1 RATIO (ref 1.1–2)
ALLENS TEST BLOOD GAS (OHS): ABNORMAL
ALLENS TEST BLOOD GAS (OHS): ABNORMAL
ALP SERPL-CCNC: 34 UNIT/L (ref 40–150)
ALT SERPL-CCNC: 446 UNIT/L (ref 0–55)
APTT PPP: 42.7 SECONDS
APTT PPP: 48.6 SECONDS
APTT PPP: 58.8 SECONDS
APTT PPP: 65.1 SECONDS
AST SERPL-CCNC: 797 UNIT/L (ref 5–34)
BASE EXCESS BLD CALC-SCNC: -0.5 MMOL/L (ref -2–2)
BASE EXCESS BLD CALC-SCNC: 0 MMOL/L (ref -2–2)
BASOPHILS # BLD AUTO: 0.02 X10(3)/MCL
BASOPHILS NFR BLD AUTO: 0.1 %
BILIRUBIN DIRECT+TOT PNL SERPL-MCNC: 0.4 MG/DL
BIPAP(E) BLOOD GAS (OHS): 12 CM H2O
BIPAP(I) BLOOD GAS (OHS): 18 CM H2O
BLOOD GAS SAMPLE TYPE (OHS): ABNORMAL
BLOOD GAS SAMPLE TYPE (OHS): ABNORMAL
BUN SERPL-MCNC: 34.3 MG/DL (ref 8.9–20.6)
CALCIUM SERPL-MCNC: 7 MG/DL (ref 8.4–10.2)
CHLORIDE SERPL-SCNC: 101 MMOL/L (ref 98–107)
CK SERPL-CCNC: ABNORMAL U/L (ref 30–200)
CO2 BLDA-SCNC: 25.4 MMOL/L (ref 22–26)
CO2 BLDA-SCNC: 26 MMOL/L (ref 22–26)
CO2 SERPL-SCNC: 23 MMOL/L (ref 22–29)
COHGB MFR BLDA: 1.6 % (ref 0.5–1.5)
COHGB MFR BLDA: 1.8 % (ref 0.5–1.5)
CREAT SERPL-MCNC: 3.59 MG/DL (ref 0.73–1.18)
DRAWN BY BLOOD GAS (OHS): ABNORMAL
DRAWN BY BLOOD GAS (OHS): ABNORMAL
EOSINOPHIL # BLD AUTO: 0.01 X10(3)/MCL (ref 0–0.9)
EOSINOPHIL NFR BLD AUTO: 0 %
ERYTHROCYTE [DISTWIDTH] IN BLOOD BY AUTOMATED COUNT: 12.9 % (ref 11.5–17)
FIO2 BLOOD GAS (OHS): 100 %
FIO2 BLOOD GAS (OHS): 100 %
GFR SERPLBLD CREATININE-BSD FMLA CKD-EPI: 22 MLS/MIN/1.73/M2
GLOBULIN SER-MCNC: 2.6 GM/DL (ref 2.4–3.5)
GLUCOSE SERPL-MCNC: 80 MG/DL (ref 74–100)
HCO3 BLDA-SCNC: 24.2 MMOL/L (ref 22–26)
HCO3 BLDA-SCNC: 24.8 MMOL/L (ref 22–26)
HCT VFR BLD AUTO: 34.8 % (ref 42–52)
HGB BLD-MCNC: 11.3 G/DL (ref 14–18)
IMM GRANULOCYTES # BLD AUTO: 0.14 X10(3)/MCL (ref 0–0.04)
IMM GRANULOCYTES NFR BLD AUTO: 0.7 %
LPM (OHS): 40
LYMPHOCYTES # BLD AUTO: 1.43 X10(3)/MCL (ref 0.6–4.6)
LYMPHOCYTES NFR BLD AUTO: 7.1 %
MAGNESIUM SERPL-MCNC: 2.2 MG/DL (ref 1.6–2.6)
MCH RBC QN AUTO: 29.2 PG (ref 27–31)
MCHC RBC AUTO-ENTMCNC: 32.5 G/DL (ref 33–36)
MCV RBC AUTO: 89.9 FL (ref 80–94)
METHGB MFR BLDA: 0.2 % (ref 0–1.5)
METHGB MFR BLDA: 0.9 % (ref 0–1.5)
MODE (OHS): ABNORMAL
MONOCYTES # BLD AUTO: 1.44 X10(3)/MCL (ref 0.1–1.3)
MONOCYTES NFR BLD AUTO: 7.1 %
MRSA PCR SCRN (OHS): NOT DETECTED
NEUTROPHILS # BLD AUTO: 17.2 X10(3)/MCL (ref 2.1–9.2)
NEUTROPHILS NFR BLD AUTO: 85 %
NRBC BLD AUTO-RTO: 0 %
O2 HB BLOOD GAS (OHS): 91.7 % (ref 94–100)
O2 HB BLOOD GAS (OHS): 94.4 % (ref 94–100)
OXYGEN DEVICE BLOOD GAS (OHS): ABNORMAL
OXYHGB MFR BLDA: 11.3 G/DL (ref 12–18)
OXYHGB MFR BLDA: 12 G/DL (ref 12–18)
PAO2 BLOOD GAS (OHS): 663 MMHG
PAO2 BLOOD GAS (OHS): 664 MMHG
PCO2 BLDA: 39 MMHG (ref 35–45)
PCO2 BLDA: 40 MMHG (ref 35–45)
PH BLDA: 7.4 [PH] (ref 7.35–7.45)
PH BLDA: 7.4 [PH] (ref 7.35–7.45)
PHOSPHATE SERPL-MCNC: 4.2 MG/DL (ref 2.3–4.7)
PLATELET # BLD AUTO: 189 X10(3)/MCL (ref 130–400)
PMV BLD AUTO: 9.7 FL (ref 7.4–10.4)
PO2 BLDA: 61 MMHG (ref 75–100)
PO2 BLDA: 74 MMHG (ref 75–100)
POTASSIUM SERPL-SCNC: 4.7 MMOL/L (ref 3.5–5.1)
PROT SERPL-MCNC: 5.1 GM/DL (ref 6.4–8.3)
RBC # BLD AUTO: 3.87 X10(6)/MCL (ref 4.7–6.1)
SAMPLE SITE BLOOD GAS (OHS): ABNORMAL
SAMPLE SITE BLOOD GAS (OHS): ABNORMAL
SAO2 % BLDA: 93.7 %
SAO2 % BLDA: 96.9 %
SODIUM SERPL-SCNC: 134 MMOL/L (ref 136–145)
VANCOMYCIN SERPL-MCNC: 21.2 UG/ML (ref 15–20)
WBC # SPEC AUTO: 20.24 X10(3)/MCL (ref 4.5–11.5)

## 2023-06-04 PROCEDURE — 63600175 PHARM REV CODE 636 W HCPCS: Performed by: STUDENT IN AN ORGANIZED HEALTH CARE EDUCATION/TRAINING PROGRAM

## 2023-06-04 PROCEDURE — 85730 THROMBOPLASTIN TIME PARTIAL: CPT | Performed by: STUDENT IN AN ORGANIZED HEALTH CARE EDUCATION/TRAINING PROGRAM

## 2023-06-04 PROCEDURE — 63600175 PHARM REV CODE 636 W HCPCS: Performed by: FAMILY MEDICINE

## 2023-06-04 PROCEDURE — 25000003 PHARM REV CODE 250: Performed by: FAMILY MEDICINE

## 2023-06-04 PROCEDURE — 83735 ASSAY OF MAGNESIUM: CPT

## 2023-06-04 PROCEDURE — 25000003 PHARM REV CODE 250: Performed by: STUDENT IN AN ORGANIZED HEALTH CARE EDUCATION/TRAINING PROGRAM

## 2023-06-04 PROCEDURE — 84100 ASSAY OF PHOSPHORUS: CPT

## 2023-06-04 PROCEDURE — 63600175 PHARM REV CODE 636 W HCPCS: Performed by: INTERNAL MEDICINE

## 2023-06-04 PROCEDURE — 87641 MR-STAPH DNA AMP PROBE: CPT | Performed by: FAMILY MEDICINE

## 2023-06-04 PROCEDURE — 85025 COMPLETE CBC W/AUTO DIFF WBC: CPT | Performed by: STUDENT IN AN ORGANIZED HEALTH CARE EDUCATION/TRAINING PROGRAM

## 2023-06-04 PROCEDURE — 94761 N-INVAS EAR/PLS OXIMETRY MLT: CPT

## 2023-06-04 PROCEDURE — 63600175 PHARM REV CODE 636 W HCPCS

## 2023-06-04 PROCEDURE — 94660 CPAP INITIATION&MGMT: CPT

## 2023-06-04 PROCEDURE — 25000003 PHARM REV CODE 250

## 2023-06-04 PROCEDURE — 37799 UNLISTED PX VASCULAR SURGERY: CPT

## 2023-06-04 PROCEDURE — 99900035 HC TECH TIME PER 15 MIN (STAT)

## 2023-06-04 PROCEDURE — 80202 ASSAY OF VANCOMYCIN: CPT | Performed by: STUDENT IN AN ORGANIZED HEALTH CARE EDUCATION/TRAINING PROGRAM

## 2023-06-04 PROCEDURE — 82550 ASSAY OF CK (CPK): CPT | Performed by: NURSE PRACTITIONER

## 2023-06-04 PROCEDURE — 27000190 HC CPAP FULL FACE MASK W/VALVE

## 2023-06-04 PROCEDURE — 25000003 PHARM REV CODE 250: Performed by: NURSE PRACTITIONER

## 2023-06-04 PROCEDURE — 20000000 HC ICU ROOM

## 2023-06-04 PROCEDURE — 80053 COMPREHEN METABOLIC PANEL: CPT

## 2023-06-04 PROCEDURE — 82803 BLOOD GASES ANY COMBINATION: CPT

## 2023-06-04 RX ORDER — HYDROMORPHONE HYDROCHLORIDE 1 MG/ML
0.5 INJECTION, SOLUTION INTRAMUSCULAR; INTRAVENOUS; SUBCUTANEOUS ONCE
Status: COMPLETED | OUTPATIENT
Start: 2023-06-04 | End: 2023-06-04

## 2023-06-04 RX ORDER — FUROSEMIDE 10 MG/ML
80 INJECTION INTRAMUSCULAR; INTRAVENOUS ONCE
Status: COMPLETED | OUTPATIENT
Start: 2023-06-04 | End: 2023-06-04

## 2023-06-04 RX ORDER — CALCIUM GLUCONATE 20 MG/ML
1 INJECTION, SOLUTION INTRAVENOUS
Status: COMPLETED | OUTPATIENT
Start: 2023-06-04 | End: 2023-06-04

## 2023-06-04 RX ADMIN — HYDROMORPHONE HYDROCHLORIDE 1 MG: 1 INJECTION, SOLUTION INTRAMUSCULAR; INTRAVENOUS; SUBCUTANEOUS at 11:06

## 2023-06-04 RX ADMIN — HYDROMORPHONE HYDROCHLORIDE 1 MG: 1 INJECTION, SOLUTION INTRAMUSCULAR; INTRAVENOUS; SUBCUTANEOUS at 12:06

## 2023-06-04 RX ADMIN — DEXMEDETOMIDINE HYDROCHLORIDE 1.4 MCG/KG/HR: 400 INJECTION INTRAVENOUS at 12:06

## 2023-06-04 RX ADMIN — DEXMEDETOMIDINE HYDROCHLORIDE 1.4 MCG/KG/HR: 400 INJECTION INTRAVENOUS at 03:06

## 2023-06-04 RX ADMIN — FUROSEMIDE 80 MG: 10 INJECTION, SOLUTION INTRAMUSCULAR; INTRAVENOUS at 01:06

## 2023-06-04 RX ADMIN — DEXMEDETOMIDINE HYDROCHLORIDE 1.4 MCG/KG/HR: 400 INJECTION INTRAVENOUS at 11:06

## 2023-06-04 RX ADMIN — SODIUM CHLORIDE 100 MG: 0.9 INJECTION, SOLUTION INTRAVENOUS at 08:06

## 2023-06-04 RX ADMIN — PIPERACILLIN AND TAZOBACTAM 4.5 G: 4; .5 INJECTION, POWDER, LYOPHILIZED, FOR SOLUTION INTRAVENOUS; PARENTERAL at 06:06

## 2023-06-04 RX ADMIN — MUPIROCIN: 20 OINTMENT TOPICAL at 08:06

## 2023-06-04 RX ADMIN — CALCIUM GLUCONATE 1 G: 20 INJECTION, SOLUTION INTRAVENOUS at 11:06

## 2023-06-04 RX ADMIN — LIDOCAINE PATCH 5% 1 PATCH: 700 PATCH TOPICAL at 05:06

## 2023-06-04 RX ADMIN — DEXMEDETOMIDINE HYDROCHLORIDE 1.4 MCG/KG/HR: 400 INJECTION INTRAVENOUS at 07:06

## 2023-06-04 RX ADMIN — DEXTROSE MONOHYDRATE 100 MG: 50 INJECTION, SOLUTION INTRAVENOUS at 08:06

## 2023-06-04 RX ADMIN — LEVETIRACETAM 500 MG: 500 TABLET, FILM COATED ORAL at 08:06

## 2023-06-04 RX ADMIN — VANCOMYCIN HYDROCHLORIDE 750 MG: 750 INJECTION, POWDER, LYOPHILIZED, FOR SOLUTION INTRAVENOUS at 11:06

## 2023-06-04 RX ADMIN — HEPARIN SODIUM 18 UNITS/KG/HR: 10000 INJECTION, SOLUTION INTRAVENOUS at 03:06

## 2023-06-04 RX ADMIN — PIPERACILLIN AND TAZOBACTAM 4.5 G: 4; .5 INJECTION, POWDER, LYOPHILIZED, FOR SOLUTION INTRAVENOUS; PARENTERAL at 05:06

## 2023-06-04 RX ADMIN — HYDROMORPHONE HYDROCHLORIDE 0.5 MG: 1 INJECTION, SOLUTION INTRAMUSCULAR; INTRAVENOUS; SUBCUTANEOUS at 08:06

## 2023-06-04 RX ADMIN — CALCIUM GLUCONATE 1 G: 20 INJECTION, SOLUTION INTRAVENOUS at 12:06

## 2023-06-04 RX ADMIN — CALCIUM GLUCONATE 1 G: 20 INJECTION, SOLUTION INTRAVENOUS at 02:06

## 2023-06-04 RX ADMIN — SODIUM CHLORIDE: 9 INJECTION, SOLUTION INTRAVENOUS at 07:06

## 2023-06-04 RX ADMIN — HYDROMORPHONE HYDROCHLORIDE 1 MG: 1 INJECTION, SOLUTION INTRAMUSCULAR; INTRAVENOUS; SUBCUTANEOUS at 05:06

## 2023-06-04 NOTE — PROGRESS NOTES
[facility] - Emergency Dept    Progress Note      Patient Name: Ryan Wild  MRN: [id number]  Admission Date: 6/2/2023  Attending Physician: Dewayne Torres MD   Primary Care Provider: Adilene Dillon NP    Patient information was obtained from patient and ER records.     Subjective:     Principal Problem:Non-traumatic rhabdomyolysis    Chief Complaint:   Chief Complaint   Patient presents with    Back Pain    Hypotension    Hearing Problem     PT REPORTS WAKING UP W LOWER BACK PAIN, WEAKNESS, SOB AND HEARING LOSS. 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  DENIES DRUG USE. HX OF SEIZURES, NON COMPLIANT W MEDS > 1 MONTH.  .  EKG OBTAINED.         HPI: Ryan Wild is a 32 y.o. male with history of seizures (off Keppra), drug use, hepatitis C (treated), and solitary kidney who presented to the ED on 6/2/2023  with a primary complaint of back pain. Patient is a  who had worked a 12 hour shift out in the sun the day prior. States that he went to bed feeling great, without any complaints. Then he woke up late for work day of admission and admits to not remembering much of what happened afterwards. His coworkers brought him to the ED. Admits to severe back pain that is aggravated by movement, weakness, shortness of breath. Also has not urinated all day. Denies chest pain, palpitations, headache, nausea, vomiting, abdominal pain, fevers. Last IV drug use 1 year ago, but snorted meth 2 days ago. Also uses IM steroids, last injection last night. Has not taken his home Keppra for the past few months; his last seizure was on 8/2022.      In the ED, patient presented hypotensive, tachycardic, SpO2 89% on RA. He was placed on bipap. Vasopressors were started when patient continued to be hypotensive with IVF. Labs significant for WBC 42.1, K 6.9, CO2 14, Cr 3.77, , . CPK 37k. LDH 2,2k. CBG normal. Troponin 1.878, EKG no  ischemic changes. Lactic 8.0. CXR  with increased interstitial markings in the right greater than left lung. Bedside Echo no abnormalities. Pt was given Vanc/Zosyn x1, and 4L bolus NS total. Medicine was called to admit patient for septic shock with multiorgan dysfunction.    Hospital course:  06/03/2023: Patient started on HD emergently, Levophed, Precedex, BiPAP, good response to Lasix  06/04/2023:  Recommend repeat HD today, nephro to see, off Levophed, continue Precedex, BiPAP, consider proning after dialysis, if no dialysis planned we will give repeat dose of Lasix    Interval history:  No acute events overnight, patient remains calm and on Precedex with no complaints other than wanting to have the BiPAP taken off to eat and drink.    Past Medical History:   Diagnosis Date    Depression     Opioid abuse     Seizures     Solitary kidney, congenital     Unspecified viral hepatitis C without hepatic coma        Past Surgical History:   Procedure Laterality Date    TONSILLECTOMY         Review of patient's allergies indicates:  No Known Allergies    No current facility-administered medications on file prior to encounter.     Current Outpatient Medications on File Prior to Encounter   Medication Sig    albuterol (PROVENTIL/VENTOLIN HFA) 90 mcg/actuation inhaler INHALE 2 PUFFS BY MOUTH EVERY 6 HOURS AS NEEDED FOR SHORTNESS OF BREATH OR WHEEZING    fluticasone propionate (FLONASE) 50 mcg/actuation nasal spray SHAKE LIQUID AND USE 2 SPRAYS IN EACH NOSTRIL DAILY    levETIRAcetam (KEPPRA) 500 MG Tab Take 1 tablet by mouth 2 (two) times daily.    QUEtiapine (SEROQUEL) 100 MG Tab Take 1 tablet (100 mg total) by mouth nightly.    valACYclovir (VALTREX) 1000 MG tablet Take 1,000 mg by mouth 3 (three) times daily.    venlafaxine (EFFEXOR-XR) 37.5 MG 24 hr capsule Take 37.5 mg by mouth every morning.     Family History       Problem Relation (Age of Onset)    Cerebral aneurysm Father          Tobacco Use    Smoking status:  Every Day     Types: Vaping with nicotine    Smokeless tobacco: Current   Substance and Sexual Activity    Alcohol use: Not Currently    Drug use: Not Currently     Types: Heroin, Methamphetamines     Comment: 3 years sober    Sexual activity: Yes     Partners: Female     Review of Systems   Constitutional:  Negative for chills and fever.   Gastrointestinal:  Negative for abdominal pain, nausea and vomiting.   Genitourinary:  Positive for difficulty urinating.   Musculoskeletal:  Positive for back pain.   Neurological:  Negative for headaches.   Psychiatric/Behavioral:  Negative for hallucinations. The patient is nervous/anxious.    Objective:     Vital Signs (Most Recent):  Temp: 98.6 °F (37 °C) (06/04/23 0722)  Pulse: 68 (06/04/23 0749)  Resp: (!) 24 (06/04/23 0749)  BP: 128/64 (06/04/23 0722)  SpO2: 97 % (06/04/23 0749) Vital Signs (24h Range):  Temp:  [98.1 °F (36.7 °C)-98.8 °F (37.1 °C)] 98.6 °F (37 °C)  Pulse:  [] 68  Resp:  [10-28] 24  SpO2:  [83 %-98 %] 97 %  BP: ()/() 128/64  Arterial Line BP: ()/(49-92) 137/74     Weight: 69.4 kg (153 lb)  Body mass index is 23.96 kg/m².    Physical Exam  Constitutional:       General: He is awake. He is in acute distress.      Appearance: Normal appearance. He is normal weight. He is ill-appearing.      Comments: Lying in bed on BIPAP. Groaning in pain.   Cardiovascular:      Rate and Rhythm: Normal rate and regular rhythm.      Pulses: Normal pulses.           Dorsalis pedis pulses are 2+ on the right side and 2+ on the left side.      Heart sounds: Normal heart sounds.      Arteriovenous access: Right and left arteriovenous access is present.  Pulmonary:      Effort: Pulmonary effort is normal.      Breath sounds: Normal air entry. Wheezing present.   Abdominal:      General: Abdomen is flat. Bowel sounds are normal.      Palpations: Abdomen is soft.      Tenderness: There is no abdominal tenderness. There is no guarding.   Musculoskeletal:       Lumbar back: Deformity and tenderness present. No signs of trauma or lacerations.      Right lower leg: No edema.      Left lower leg: No edema.      Comments: Bilateral lumbar firmness. Decreased range of motion due to discomfort.   Feet:      Right foot:      Skin integrity: Skin integrity normal.      Left foot:      Skin integrity: Skin integrity normal.   Skin:     General: Skin is warm.      Capillary Refill: Capillary refill takes more than 3 seconds.      Coloration: Skin is sallow. Skin is not cyanotic, jaundiced or mottled.   Neurological:      Mental Status: He is alert and oriented to person, place, and time.      GCS: GCS eye subscore is 4. GCS verbal subscore is 5. GCS motor subscore is 6.   Psychiatric:         Behavior: Behavior is cooperative.          Significant Labs: All pertinent labs within the past 24 hours have been reviewed.    Significant Imaging: I have reviewed all pertinent imaging results/findings within the past 24 hours.    Assessment/Plan:   Methamphetamine/fentanyl overdose  Acute toxic metabolic encephalopathy secondary to above  Acute renal failure stage III secondary to rhabdo secondary to above  History of polysubstance use disorder  History of anabolic steroid abuse  Shock  Pneumonia-community-acquired with possible component of aspiration  Sepsis secondary to above  Leukocytosis-improving  Hyperkalemia-improving with dialysis  Severe fluid overload  NSTEMI possibly type 2 however given high peak concern for type 1    - Continue ICU level of care, Precedex for compliance with BiPAP  - goal map 75-80 given significant KINGSLEY  -continue BiPAP, repeat ABG at noon  - shift potassium as necessary  -fluids per Nephrology on NS at 150 cc/hr, will give repeat 80 Lasix if no plans for dialysis  -nephrology consulted, appreciate recommendations, s/p 1 round of emergent dialysis with 500 UF  - right IJ dialysis cath placed by surgery  - Restarted Keppra. Keppra level pending. Patient reports  being off medication for months.  -continue heparin for NSTEMI, echo in a.m. EKG with new TWI in inferior lateral leads  - continue vanc Zosyn/azithromycin with renal dosing day 2    Vida Armijo MD  [facility] of [facility]

## 2023-06-04 NOTE — PROGRESS NOTES
[facility]  Nephrology Progress Note  Patient Name: Ryan Wild  Age: 32 y.o.  : 1990  MRN: [id number]  Admission Date: 2023    Chief complaint: Back Pain, Hypotension, and Hearing Problem (PT REPORTS WAKING UP W LOWER BACK PAIN, WEAKNESS, SOB AND HEARING LOSS. 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  DENIES DRUG USE. HX OF SEIZURES, NON COMPLIANT W MEDS > 1 MONTH.  .  EKG OBTAINED. )      Hospital course  Ryan Wild is a 32 y.o. White male with past medical history of seizures, polysubstance abuse, treated hepatitis-C, and solitary kidney.  Patient presented to the emergency department on 2023 with complaints of back pain, shortness breath, and decreased urinary frequency.  Patient believes that symptoms were precipitated by the fact that he has been working outside in the heat for 12 hours. He admitted to polysubstance abuse, including intravenous drug use, and anabolic steroid use.  UDS was positive for methamphetamines and fentanyl. In the emergency department, patient was tachycardic, hypotensive, and hypoxemic.  Laboratory results were remarkable for severe leukocytosis (WBC 41), azotemia, metabolic acidosis, hyperkalemia (serum potassium 6.9), transaminitis, hyperphosphatemia, elevated troponin, and elevated lactic acid level.  CK was 37,420 units per L.  Patient was admitted to the intensive care unit, placed on BiPAP, and emergently dialyzed for correction of life-threatening hyperkalemia on 2023.  Nephrology is continuing to follow for assistance with management of acute kidney injury and multiple electrolyte/acid-base abnormalities    Subjective  Patient is resting quietly in bed, on BiPAP, tolerating well.      Review of Systems  Respiratory: Positive for shortness of breath.    Cardiovascular:  Negative for chest pain or edema.    Objective  /64   Pulse 71   Temp 98.6 °F (37  "°C)   Resp 15   Ht 5' 7" (1.702 m)   Wt 69.4 kg (153 lb)   SpO2 (!) 90%   BMI 23.96 kg/m²     Intake/Output Summary (Last 24 hours) at 6/4/2023 1024  Last data filed at 6/4/2023 1010  Gross per 24 hour   Intake 5535.5 ml   Output 2360 ml   Net 3175.5 ml       Physical Exam  General appearance: Patient is in no acute distress. On NIPPV  HEENT: PERRLA, EOMI, no JVD. Neck is supple.  Right IJ dialysis catheter  Chest:  Mildly tachypneic.  Lung sounds are diminished to auscultation.    Heart: S1, S2.   Abdomen: Benign.  :  Umaña catheter to gravity with dark yellow urine in the collection chamber.  Extremities: No edema, peripheral pulses are palpable.   Neuro: No focal deficits.     Medications    Current Facility-Administered Medications:     0.9%  NaCl infusion, , Intravenous, Continuous, VICTORIA Hill, Last Rate: 100 mL/hr at 06/04/23 0844, Rate Change at 06/04/23 0844    calcium gluconate 1 g in NS IVPB (premixed), 1 g, Intravenous, PRN, Lm Yanes MD, Stopped at 06/04/23 0346    calcium gluconate 1 g in NS IVPB (premixed), 2 g, Intravenous, PRN, Lm Yanes MD    calcium gluconate 1 g in NS IVPB (premixed), 3 g, Intravenous, PRN, Lm Yanes MD    dexmedetomidine (PRECEDEX) 400mcg/100mL 0.9% NaCL infusion, 0-1.4 mcg/kg/hr, Intravenous, Continuous, Dillon Dominguez MD, Last Rate: 24.3 mL/hr at 06/04/23 0709, 1.4 mcg/kg/hr at 06/04/23 0709    dextrose 10% bolus 125 mL 125 mL, 12.5 g, Intravenous, PRN, Alda Valladares MD, Stopped at 06/02/23 2234    dextrose 40 % gel 15,000 mg, 15 g, Oral, PRN, Alda Valladares MD    dextrose 40 % gel 30,000 mg, 30 g, Oral, PRN, Alda Valladares MD    doxycycline (VIBRAMYCIN) 100 mg in sodium chloride 0.9% 250 mL IVPB, 100 mg, Intravenous, Q12H, Dominic Armijo MD    glucagon (human recombinant) injection 1 mg, 1 mg, Intramuscular, PRN, Alda Valladares MD    heparin 25,000 units in dextrose 5% (100 units/ml) IV bolus from bag - ADDITIONAL PRN BOLUS - 30 units/kg (max " bolus 4000 units), 30 Units/kg (Adjusted), Intravenous, PRN, Dillon Dominguez MD    heparin 25,000 units in dextrose 5% (100 units/ml) IV bolus from bag - ADDITIONAL PRN BOLUS - 60 units/kg (max bolus 4000 units), 59.3 Units/kg (Adjusted), Intravenous, PRN, Dillon Dominguez MD, 4,000 Units at 06/03/23 1343    heparin 25,000 units in dextrose 5% 250 mL (100 units/mL) infusion LOW INTENSITY nomogram - LAF, 0-40 Units/kg/hr (Adjusted), Intravenous, Continuous, Dillon Dominguez MD, Paused at 06/04/23 0101    HYDROmorphone injection 1 mg, 1 mg, Intravenous, Q6H PRN, Dominic Armijo MD, 1 mg at 06/04/23 0027    insulin regular injection 6.94 Units 0.0694 mL, 0.1 Units/kg, Intravenous, PRN, Dominic Armijo MD, 6.94 Units at 06/02/23 2112    levETIRAcetam tablet 500 mg, 500 mg, Oral, BID, Alda Valladares MD, 500 mg at 06/04/23 0809    LIDOcaine 5 % patch 1 patch, 1 patch, Transdermal, Q24H, Dillon Dominguez MD, 1 patch at 06/03/23 1626    mupirocin 2 % ointment, , Nasal, BID, Dewayne Torres MD, Given at 06/04/23 0809    naloxone 0.4 mg/mL injection 0.02 mg, 0.02 mg, Intravenous, PRN, Alda Valladares MD    NORepinephrine 4 mg in dextrose 5% 250 mL infusion (premix) (titrating), 0-3 mcg/kg/min, Intravenous, Continuous, Juan Manuel Miranda MD, Paused at 06/04/23 0200    piperacillin-tazobactam (ZOSYN) 4.5 g in sodium chloride 0.9 % 100 mL IVPB (MB+), 4.5 g, Intravenous, Q12H, Dominic Armijo MD    sodium chloride 0.9% flush 10 mL, 10 mL, Intravenous, Q12H PRN, Alda Valladares MD    Pharmacy to dose Vancomycin consult, , , Once **AND** vancomycin - pharmacy to dose, , Intravenous, pharmacy to manage frequency, Alda Valladares MD     Imaging:    Reviewed    Laboratory Data:  Hematology  Lab Results   Component Value Date    WBC 20.24 (H) 06/04/2023    HGB 11.3 (L) 06/04/2023    HCT 34.8 (L) 06/04/2023     06/04/2023     (L) 06/04/2023    K 4.7 06/04/2023    CHLORIDE 101 06/04/2023    CO2 23 06/04/2023    BUN 34.3 (H) 06/04/2023     CREATININE 3.59 (H) 06/04/2023    EGFRNORACEVR 22 06/04/2023    GLUCOSE 80 06/04/2023    CALCIUM 7.0 (L) 06/04/2023    ALKPHOS 34 (L) 06/04/2023    LABPROT 5.1 (L) 06/04/2023    ALBUMIN 2.5 (L) 06/04/2023     (H) 06/04/2023     (H) 06/04/2023    MG 2.20 06/04/2023    PHOS 4.2 06/04/2023     Lab Results   Component Value Date    FERRITIN 153.40 09/14/2022    LDH 2,294 (H) 06/02/2023       Lab Results   Component Value Date    HIV Nonreactive 09/14/2022    HEPBSURFAG Nonreactive 06/02/2023    HEPBSAB Nonreactive 09/14/2022    HEPBCAB Nonreactive 09/14/2022         Impression  Nonoliguric acute kidney injury, 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 and/or hypoperfusion  Solitary kidney  Rhabdomyolysis   Hypocalcemia  Transaminitis  Sepsis   Multifocal pneumonia   Respiratory failure  NSTEMI  History of seizure disorder   Polysubstance abuse  Treated hepatitis-C    Plan  There are no acute indications for hemodialysis today.  Continue supportive care, will decrease IV fluid rate to 100 mL/hr since patient is now hemodynamically stable.  Will give 2 g of calcium gluconate for treatment of hypocalcemia (calcium corrected for albumin is 8.2).  Will reassess need for renal replacement therapy in a.m..    La Figueroa NP  [facility]ph[facility]   6/4/2023

## 2023-06-05 LAB
A-ADO2 BLOOD GAS (OHS): 546 MMHG
A-ADO2 BLOOD GAS (OHS): 573 MMHG
ALBUMIN SERPL-MCNC: 2.4 G/DL (ref 3.5–5)
ALBUMIN/GLOB SERPL: 0.8 RATIO (ref 1.1–2)
ALLENS TEST BLOOD GAS (OHS): ABNORMAL
ALLENS TEST BLOOD GAS (OHS): ABNORMAL
ALP SERPL-CCNC: 55 UNIT/L (ref 40–150)
ALT SERPL-CCNC: 434 UNIT/L (ref 0–55)
APTT PPP: 59.2 SECONDS
APTT PPP: 60 SECONDS
APTT PPP: 66.4 SECONDS
AST SERPL-CCNC: 766 UNIT/L (ref 5–34)
AV INDEX (PROSTH): 0.95
AV MEAN GRADIENT: 4 MMHG
AV PEAK GRADIENT: 5 MMHG
AV VALVE AREA: 3.3 CM2
AV VELOCITY RATIO: 0.98
BACTERIA UR CULT: NO GROWTH
BASE EXCESS BLD CALC-SCNC: -0.5 MMOL/L (ref -2–2)
BASE EXCESS BLD CALC-SCNC: 2.8 MMOL/L (ref -2–2)
BASOPHILS # BLD AUTO: 0.05 X10(3)/MCL
BASOPHILS NFR BLD AUTO: 0.2 %
BILIRUBIN DIRECT+TOT PNL SERPL-MCNC: 0.4 MG/DL
BIPAP(E) BLOOD GAS (OHS): 12 CM H2O
BIPAP(I) BLOOD GAS (OHS): 18 CM H2O
BLOOD GAS SAMPLE TYPE (OHS): ABNORMAL
BLOOD GAS SAMPLE TYPE (OHS): ABNORMAL
BSA FOR ECHO PROCEDURE: 1.81 M2
BUN SERPL-MCNC: 46.7 MG/DL (ref 8.9–20.6)
CALCIUM SERPL-MCNC: 7.5 MG/DL (ref 8.4–10.2)
CHLORIDE SERPL-SCNC: 106 MMOL/L (ref 98–107)
CK SERPL-CCNC: ABNORMAL U/L (ref 30–200)
CO2 BLDA-SCNC: 25.4 MMOL/L (ref 22–26)
CO2 BLDA-SCNC: 28.4 MMOL/L (ref 22–26)
CO2 SERPL-SCNC: 20 MMOL/L (ref 22–29)
COHGB MFR BLDA: 1.1 % (ref 0.5–1.5)
COHGB MFR BLDA: 1.4 % (ref 0.5–1.5)
CPAP BLOOD GAS (OHS): 10 CM H2O
CREAT SERPL-MCNC: 4.75 MG/DL (ref 0.73–1.18)
CV ECHO LV RWT: 0.33 CM
DOP CALC AO PEAK VEL: 1.15 M/S
DOP CALC AO VTI: 21.1 CM
DOP CALC LVOT AREA: 3.5 CM2
DOP CALC LVOT DIAMETER: 2.1 CM
DOP CALC LVOT PEAK VEL: 1.13 M/S
DOP CALC LVOT STROKE VOLUME: 69.58 CM3
DOP CALC MV VTI: 23.3 CM
DOP CALCLVOT PEAK VEL VTI: 20.1 CM
DRAWN BY BLOOD GAS (OHS): ABNORMAL
DRAWN BY BLOOD GAS (OHS): ABNORMAL
E WAVE DECELERATION TIME: 202.5 MSEC
E/A RATIO: 1.63
E/E' RATIO: 7.75 M/S
ECHO LV POSTERIOR WALL: 0.75 CM (ref 0.6–1.1)
EJECTION FRACTION: 50 %
EOSINOPHIL # BLD AUTO: 0.04 X10(3)/MCL (ref 0–0.9)
EOSINOPHIL NFR BLD AUTO: 0.2 %
ERYTHROCYTE [DISTWIDTH] IN BLOOD BY AUTOMATED COUNT: 12.9 % (ref 11.5–17)
FIO2 BLOOD GAS (OHS): 100 %
FIO2 BLOOD GAS (OHS): 100 %
FRACTIONAL SHORTENING: 27 % (ref 28–44)
GFR SERPLBLD CREATININE-BSD FMLA CKD-EPI: 16 MLS/MIN/1.73/M2
GLOBULIN SER-MCNC: 3 GM/DL (ref 2.4–3.5)
GLUCOSE SERPL-MCNC: 75 MG/DL (ref 74–100)
HCO3 BLDA-SCNC: 24.2 MMOL/L (ref 22–26)
HCO3 BLDA-SCNC: 27.2 MMOL/L (ref 22–26)
HCT VFR BLD AUTO: 35.8 % (ref 42–52)
HGB BLD-MCNC: 11.5 G/DL (ref 14–18)
IMM GRANULOCYTES # BLD AUTO: 0.17 X10(3)/MCL (ref 0–0.04)
IMM GRANULOCYTES NFR BLD AUTO: 0.8 %
INTERVENTRICULAR SEPTUM: 0.75 CM (ref 0.6–1.1)
LEFT ATRIUM SIZE: 3.25 CM
LEFT INTERNAL DIMENSION IN SYSTOLE: 3.37 CM (ref 2.1–4)
LEFT VENTRICLE DIASTOLIC VOLUME INDEX: 53.73 ML/M2
LEFT VENTRICLE DIASTOLIC VOLUME: 96.71 ML
LEFT VENTRICLE MASS INDEX: 60 G/M2
LEFT VENTRICLE SYSTOLIC VOLUME INDEX: 25.8 ML/M2
LEFT VENTRICLE SYSTOLIC VOLUME: 46.46 ML
LEFT VENTRICULAR INTERNAL DIMENSION IN DIASTOLE: 4.59 CM (ref 3.5–6)
LEFT VENTRICULAR MASS: 108.06 G
LV LATERAL E/E' RATIO: 7.75 M/S
LV SEPTAL E/E' RATIO: 7.75 M/S
LVOT MG: 2.41 MMHG
LVOT MV: 0.71 CM/S
LYMPHOCYTES # BLD AUTO: 1.23 X10(3)/MCL (ref 0.6–4.6)
LYMPHOCYTES NFR BLD AUTO: 5.4 %
MAGNESIUM SERPL-MCNC: 2.1 MG/DL (ref 1.6–2.6)
MCH RBC QN AUTO: 28.9 PG (ref 27–31)
MCHC RBC AUTO-ENTMCNC: 32.1 G/DL (ref 33–36)
MCV RBC AUTO: 89.9 FL (ref 80–94)
METHGB MFR BLDA: 0.5 % (ref 0–1.5)
METHGB MFR BLDA: 0.9 % (ref 0–1.5)
MODE (OHS): ABNORMAL
MONOCYTES # BLD AUTO: 1.59 X10(3)/MCL (ref 0.1–1.3)
MONOCYTES NFR BLD AUTO: 7 %
MV MEAN GRADIENT: 1 MMHG
MV PEAK A VEL: 0.57 M/S
MV PEAK E VEL: 0.93 M/S
MV PEAK GRADIENT: 4 MMHG
MV STENOSIS PRESSURE HALF TIME: 58.72 MS
MV VALVE AREA BY CONTINUITY EQUATION: 2.99 CM2
MV VALVE AREA P 1/2 METHOD: 3.75 CM2
NEUTROPHILS # BLD AUTO: 19.5 X10(3)/MCL (ref 2.1–9.2)
NEUTROPHILS NFR BLD AUTO: 86.4 %
NRBC BLD AUTO-RTO: 0 %
O2 HB BLOOD GAS (OHS): 96.7 % (ref 94–100)
O2 HB BLOOD GAS (OHS): 97.2 % (ref 94–100)
OHS LV EJECTION FRACTION SIMPSONS BIPLANE MOD: 6 %
OXYHGB MFR BLDA: 11.4 G/DL (ref 12–18)
OXYHGB MFR BLDA: 11.8 G/DL (ref 12–18)
PAO2 BLOOD GAS (OHS): 663 MMHG
PAO2 BLOOD GAS (OHS): 664 MMHG
PCO2 BLDA: 39 MMHG (ref 35–45)
PCO2 BLDA: 40 MMHG (ref 35–45)
PH BLDA: 7.4 [PH] (ref 7.35–7.45)
PH BLDA: 7.44 [PH] (ref 7.35–7.45)
PHOSPHATE SERPL-MCNC: 4.1 MG/DL (ref 2.3–4.7)
PISA TR MAX VEL: 1.17 M/S
PLATELET # BLD AUTO: 215 X10(3)/MCL (ref 130–400)
PMV BLD AUTO: 10.3 FL (ref 7.4–10.4)
PO2 BLDA: 117 MMHG (ref 75–100)
PO2 BLDA: 91 MMHG (ref 75–100)
POTASSIUM SERPL-SCNC: 4.7 MMOL/L (ref 3.5–5.1)
PROT SERPL-MCNC: 5.4 GM/DL (ref 6.4–8.3)
RA PRESSURE: 15 MMHG
RBC # BLD AUTO: 3.98 X10(6)/MCL (ref 4.7–6.1)
RIGHT VENTRICULAR END-DIASTOLIC DIMENSION: 1.96 CM
SAMPLE SITE BLOOD GAS (OHS): ABNORMAL
SAMPLE SITE BLOOD GAS (OHS): ABNORMAL
SAO2 % BLDA: 98.5 %
SAO2 % BLDA: 99.1 %
SODIUM SERPL-SCNC: 138 MMOL/L (ref 136–145)
TDI LATERAL: 0.12 M/S
TDI SEPTAL: 0.12 M/S
TDI: 0.12 M/S
TR MAX PG: 5 MMHG
TV REST PULMONARY ARTERY PRESSURE: 20 MMHG
WBC # SPEC AUTO: 22.58 X10(3)/MCL (ref 4.5–11.5)

## 2023-06-05 PROCEDURE — 25000003 PHARM REV CODE 250: Performed by: FAMILY MEDICINE

## 2023-06-05 PROCEDURE — 80100016 HC MAINTENANCE HEMODIALYSIS

## 2023-06-05 PROCEDURE — 27000221 HC OXYGEN, UP TO 24 HOURS

## 2023-06-05 PROCEDURE — 25000003 PHARM REV CODE 250: Performed by: NURSE PRACTITIONER

## 2023-06-05 PROCEDURE — 25000003 PHARM REV CODE 250: Performed by: STUDENT IN AN ORGANIZED HEALTH CARE EDUCATION/TRAINING PROGRAM

## 2023-06-05 PROCEDURE — 83735 ASSAY OF MAGNESIUM: CPT

## 2023-06-05 PROCEDURE — 99233 SBSQ HOSP IP/OBS HIGH 50: CPT | Mod: ,,, | Performed by: INTERNAL MEDICINE

## 2023-06-05 PROCEDURE — 63600175 PHARM REV CODE 636 W HCPCS: Performed by: FAMILY MEDICINE

## 2023-06-05 PROCEDURE — 99900035 HC TECH TIME PER 15 MIN (STAT)

## 2023-06-05 PROCEDURE — 99233 PR SUBSEQUENT HOSPITAL CARE,LEVL III: ICD-10-PCS | Mod: ,,, | Performed by: INTERNAL MEDICINE

## 2023-06-05 PROCEDURE — 84100 ASSAY OF PHOSPHORUS: CPT

## 2023-06-05 PROCEDURE — 94660 CPAP INITIATION&MGMT: CPT

## 2023-06-05 PROCEDURE — 20000000 HC ICU ROOM

## 2023-06-05 PROCEDURE — 94761 N-INVAS EAR/PLS OXIMETRY MLT: CPT

## 2023-06-05 PROCEDURE — 80053 COMPREHEN METABOLIC PANEL: CPT

## 2023-06-05 PROCEDURE — 82550 ASSAY OF CK (CPK): CPT | Performed by: NURSE PRACTITIONER

## 2023-06-05 PROCEDURE — 85730 THROMBOPLASTIN TIME PARTIAL: CPT | Performed by: STUDENT IN AN ORGANIZED HEALTH CARE EDUCATION/TRAINING PROGRAM

## 2023-06-05 PROCEDURE — 63600175 PHARM REV CODE 636 W HCPCS: Performed by: STUDENT IN AN ORGANIZED HEALTH CARE EDUCATION/TRAINING PROGRAM

## 2023-06-05 PROCEDURE — 85025 COMPLETE CBC W/AUTO DIFF WBC: CPT | Performed by: STUDENT IN AN ORGANIZED HEALTH CARE EDUCATION/TRAINING PROGRAM

## 2023-06-05 RX ORDER — LEVETIRACETAM 5 MG/ML
500 INJECTION INTRAVASCULAR EVERY 12 HOURS
Status: DISCONTINUED | OUTPATIENT
Start: 2023-06-05 | End: 2023-06-08

## 2023-06-05 RX ADMIN — HEPARIN SODIUM 25 UNITS/KG/HR: 10000 INJECTION, SOLUTION INTRAVENOUS at 06:06

## 2023-06-05 RX ADMIN — PIPERACILLIN AND TAZOBACTAM 4.5 G: 4; .5 INJECTION, POWDER, LYOPHILIZED, FOR SOLUTION INTRAVENOUS; PARENTERAL at 05:06

## 2023-06-05 RX ADMIN — LIDOCAINE PATCH 5% 1 PATCH: 700 PATCH TOPICAL at 04:06

## 2023-06-05 RX ADMIN — SODIUM CHLORIDE 100 MG: 0.9 INJECTION, SOLUTION INTRAVENOUS at 07:06

## 2023-06-05 RX ADMIN — LEVETIRACETAM 500 MG: 5 INJECTION INTRAVENOUS at 08:06

## 2023-06-05 RX ADMIN — MUPIROCIN: 20 OINTMENT TOPICAL at 08:06

## 2023-06-05 RX ADMIN — SODIUM CHLORIDE: 9 INJECTION, SOLUTION INTRAVENOUS at 05:06

## 2023-06-05 RX ADMIN — HYDROMORPHONE HYDROCHLORIDE 1 MG: 1 INJECTION, SOLUTION INTRAMUSCULAR; INTRAVENOUS; SUBCUTANEOUS at 09:06

## 2023-06-05 RX ADMIN — HYDROMORPHONE HYDROCHLORIDE 1 MG: 1 INJECTION, SOLUTION INTRAMUSCULAR; INTRAVENOUS; SUBCUTANEOUS at 10:06

## 2023-06-05 RX ADMIN — DEXMEDETOMIDINE HYDROCHLORIDE 1.4 MCG/KG/HR: 400 INJECTION INTRAVENOUS at 06:06

## 2023-06-05 RX ADMIN — DEXMEDETOMIDINE HYDROCHLORIDE 1.4 MCG/KG/HR: 400 INJECTION INTRAVENOUS at 10:06

## 2023-06-05 RX ADMIN — HYDROMORPHONE HYDROCHLORIDE 1 MG: 1 INJECTION, SOLUTION INTRAMUSCULAR; INTRAVENOUS; SUBCUTANEOUS at 05:06

## 2023-06-05 RX ADMIN — SODIUM CHLORIDE 100 MG: 0.9 INJECTION, SOLUTION INTRAVENOUS at 08:06

## 2023-06-05 RX ADMIN — DEXMEDETOMIDINE HYDROCHLORIDE 1.4 MCG/KG/HR: 400 INJECTION INTRAVENOUS at 05:06

## 2023-06-05 RX ADMIN — DEXMEDETOMIDINE HYDROCHLORIDE 1.4 MCG/KG/HR: 400 INJECTION INTRAVENOUS at 02:06

## 2023-06-05 RX ADMIN — SODIUM CHLORIDE: 9 INJECTION, SOLUTION INTRAVENOUS at 08:06

## 2023-06-05 NOTE — CARE UPDATE
Evening rounds on patient.  Family at bedside.  Patient improved following hemodialysis today.  tolerating Vapotherm. 92% saturation.  Eating fruit without difficulty.  Still appears fatigued.    Discussed placing patient back on CPAP while sleeping.  We will continue weaning process in a.m..    Dillon Dominguez MD, MPH  [facility]

## 2023-06-05 NOTE — PROGRESS NOTES
Inpatient Nutrition Assessment    Admit Date: 6/2/2023   Total duration of encounter: 3 days     Nutrition Recommendation/Prescription     Continue regular diet (encourage oral intake--if long term renal failure ---then change renal diet)  Will order boost plus tid for added nutrition; Boost Plus (provides 360 kcal, 14 g protein per serving)   If pt continue to not eat with bipap--? Need use enteral feedings--TF Isosource 1.5@ 60ml/hr; (23 hr cycle) to provide 2070 calories, 94 gm protein, 1054 ml free water. Flush tube with 100 ml water every 4 hrs.   MVI/fe  Daily wt  Will monitor nutrition status     Communication of Recommendations: reviewed with nurse    Nutrition Assessment     Malnutrition Assessment/Nutrition-Focused Physical Exam    Malnutrition Context: acute illness or injury  Malnutrition Level: other (see comments) (pt not able to give full diet/wt hx; not meeting malnutrition criteria)  Energy Intake (Malnutrition): less than or equal to 50% for greater than or equal to 5 days                                                     A minimum of two characteristics is recommended for diagnosis of either severe or non-severe malnutrition.    Chart Review    Reason Seen: continuous nutrition monitoring    Malnutrition Screening Tool Results   Have you recently lost weight without trying?: No  Have you been eating poorly because of a decreased appetite?: No   MST Score: 0     Diagnosis:  Meth/fentanyl OD, metabolic encephalopathy, ARF/ATN/rhabdo--on HD; hx polysubstance abuse, shock, PNA, fluid overload, NSTEMI     Relevant Medical History: seizure--drug use, Hep C, solitary kidney     Nutrition-Related Medications: IVF 0.9% NaCl @ 100 ml/hr , precedex, doxycycline, zosyn   Calorie Containing IV Medications: no significant kcals from medications at this time    Nutrition-Related Labs:  (6-5) H/H 11.5/35.8(L) Gluc 75 bun 46.7(H) Cr 4.7(H) GFR 16(L) K 4.7 Alb 2.4(L) (H) (H)     Diet/PN Order: Diet  "Adult Regular  Oral Supplement Order: none  Tube Feeding Order: none  Appetite/Oral Intake: poor/0-25% of meals  Factors Affecting Nutritional Intake: decreased appetite, respiratory status, and shortness of breath  Food/Sikh/Cultural Preferences: none reported  Food Allergies: none reported       Wound(s):   none    Comments    () Pt receiving HD: has bipap; per RN--pt has been agitated; unable to remove mask to eat at this time; unable to obtain diet/wt hx; per EMR--current wt elevated/ ? Fluid; both diet/TF recs provided to meet nutrient needs.     Anthropometrics    Height: 5' 7" (170.2 cm) Height Method: Stated  Last Weight: 69.4 kg (153 lb) (23 1310) Weight Method: Standard Scale  BMI (Calculated): 24  BMI Classification: normal (BMI 18.5-24.9)        Ideal Body Weight (IBW), Male: 148 lb     % Ideal Body Weight, Male (lb): 103.38 %                 Usual Body Weight (UBW), kg:  (pt unable to provide wt hx; EMR wt (2-13) 61.7kg)        Usual Weight Provided By: EMR weight history    Wt Readings from Last 5 Encounters:   23 69.4 kg (153 lb)   23 61.7 kg (136 lb)   10/10/22 63.3 kg (139 lb 8 oz)   22 64.3 kg (141 lb 12.8 oz)   05/10/22 64.4 kg (141 lb 15.6 oz)     Weight Change(s) Since Admission:  Admit Weight: 69.4 kg (153 lb) (23 1310)  No hx     Estimated Needs    Weight Used For Calorie Calculations: 69.4 kg (153 lb)  Energy Calorie Requirements (kcal): 2082 kcal/d; 30 vanita/kg  Energy Need Method: Kcal/kg  Weight Used For Protein Calculations: 69.4 kg (153 lb)  Protein Requirements: 83 gm protein/d; 1.2 gm/kg ; on HD for ARF  Fluid Requirements (mL): 2082 ml/d; 1ml/vanita  Temp (24hrs), Av.6 °F (36.4 °C), Min:97.3 °F (36.3 °C), Max:98 °F (36.7 °C)       Enteral Nutrition    Patient not receiving enteral nutrition at this time.    Parenteral Nutrition    Patient not receiving parenteral nutrition support at this time.    Evaluation of Received Nutrient Intake    Calories: " not meeting estimated needs  Protein: not meeting estimated needs    Patient Education    Not applicable.    Nutrition Diagnosis     PES: Inadequate oral intake related to acute illness as evidenced by SOB/on bipap/ eating < 25% . (new)    Interventions/Goals     Intervention(s): general/healthful diet, commercial beverage, multivitamin/mineral supplement therapy, and collaboration with other providers  Goal: Meet greater than 75% of nutritional needs by follow-up. (new)    Monitoring & Evaluation     Dietitian will monitor food and beverage intake, weight, and electrolyte/renal panel.  Nutrition Risk/Follow-Up: moderate (follow-up in 3-5 days)   Please consult if re-assessment needed sooner.

## 2023-06-05 NOTE — PROCEDURES
"Ryan Wild is a 32 y.o. male patient.    Temp: 97.3 °F (36.3 °C) (06/05/23 0315)  Pulse: 75 (06/05/23 0700)  Resp: 18 (06/05/23 0700)  BP: (!) 164/78 (06/05/23 0700)  SpO2: 99 % (06/05/23 0700)  Weight: 69.4 kg (153 lb) (06/02/23 1310)  Height: 5' 7" (170.2 cm) (06/02/23 1310)       Arterial Line    Date/Time: 6/2/2023 4:12 PM  Location procedure was performed: [facility]  Performed by: Dominic Armijo MD  Authorized by: Dominic Armijo MD   Consent Done: Emergent Situation  Preparation: Patient was prepped and draped in the usual sterile fashion.  Indications: multiple ABGs, respiratory failure and hemodynamic monitoring  Location: right radial  Anesthesia: local infiltration    Anesthesia:  Local Anesthetic: lidocaine 1% without epinephrine  Anesthetic total: 5 mL  Raslan's test normal: yes  Needle gauge: 20  Seldinger technique: Seldinger technique used  Number of attempts: 1  Complications: No  Estimated blood loss (mL): 1  Specimens: No  Implants: No  Post-procedure: dressing applied  Post-procedure CMS: unchanged  Patient tolerance: Patient tolerated the procedure well with no immediate complications        6/2/2023    "

## 2023-06-05 NOTE — PROGRESS NOTES
[facility] - Emergency Dept    Progress Note      Patient Name: Ryan Wild  MRN: [id number]  Admission Date: 6/2/2023  Attending Physician: Dewayne Torres MD   Primary Care Provider: Adilene Dillon NP    Patient information was obtained from patient and ER records.     Subjective:     Principal Problem:Non-traumatic rhabdomyolysis    Chief Complaint:   Chief Complaint   Patient presents with    Back Pain    Hypotension    Hearing Problem     PT REPORTS WAKING UP W LOWER BACK PAIN, WEAKNESS, SOB AND HEARING LOSS. 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  DENIES DRUG USE. HX OF SEIZURES, NON COMPLIANT W MEDS > 1 MONTH.  .  EKG OBTAINED.         HPI: Ryan Wild is a 32 y.o. male with history of seizures (off Keppra), drug use, hepatitis C (treated), and solitary kidney who presented to the ED on 6/2/2023  with a primary complaint of back pain. Patient is a  who had worked a 12 hour shift out in the sun the day prior. States that he went to bed feeling great, without any complaints. Then he woke up late for work day of admission and admits to not remembering much of what happened afterwards. His coworkers brought him to the ED. Admits to severe back pain that is aggravated by movement, weakness, shortness of breath. Also has not urinated all day. Denies chest pain, palpitations, headache, nausea, vomiting, abdominal pain, fevers. Last IV drug use 1 year ago, but snorted meth 2 days ago. Also uses IM steroids, last injection last night. Has not taken his home Keppra for the past few months; his last seizure was on 8/2022.      In the ED, patient presented hypotensive, tachycardic, SpO2 89% on RA. He was placed on bipap. Vasopressors were started when patient continued to be hypotensive with IVF. Labs significant for WBC 42.1, K 6.9, CO2 14, Cr 3.77, , . CPK 37k. LDH 2,2k. CBG normal. Troponin 1.878, EKG no  ischemic changes. Lactic 8.0. CXR  with increased interstitial markings in the right greater than left lung. Bedside Echo no abnormalities. Pt was given Vanc/Zosyn x1, and 4L bolus NS total. Medicine was called to admit patient for septic shock with multiorgan dysfunction.    Hospital course:  06/03/2023: Patient started on HD emergently, Levophed, Precedex, BiPAP, good response to Lasix  06/04/2023:  Recommend repeat HD today, nephro to see, off Levophed, continue Precedex, BiPAP, consider proning after dialysis, if no dialysis planned we will give repeat dose of Lasix    Interval history:  No acute events overnight, patient remains on Precedex at max. Concerned about his status and why he is still in-house. Unable to tolerate vapotherm or proning for any appreciable amount of time. Currently being dialyzed.     Past Medical History:   Diagnosis Date    Depression     Opioid abuse     Seizures     Solitary kidney, congenital     Unspecified viral hepatitis C without hepatic coma        Past Surgical History:   Procedure Laterality Date    TONSILLECTOMY         Review of patient's allergies indicates:  No Known Allergies    No current facility-administered medications on file prior to encounter.     Current Outpatient Medications on File Prior to Encounter   Medication Sig    albuterol (PROVENTIL/VENTOLIN HFA) 90 mcg/actuation inhaler INHALE 2 PUFFS BY MOUTH EVERY 6 HOURS AS NEEDED FOR SHORTNESS OF BREATH OR WHEEZING    fluticasone propionate (FLONASE) 50 mcg/actuation nasal spray SHAKE LIQUID AND USE 2 SPRAYS IN EACH NOSTRIL DAILY    levETIRAcetam (KEPPRA) 500 MG Tab Take 1 tablet by mouth 2 (two) times daily.    QUEtiapine (SEROQUEL) 100 MG Tab Take 1 tablet (100 mg total) by mouth nightly.    valACYclovir (VALTREX) 1000 MG tablet Take 1,000 mg by mouth 3 (three) times daily.    venlafaxine (EFFEXOR-XR) 37.5 MG 24 hr capsule Take 37.5 mg by mouth every morning.     Family History       Problem Relation (Age of  Onset)    Cerebral aneurysm Father          Tobacco Use    Smoking status: Every Day     Types: Vaping with nicotine    Smokeless tobacco: Current   Substance and Sexual Activity    Alcohol use: Not Currently    Drug use: Not Currently     Types: Heroin, Methamphetamines     Comment: 3 years sober    Sexual activity: Yes     Partners: Female     Review of Systems   Constitutional:  Negative for chills and fever.   Gastrointestinal:  Negative for abdominal pain, nausea and vomiting.   Genitourinary:  Negative for difficulty urinating.   Musculoskeletal:  Positive for back pain.   Neurological:  Negative for headaches.   Psychiatric/Behavioral:  Negative for hallucinations. The patient is nervous/anxious.    Objective:     Vital Signs (Most Recent):  Temp: 97.3 °F (36.3 °C) (06/05/23 0315)  Pulse: 71 (06/05/23 0800)  Resp: 18 (06/05/23 0700)  BP: (!) 164/78 (06/05/23 0700)  SpO2: 100 % (06/05/23 0800) Vital Signs (24h Range):  Temp:  [97.3 °F (36.3 °C)-98.2 °F (36.8 °C)] 97.3 °F (36.3 °C)  Pulse:  [61-91] 71  Resp:  [13-30] 18  SpO2:  [86 %-100 %] 100 %  BP: (118-177)/() 164/78  Arterial Line BP: (123-180)/() 149/83     Weight: 69.4 kg (153 lb)  Body mass index is 23.96 kg/m².    Physical Exam  Constitutional:       General: He is awake. He is in acute distress.      Appearance: Normal appearance. He is normal weight. He is ill-appearing.      Comments: Lying in bed on BIPAP.    Cardiovascular:      Rate and Rhythm: Normal rate and regular rhythm.      Pulses: Normal pulses.           Dorsalis pedis pulses are 2+ on the right side and 2+ on the left side.      Heart sounds: Normal heart sounds.      Arteriovenous access: Right and left arteriovenous access is present.  Pulmonary:      Effort: Pulmonary effort is normal.      Breath sounds: Normal air entry. Wheezing present.   Abdominal:      General: Abdomen is flat. Bowel sounds are normal.      Palpations: Abdomen is soft.      Tenderness: There is no  abdominal tenderness. There is no guarding.   Musculoskeletal:      Lumbar back: Deformity and tenderness present. No signs of trauma or lacerations.      Right lower leg: No edema.      Left lower leg: No edema.      Comments: Bilateral lumbar firmness. Lidocaine patch in place.   Feet:      Right foot:      Skin integrity: Skin integrity normal.      Left foot:      Skin integrity: Skin integrity normal.   Skin:     General: Skin is warm.      Capillary Refill: Capillary refill takes 2 to 3 seconds.      Coloration: Skin is sallow. Skin is not cyanotic, jaundiced or mottled.   Neurological:      Mental Status: He is alert and oriented to person, place, and time.      GCS: GCS eye subscore is 4. GCS verbal subscore is 5. GCS motor subscore is 6.   Psychiatric:         Behavior: Behavior is cooperative.          Significant Labs: All pertinent labs within the past 24 hours have been reviewed.    Significant Imaging: I have reviewed all pertinent imaging results/findings within the past 24 hours.    Assessment/Plan:   Methamphetamine/fentanyl overdose  Acute toxic metabolic encephalopathy secondary to above  Acute renal failure stage III secondary to rhabdo secondary to above  History of polysubstance use disorder  History of anabolic steroid abuse  Shock  Pneumonia-community-acquired with possible component of aspiration  Sepsis secondary to above  Leukocytosis-improving  Hyperkalemia-improving with dialysis  Severe fluid overload  NSTEMI possibly type 2 however given high peak concern for type 1    - Continue ICU level of care, Precedex for compliance with BiPAP.  -continue BiPAP, repeat ABG at noon  - Off levophed. Hypertensive (). Goal map 75-80 given significant KINGSLEY  - shift potassium as necessary.  -fluids per Nephrology on NS at 100 cc/hr.  -nephrology following, appreciate recommendations. HD today.   - right IJ dialysis cath placed by surgery  - Continue Keppra 500mg IV. Keppra level pending. Patient  reports being off medication for months.  -continue heparin for NSTEMI, echo in a.m. EKG with new TWI in inferior lateral leads  - continue vanc/Zosyn/doxy with renal dosing day 3    Dillon Doimnguez MD  Department of Hospital Medicine

## 2023-06-05 NOTE — NURSING
06/05/23 1107        Hemodialysis Catheter 06/02/23 2200 right internal jugular   Placement Date/Time: 06/02/23 2200   Present Prior to Hospital Arrival?: No  Hand Hygiene: Performed  Barrier Precautions: Performed  Skin Antisepsis: ChloraPrep  Location: right internal jugular  Insertion attempts (enter comment if more than 2 attem...   Line Necessity Review CRRT/HD   Site Assessment No redness;No drainage;No swelling   Line Securement Device Secured with sutures   Dressing Type CHG impregnated dressing/sponge   Dressing Status Clean;Dry;Intact   Dressing Intervention Integrity maintained   Date on Dressing 06/02/23   Dressing Due to be Changed 06/07/23   Venous Patency/Care normal saline locked   Arterial Patency/Care normal saline locked   Post-Hemodialysis Assessment   Blood Volume Processed (Liters) 63 L   Dialyzer Clearance Lightly streaked   Duration of Treatment 180 minutes   Total UF (mL) 1000 mL   Net Fluid Removal 500   Patient Response to Treatment Tolerated well.  No issues or complaints.  Net fluid removal of 500 ml.  Dialyzed 3 hours.  Vitals stable.   Post-Hemodialysis Comments Deaccessed per p and p.  Tolerated well.

## 2023-06-05 NOTE — PLAN OF CARE
Problem: Infection  Goal: Absence of Infection Signs and Symptoms  Outcome: Ongoing, Progressing     Problem: Adult Inpatient Plan of Care  Goal: Plan of Care Review  Outcome: Ongoing, Progressing  Goal: Patient-Specific Goal (Individualized)  Outcome: Ongoing, Progressing  Goal: Absence of Hospital-Acquired Illness or Injury  Outcome: Ongoing, Progressing  Goal: Optimal Comfort and Wellbeing  Outcome: Ongoing, Progressing  Goal: Readiness for Transition of Care  Outcome: Ongoing, Progressing     Problem: Fluid and Electrolyte Imbalance (Acute Kidney Injury/Impairment)  Goal: Fluid and Electrolyte Balance  Outcome: Ongoing, Progressing     Problem: Oral Intake Inadequate (Acute Kidney Injury/Impairment)  Goal: Optimal Nutrition Intake  Outcome: Ongoing, Progressing     Problem: Renal Function Impairment (Acute Kidney Injury/Impairment)  Goal: Effective Renal Function  Outcome: Ongoing, Progressing     Problem: Device-Related Complication Risk (Hemodialysis)  Goal: Safe, Effective Therapy Delivery  Outcome: Ongoing, Progressing     Problem: Hemodynamic Instability (Hemodialysis)  Goal: Effective Tissue Perfusion  Outcome: Ongoing, Progressing     Problem: Infection (Hemodialysis)  Goal: Absence of Infection Signs and Symptoms  Outcome: Ongoing, Progressing

## 2023-06-05 NOTE — NURSING
"Patient screaming help, nurse entered room to find patient removing bipap mask and yelling. Patient states, "no one has talked to me about why i'm here. I want to go to another hospital, I don't feel safe here!" Nurse attempted to explain to patient why he is here, patient continued to yell at nurse, and patient demanded to talk to a doctor. Patient yelled at this nurse stating, "you don't even know why i'm here!" Nurse again attempted to speak to patient and patient continued to demand to speak to a doctor. Nurse notified on call md, dr. Turcios. See provider notification.   "

## 2023-06-06 LAB
A-ADO2 BLOOD GAS (OHS): 614 MMHG
ALBUMIN SERPL-MCNC: 2 G/DL (ref 3.5–5)
ALBUMIN/GLOB SERPL: 0.7 RATIO (ref 1.1–2)
ALLENS TEST BLOOD GAS (OHS): ABNORMAL
ALP SERPL-CCNC: 42 UNIT/L (ref 40–150)
ALT SERPL-CCNC: 336 UNIT/L (ref 0–55)
APTT PPP: 64 SECONDS
AST SERPL-CCNC: 527 UNIT/L (ref 5–34)
BASE EXCESS BLD CALC-SCNC: 1.8 MMOL/L (ref -2–2)
BASOPHILS # BLD AUTO: 0.05 X10(3)/MCL
BASOPHILS NFR BLD AUTO: 0.3 %
BILIRUBIN DIRECT+TOT PNL SERPL-MCNC: 0.4 MG/DL
BLOOD GAS SAMPLE TYPE (OHS): ABNORMAL
BUN SERPL-MCNC: 30.6 MG/DL (ref 8.9–20.6)
CA-I ADJ PH7.4 SERPL ISE-MCNC: 3.52 MG/DL (ref 4.57–5.43)
CALCIUM SERPL-MCNC: 7.4 MG/DL (ref 8.4–10.2)
CHLORIDE SERPL-SCNC: 105 MMOL/L (ref 98–107)
CK SERPL-CCNC: ABNORMAL U/L (ref 30–200)
CO2 BLDA-SCNC: 26.8 MMOL/L (ref 22–26)
CO2 SERPL-SCNC: 25 MMOL/L (ref 22–29)
COHGB MFR BLDA: 1.5 % (ref 0.5–1.5)
CREAT SERPL-MCNC: 4.1 MG/DL (ref 0.73–1.18)
DRAWN BY BLOOD GAS (OHS): ABNORMAL
EOSINOPHIL # BLD AUTO: 0.08 X10(3)/MCL (ref 0–0.9)
EOSINOPHIL NFR BLD AUTO: 0.4 %
ERYTHROCYTE [DISTWIDTH] IN BLOOD BY AUTOMATED COUNT: 12.8 % (ref 11.5–17)
FIO2 BLOOD GAS (OHS): 100 %
GFR SERPLBLD CREATININE-BSD FMLA CKD-EPI: 19 MLS/MIN/1.73/M2
GLOBULIN SER-MCNC: 2.8 GM/DL (ref 2.4–3.5)
GLUCOSE SERPL-MCNC: 83 MG/DL (ref 74–100)
HCO3 BLDA-SCNC: 25.7 MMOL/L (ref 22–26)
HCT VFR BLD AUTO: 33.3 % (ref 42–52)
HCV RNA SERPL NAA+PROBE-ACNC: NORMAL IU/ML
HGB BLD-MCNC: 10.8 G/DL (ref 14–18)
IMM GRANULOCYTES # BLD AUTO: 0.17 X10(3)/MCL (ref 0–0.04)
IMM GRANULOCYTES NFR BLD AUTO: 0.9 %
LEVETIRACETAM SERPL-MCNC: <2 MCG/ML (ref 10–40)
LPM (OHS): 20
LYMPHOCYTES # BLD AUTO: 1.01 X10(3)/MCL (ref 0.6–4.6)
LYMPHOCYTES NFR BLD AUTO: 5.4 %
MAGNESIUM SERPL-MCNC: 1.9 MG/DL (ref 1.6–2.6)
MCH RBC QN AUTO: 29 PG (ref 27–31)
MCHC RBC AUTO-ENTMCNC: 32.4 G/DL (ref 33–36)
MCV RBC AUTO: 89.3 FL (ref 80–94)
METHGB MFR BLDA: 0.9 % (ref 0–1.5)
MONOCYTES # BLD AUTO: 1.81 X10(3)/MCL (ref 0.1–1.3)
MONOCYTES NFR BLD AUTO: 9.6 %
NEUTROPHILS # BLD AUTO: 15.73 X10(3)/MCL (ref 2.1–9.2)
NEUTROPHILS NFR BLD AUTO: 83.4 %
NRBC BLD AUTO-RTO: 0 %
O2 HB BLOOD GAS (OHS): 87.5 % (ref 94–100)
OXYGEN DEVICE BLOOD GAS (OHS): ABNORMAL
OXYHGB MFR BLDA: 11.5 G/DL (ref 12–18)
PAO2 BLOOD GAS (OHS): 667 MMHG
PCO2 BLDA: 37 MMHG (ref 35–45)
PH BLDA: 7.45 [PH] (ref 7.35–7.45)
PHOSPHATE SERPL-MCNC: 2.6 MG/DL (ref 2.3–4.7)
PLATELET # BLD AUTO: 230 X10(3)/MCL (ref 130–400)
PMV BLD AUTO: 10 FL (ref 7.4–10.4)
PO2 BLDA: 53 MMHG (ref 75–100)
POTASSIUM SERPL-SCNC: 3.6 MMOL/L (ref 3.5–5.1)
PROT SERPL-MCNC: 4.8 GM/DL (ref 6.4–8.3)
RBC # BLD AUTO: 3.73 X10(6)/MCL (ref 4.7–6.1)
SAMPLE SITE BLOOD GAS (OHS): ABNORMAL
SAO2 % BLDA: 89.7 %
SODIUM SERPL-SCNC: 139 MMOL/L (ref 136–145)
VANCOMYCIN SERPL-MCNC: 13.5 UG/ML (ref 15–20)
WBC # SPEC AUTO: 18.85 X10(3)/MCL (ref 4.5–11.5)

## 2023-06-06 PROCEDURE — 63600175 PHARM REV CODE 636 W HCPCS

## 2023-06-06 PROCEDURE — 99900035 HC TECH TIME PER 15 MIN (STAT)

## 2023-06-06 PROCEDURE — 27000923 HC TRIALYSIS CATHETER, ANY SIZE

## 2023-06-06 PROCEDURE — 85025 COMPLETE CBC W/AUTO DIFF WBC: CPT | Performed by: STUDENT IN AN ORGANIZED HEALTH CARE EDUCATION/TRAINING PROGRAM

## 2023-06-06 PROCEDURE — 83735 ASSAY OF MAGNESIUM: CPT

## 2023-06-06 PROCEDURE — 63600175 PHARM REV CODE 636 W HCPCS: Performed by: FAMILY MEDICINE

## 2023-06-06 PROCEDURE — 25000003 PHARM REV CODE 250: Performed by: NURSE PRACTITIONER

## 2023-06-06 PROCEDURE — 94761 N-INVAS EAR/PLS OXIMETRY MLT: CPT

## 2023-06-06 PROCEDURE — 80202 ASSAY OF VANCOMYCIN: CPT | Performed by: STUDENT IN AN ORGANIZED HEALTH CARE EDUCATION/TRAINING PROGRAM

## 2023-06-06 PROCEDURE — 25000003 PHARM REV CODE 250

## 2023-06-06 PROCEDURE — 80053 COMPREHEN METABOLIC PANEL: CPT

## 2023-06-06 PROCEDURE — 63600175 PHARM REV CODE 636 W HCPCS: Performed by: STUDENT IN AN ORGANIZED HEALTH CARE EDUCATION/TRAINING PROGRAM

## 2023-06-06 PROCEDURE — 25000003 PHARM REV CODE 250: Performed by: FAMILY MEDICINE

## 2023-06-06 PROCEDURE — 85730 THROMBOPLASTIN TIME PARTIAL: CPT | Performed by: STUDENT IN AN ORGANIZED HEALTH CARE EDUCATION/TRAINING PROGRAM

## 2023-06-06 PROCEDURE — 25000003 PHARM REV CODE 250: Performed by: STUDENT IN AN ORGANIZED HEALTH CARE EDUCATION/TRAINING PROGRAM

## 2023-06-06 PROCEDURE — 20000000 HC ICU ROOM

## 2023-06-06 PROCEDURE — 82550 ASSAY OF CK (CPK): CPT | Performed by: STUDENT IN AN ORGANIZED HEALTH CARE EDUCATION/TRAINING PROGRAM

## 2023-06-06 PROCEDURE — 37799 UNLISTED PX VASCULAR SURGERY: CPT

## 2023-06-06 PROCEDURE — 84100 ASSAY OF PHOSPHORUS: CPT

## 2023-06-06 PROCEDURE — 82803 BLOOD GASES ANY COMBINATION: CPT

## 2023-06-06 PROCEDURE — 27000221 HC OXYGEN, UP TO 24 HOURS

## 2023-06-06 PROCEDURE — 94660 CPAP INITIATION&MGMT: CPT

## 2023-06-06 PROCEDURE — 63600175 PHARM REV CODE 636 W HCPCS: Performed by: INTERNAL MEDICINE

## 2023-06-06 RX ORDER — TRAMADOL HYDROCHLORIDE 50 MG/1
50 TABLET ORAL ONCE
Status: COMPLETED | OUTPATIENT
Start: 2023-06-06 | End: 2023-06-06

## 2023-06-06 RX ORDER — DEXMEDETOMIDINE HYDROCHLORIDE 4 UG/ML
0-1.4 INJECTION, SOLUTION INTRAVENOUS CONTINUOUS
Status: DISCONTINUED | OUTPATIENT
Start: 2023-06-06 | End: 2023-06-08

## 2023-06-06 RX ORDER — MORPHINE SULFATE 2 MG/ML
1 INJECTION, SOLUTION INTRAMUSCULAR; INTRAVENOUS ONCE
Status: COMPLETED | OUTPATIENT
Start: 2023-06-06 | End: 2023-06-06

## 2023-06-06 RX ADMIN — PIPERACILLIN AND TAZOBACTAM 4.5 G: 4; .5 INJECTION, POWDER, LYOPHILIZED, FOR SOLUTION INTRAVENOUS; PARENTERAL at 05:06

## 2023-06-06 RX ADMIN — HYDROMORPHONE HYDROCHLORIDE 1 MG: 1 INJECTION, SOLUTION INTRAMUSCULAR; INTRAVENOUS; SUBCUTANEOUS at 03:06

## 2023-06-06 RX ADMIN — LIDOCAINE PATCH 5% 1 PATCH: 700 PATCH TOPICAL at 04:06

## 2023-06-06 RX ADMIN — HYDROMORPHONE HYDROCHLORIDE 1 MG: 1 INJECTION, SOLUTION INTRAMUSCULAR; INTRAVENOUS; SUBCUTANEOUS at 08:06

## 2023-06-06 RX ADMIN — DEXMEDETOMIDINE HYDROCHLORIDE 1.4 MCG/KG/HR: 400 INJECTION INTRAVENOUS at 12:06

## 2023-06-06 RX ADMIN — SODIUM CHLORIDE 100 MG: 0.9 INJECTION, SOLUTION INTRAVENOUS at 07:06

## 2023-06-06 RX ADMIN — SODIUM CHLORIDE: 9 INJECTION, SOLUTION INTRAVENOUS at 05:06

## 2023-06-06 RX ADMIN — DEXMEDETOMIDINE HYDROCHLORIDE 1.4 MCG/KG/HR: 400 INJECTION INTRAVENOUS at 11:06

## 2023-06-06 RX ADMIN — LEVETIRACETAM 500 MG: 5 INJECTION INTRAVENOUS at 08:06

## 2023-06-06 RX ADMIN — DEXMEDETOMIDINE HYDROCHLORIDE 1.4 MCG/KG/HR: 400 INJECTION INTRAVENOUS at 07:06

## 2023-06-06 RX ADMIN — HEPARIN SODIUM 25 UNITS/KG/HR: 10000 INJECTION, SOLUTION INTRAVENOUS at 09:06

## 2023-06-06 RX ADMIN — DEXMEDETOMIDINE HYDROCHLORIDE 1.4 MCG/KG/HR: 400 INJECTION INTRAVENOUS at 03:06

## 2023-06-06 RX ADMIN — MUPIROCIN: 20 OINTMENT TOPICAL at 08:06

## 2023-06-06 RX ADMIN — DEXMEDETOMIDINE HYDROCHLORIDE 1.4 MCG/KG/HR: 400 INJECTION INTRAVENOUS at 05:06

## 2023-06-06 RX ADMIN — DEXMEDETOMIDINE HYDROCHLORIDE 1.4 MCG/KG/HR: 400 INJECTION INTRAVENOUS at 02:06

## 2023-06-06 RX ADMIN — SODIUM CHLORIDE: 9 INJECTION, SOLUTION INTRAVENOUS at 06:06

## 2023-06-06 RX ADMIN — DEXTROSE MONOHYDRATE 100 MG: 50 INJECTION, SOLUTION INTRAVENOUS at 06:06

## 2023-06-06 RX ADMIN — VANCOMYCIN HYDROCHLORIDE 500 MG: 500 INJECTION, POWDER, LYOPHILIZED, FOR SOLUTION INTRAVENOUS at 12:06

## 2023-06-06 RX ADMIN — MORPHINE SULFATE 1 MG: 2 INJECTION, SOLUTION INTRAMUSCULAR; INTRAVENOUS at 10:06

## 2023-06-06 RX ADMIN — TRAMADOL HYDROCHLORIDE 50 MG: 50 TABLET, COATED ORAL at 07:06

## 2023-06-06 RX ADMIN — HYDROMORPHONE HYDROCHLORIDE 1 MG: 1 INJECTION, SOLUTION INTRAMUSCULAR; INTRAVENOUS; SUBCUTANEOUS at 02:06

## 2023-06-06 NOTE — PLAN OF CARE
Problem: Infection  Goal: Absence of Infection Signs and Symptoms  Outcome: Ongoing, Progressing     Problem: Adult Inpatient Plan of Care  Goal: Plan of Care Review  Outcome: Ongoing, Progressing  Goal: Patient-Specific Goal (Individualized)  Outcome: Ongoing, Progressing  Goal: Absence of Hospital-Acquired Illness or Injury  Outcome: Ongoing, Progressing  Goal: Optimal Comfort and Wellbeing  Outcome: Ongoing, Progressing  Goal: Readiness for Transition of Care  Outcome: Ongoing, Progressing     Problem: Fluid and Electrolyte Imbalance (Acute Kidney Injury/Impairment)  Goal: Fluid and Electrolyte Balance  Outcome: Ongoing, Progressing     Problem: Oral Intake Inadequate (Acute Kidney Injury/Impairment)  Goal: Optimal Nutrition Intake  Outcome: Ongoing, Progressing     Problem: Renal Function Impairment (Acute Kidney Injury/Impairment)  Goal: Effective Renal Function  Outcome: Ongoing, Progressing     Problem: Device-Related Complication Risk (Hemodialysis)  Goal: Safe, Effective Therapy Delivery  Outcome: Ongoing, Progressing     Problem: Hemodynamic Instability (Hemodialysis)  Goal: Effective Tissue Perfusion  Outcome: Ongoing, Progressing     Problem: Infection (Hemodialysis)  Goal: Absence of Infection Signs and Symptoms  Outcome: Ongoing, Progressing     Problem: Skin Injury Risk Increased  Goal: Skin Health and Integrity  Outcome: Ongoing, Progressing

## 2023-06-06 NOTE — PROGRESS NOTES
[facility] - Emergency Dept    Progress Note      Patient Name: Ryan Wild  MRN: [id number]  Admission Date: 6/2/2023  Attending Physician: Celso Busby MD   Primary Care Provider: Adilene Dillon NP    Patient information was obtained from patient and ER records.     Subjective:     Principal Problem:Non-traumatic rhabdomyolysis    Chief Complaint:   Chief Complaint   Patient presents with    Back Pain    Hypotension    Hearing Problem     PT REPORTS WAKING UP W LOWER BACK PAIN, WEAKNESS, SOB AND HEARING LOSS. 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  DENIES DRUG USE. HX OF SEIZURES, NON COMPLIANT W MEDS > 1 MONTH.  .  EKG OBTAINED.         HPI: Ryan Wild is a 32 y.o. male with history of seizures (off Keppra), drug use, hepatitis C (treated), and solitary kidney who presented to the ED on 6/2/2023  with a primary complaint of back pain. Patient is a  who had worked a 12 hour shift out in the sun the day prior. States that he went to bed feeling great, without any complaints. Then he woke up late for work day of admission and admits to not remembering much of what happened afterwards. His coworkers brought him to the ED. Admits to severe back pain that is aggravated by movement, weakness, shortness of breath. Also has not urinated all day. Denies chest pain, palpitations, headache, nausea, vomiting, abdominal pain, fevers. Last IV drug use 1 year ago, but snorted meth 2 days ago. Also uses IM steroids, last injection last night. Has not taken his home Keppra for the past few months; his last seizure was on 8/2022.      In the ED, patient presented hypotensive, tachycardic, SpO2 89% on RA. He was placed on bipap. Vasopressors were started when patient continued to be hypotensive with IVF. Labs significant for WBC 42.1, K 6.9, CO2 14, Cr 3.77, , . CPK 37k. LDH 2,2k. CBG normal. Troponin 1.878, EKG  no ischemic changes. Lactic 8.0. CXR  with increased interstitial markings in the right greater than left lung. Bedside Echo no abnormalities. Pt was given Vanc/Zosyn x1, and 4L bolus NS total. Medicine was called to admit patient for septic shock with multiorgan dysfunction.    Hospital course:  06/03/2023: Patient started on HD emergently, Levophed, Precedex, BiPAP, good response to Lasix  06/04/2023:  Recommend repeat HD today, nephro to see, off Levophed, continue Precedex, BiPAP, consider proning after dialysis, if no dialysis planned we will give repeat dose of Lasix.  6.5.23: No acute events overnight, patient remains on Precedex at max. Concerned about his status and why he is still in-house. Unable to tolerate vapotherm or proning for any appreciable amount of time. Currently being dialyzed.     Interval history:  NAEON. HD yesterday. Removed 0.5-1L ultrafiltrate. Well tolerated.  Able to wean off BIPAP to CPAP. HFNC at supper time and able to tolerate small meal. Back on CPAP at night. Still requiring sedation with Precedex. Currently on Vapotherm at 100% O2.    Past Medical History:   Diagnosis Date    Depression     Opioid abuse     Seizures     Solitary kidney, congenital     Unspecified viral hepatitis C without hepatic coma        Past Surgical History:   Procedure Laterality Date    TONSILLECTOMY         Review of patient's allergies indicates:  No Known Allergies    No current facility-administered medications on file prior to encounter.     Current Outpatient Medications on File Prior to Encounter   Medication Sig    albuterol (PROVENTIL/VENTOLIN HFA) 90 mcg/actuation inhaler INHALE 2 PUFFS BY MOUTH EVERY 6 HOURS AS NEEDED FOR SHORTNESS OF BREATH OR WHEEZING    fluticasone propionate (FLONASE) 50 mcg/actuation nasal spray SHAKE LIQUID AND USE 2 SPRAYS IN EACH NOSTRIL DAILY    levETIRAcetam (KEPPRA) 500 MG Tab Take 1 tablet by mouth 2 (two) times daily.    QUEtiapine (SEROQUEL) 100 MG Tab Take 1  tablet (100 mg total) by mouth nightly.    valACYclovir (VALTREX) 1000 MG tablet Take 1,000 mg by mouth 3 (three) times daily.    venlafaxine (EFFEXOR-XR) 37.5 MG 24 hr capsule Take 37.5 mg by mouth every morning.     Family History       Problem Relation (Age of Onset)    Cerebral aneurysm Father          Tobacco Use    Smoking status: Every Day     Types: Vaping with nicotine    Smokeless tobacco: Current   Substance and Sexual Activity    Alcohol use: Not Currently    Drug use: Not Currently     Types: Heroin, Methamphetamines     Comment: 3 years sober    Sexual activity: Yes     Partners: Female     Review of Systems   Constitutional:  Negative for chills and fever.   Gastrointestinal:  Negative for abdominal pain, nausea and vomiting.   Genitourinary:  Negative for difficulty urinating.   Musculoskeletal:  Positive for back pain.   Neurological:  Negative for headaches.   Psychiatric/Behavioral:  Negative for hallucinations. The patient is nervous/anxious.    Objective:     Vital Signs (Most Recent):  Temp: 98.6 °F (37 °C) (06/06/23 0000)  Pulse: 74 (06/06/23 0415)  Resp: 17 (06/06/23 0415)  BP: 138/87 (06/06/23 0400)  SpO2: (!) 93 % (06/06/23 0415) Vital Signs (24h Range):  Temp:  [97.8 °F (36.6 °C)-99 °F (37.2 °C)] 98.6 °F (37 °C)  Pulse:  [71-88] 74  Resp:  [15-28] 17  SpO2:  [91 %-100 %] 93 %  BP: (130-168)/() 138/87  Arterial Line BP: (130-174)/(71-98) 169/87     Weight: 69.4 kg (153 lb)  Body mass index is 23.96 kg/m².    Physical Exam  Constitutional:       General: He is awake. He is in acute distress.      Appearance: Normal appearance. He is normal weight. He is ill-appearing and diaphoretic.      Comments: Lying in bed on CPAP.    Cardiovascular:      Rate and Rhythm: Normal rate and regular rhythm.      Pulses: Normal pulses.           Dorsalis pedis pulses are 2+ on the right side and 2+ on the left side.      Heart sounds: Normal heart sounds.      Arteriovenous access: Right and left  arteriovenous access is present.  Pulmonary:      Effort: Pulmonary effort is normal.      Breath sounds: Normal air entry. Wheezing present.   Abdominal:      General: Abdomen is flat. Bowel sounds are normal.      Palpations: Abdomen is soft.      Tenderness: There is no abdominal tenderness. There is no guarding.   Musculoskeletal:      Lumbar back: Deformity and tenderness present. No signs of trauma or lacerations.      Right lower leg: No edema.      Left lower leg: No edema.      Comments: Significant decrease in swelling and bilateral lumbar firmness. New Lidocaine patch in place.   Feet:      Right foot:      Skin integrity: Skin integrity normal.      Left foot:      Skin integrity: Skin integrity normal.   Skin:     General: Skin is warm.      Capillary Refill: Capillary refill takes 2 to 3 seconds.      Coloration: Skin is not cyanotic, jaundiced or mottled.   Neurological:      Mental Status: He is alert and oriented to person, place, and time.      GCS: GCS eye subscore is 4. GCS verbal subscore is 5. GCS motor subscore is 6.   Psychiatric:         Behavior: Behavior is cooperative.          Significant Labs: All pertinent labs within the past 24 hours have been reviewed.    Significant Imaging: I have reviewed all pertinent imaging results/findings within the past 24 hours.    Assessment/Plan:   Methamphetamine/fentanyl overdose  Acute toxic metabolic encephalopathy secondary to above  Acute renal failure stage III secondary to rhabdo secondary to above- Improving  History of polysubstance use disorder  History of anabolic steroid abuse  Pneumonia-community-acquired with possible component of aspiration  Sepsis secondary to above- Improving  Leukocytosis-improving  Hyperkalemia-Resolving with dialysis  Severe fluid overload- Improving  NSTEMI possibly type 2 however given high peak concern for type 1  Shock- Resolved      - Continue ICU level of care, Precedex for compliance with NIPAP. Believe  Precedex can be weaned.  -Echo 50%EF. Positive intracardiac shunt.  -Repeat CXR shows no significant change 6.3.23  - Off levophed. Hypertensive (). Goal map 75-80 given significant KINGSLEY  -fluids per Nephrology on NS at 100 cc/hr.  -nephrology following, appreciate recommendations.   - right IJ dialysis cath placed by surgery  - Continue Keppra 500mg IV. Keppra level pending.   -continue heparin for NSTEMI. EKG with new TWI in inferior lateral leads  - continue vanc/Zosyn/doxy with renal dosing day 4. Vanc held. Restart when appropriate.      Dillon Dominguez MD  Department of Hospital Medicine

## 2023-06-06 NOTE — PROGRESS NOTES
Pharmacokinetic Assessment Follow Up: IV Vancomycin    Vancomycin serum concentration assessment(s):    The random level was drawn correctly and can be used to guide therapy at this time. The measurement is below the desired definitive target range of 15 to 20 mcg/mL.    Vancomycin Regimen Plan:    Vancomycin 500mg once pulse dose (patient has one kidney) will draw random level on 6/7/23 with morning labs     Drug levels (last 3 results):  Recent Labs   Lab Result Units 06/03/23  1600 06/04/23  1446 06/06/23  0333   Vanc Lvl Random ug/ml 9.0* 21.2* 13.5*       Pharmacy will continue to follow and monitor vancomycin.    Please contact pharmacy at extension [phone] for questions regarding this assessment.    Thank you for the consult,   Marva Kraus       Patient brief summary:  Ryan Wild is a 32 y.o. male initiated on antimicrobial therapy with IV Vancomycin for treatment of sepsis    The patient's current regimen is 500mg once    Drug Allergies:   Review of patient's allergies indicates:  No Known Allergies    Actual Body Weight:   69.4kg    Renal Function:   Estimated Creatinine Clearance: 24 mL/min (A) (based on SCr of 4.1 mg/dL (H)).,     Dialysis Method (if applicable):  N/A    CBC (last 72 hours):  Recent Labs   Lab Result Units 06/04/23  0453 06/05/23 0407 06/06/23  0333   WBC x10(3)/mcL 20.24* 22.58* 18.85*   Hgb g/dL 11.3* 11.5* 10.8*   Hct % 34.8* 35.8* 33.3*   Platelet x10(3)/mcL 189 215 230   Mono % % 7.1 7.0 9.6   Eos % % 0.0 0.2 0.4   Basophil % % 0.1 0.2 0.3       Metabolic Panel (last 72 hours):  Recent Labs   Lab Result Units 06/03/23  1600 06/03/23  1928 06/04/23  0453 06/05/23  0407 06/06/23  0333   Sodium Level mmol/L 132* 132* 134* 138 139   Potassium Level mmol/L 4.4 4.9 4.7 4.7 3.6   Chloride mmol/L 99 99 101 106 105   Carbon Dioxide mmol/L 24 24 23 20* 25   Glucose Level mg/dL 81 78 80 75 83   Blood Urea Nitrogen mg/dL 22.7* 26.5* 34.3* 46.7* 30.6*   Creatinine mg/dL 2.42* 2.68* 3.59*  4.75* 4.10*   Albumin Level g/dL  --   --  2.5* 2.4* 2.0*   Bilirubin Total mg/dL  --   --  0.4 0.4 0.4   Alkaline Phosphatase unit/L  --   --  34* 55 42   Aspartate Aminotransferase unit/L  --   --  797* 766* 527*   Alanine Aminotransferase unit/L  --   --  446* 434* 336*   Magnesium Level mg/dL  --   --  2.20 2.10 1.90   Phosphorus Level mg/dL  --   --  4.2 4.1 2.6       Vancomycin Administrations:  vancomycin given in the last 96 hours                     vancomycin 750 mg in sodium chloride 0.9% 250 mL IVPB (mg) 750 mg New Bag 06/04/23 2308    vancomycin (VANCOCIN) 1,250 mg in sodium chloride 0.9% 250 mL IVPB (mg) 1,250 mg New Bag 06/03/23 1746    vancomycin (VANCOCIN) 1,750 mg in sodium chloride 0.9% 500 mL IVPB (mg) 1,750 mg New Bag 06/02/23 1428                    Microbiologic Results:  Microbiology Results (last 7 days)       Procedure Component Value Units Date/Time    Blood culture x two cultures. Draw prior to antibiotics. [[id number]]  (Normal) Collected: 06/02/23 1419    Order Status: Completed Specimen: Blood from Arm, Left Updated: 06/05/23 1901     CULTURE, BLOOD ([facility]) No Growth At 72 Hours    Blood culture x two cultures. Draw prior to antibiotics. [[id number]]  (Normal) Collected: 06/02/23 1436    Order Status: Completed Specimen: Blood from Hand, Left Updated: 06/05/23 1901     CULTURE, BLOOD ([facility]) No Growth At 72 Hours    Urine culture [[id number]] Collected: 06/02/23 1900    Order Status: Completed Specimen: Urine, Catheterized Updated: 06/05/23 0957     Urine Culture No Growth

## 2023-06-06 NOTE — PROGRESS NOTES
"Nephrology Progress Note    Hospital course:   32-year-old  male who remains in intensive care unit with hypertension and acute renal failure.  Patient with severe rhabdomyolysis upon admission.  Patient also had hypotension.  Renal service consult for evaluation management of acute renal failure.  Patient ended up requiring hemodialysis both for clearance and ultrafiltration purposes.    Subjective:  Patient doing well this morning no complaints    Objective:   /76   Pulse 80   Temp 98.4 °F (36.9 °C)   Resp (!) 27   Ht 5' 7" (1.702 m)   Wt 69.4 kg (153 lb)   SpO2 (!) 89%   BMI 23.96 kg/m²     Intake/Output Summary (Last 24 hours) at 6/6/2023 1051  Last data filed at 6/6/2023 0602  Gross per 24 hour   Intake 4352.38 ml   Output 2445 ml   Net 1907.38 ml        Physical exam:   General:  Patient somnolent but arousable.  No acute distress  HEENT normocephalic atraumatic  Neck no JVD bruit thyromegaly adenopathy appreciated  Lungs:  Clear to auscultation bilaterally all fields  Cardiovascular regular rate and rhythm  Abdomen:  Positive bowel sounds all 4 quadrants soft nontender nondistended  Extremities:  Trace edema of all extremities bilaterally  Neurologic:  Cranial nerves 2-12 appear to be grossly intact no clonus asterixis observed    Laboratory data:   Recent Results (from the past 24 hour(s))   Echo Saline Bubble? Yes    Collection Time: 06/05/23 12:32 PM   Result Value Ref Range    BSA 1.81 m2    TDI SEPTAL 0.12 m/s    LV LATERAL E/E' RATIO 7.75 m/s    LV SEPTAL E/E' RATIO 7.75 m/s    Right Atrial Pressure (from IVC) 15 mmHg    EF 50 %    Left Ventricular Outflow Tract Mean Velocity 0.71 cm/s    Left Ventricular Outflow Tract Mean Gradient 2.41 mmHg    TDI LATERAL 0.12 m/s    LVIDd 4.59 3.5 - 6.0 cm    IVS 0.75 0.6 - 1.1 cm    Posterior Wall 0.75 0.6 - 1.1 cm    LVIDs 3.37 2.1 - 4.0 cm    FS 27 28 - 44 %    LV mass 108.06 g    LA size 3.25 cm    RVDD 1.96 cm    Left Ventricle Relative " Wall Thickness 0.33 cm    AV mean gradient 4 mmHg    AV valve area 3.30 cm2    AV Velocity Ratio 0.98     AV index (prosthetic) 0.95     MV mean gradient 1 mmHg    MV valve area p 1/2 method 3.75 cm2    MV valve area by continuity eq 2.99 cm2    E/A ratio 1.63     Mean e' 0.12 m/s    E wave deceleration time 202.50 msec    LVOT diameter 2.10 cm    LVOT area 3.5 cm2    LVOT peak rajesh 1.13 m/s    LVOT peak VTI 20.10 cm    Ao peak rajesh 1.15 m/s    Ao VTI 21.1 cm    LVOT stroke volume 69.58 cm3    AV peak gradient 5 mmHg    MV peak gradient 4 mmHg    TV rest pulmonary artery pressure 20 mmHg    E/E' ratio 7.75 m/s    MV Peak E Rajesh 0.93 m/s    TR Max Rajesh 1.17 m/s    MV VTI 23.3 cm    MV stenosis pressure 1/2 time 58.72 ms    MV Peak A Rajesh 0.57 m/s    LV Systolic Volume 46.46 mL    LV Systolic Volume Index 25.8 mL/m2    LV Diastolic Volume 96.71 mL    LV Diastolic Volume Index 53.73 mL/m2    LV Mass Index 60 g/m2    Triscuspid Valve Regurgitation Peak Gradient 5 mmHg    Wild's Biplane MOD Ejection Fraction 6 %   APTT    Collection Time: 06/05/23  1:49 PM   Result Value Ref Range    PTT 59.2 <150.0 seconds   Blood Gas (ABG)    Collection Time: 06/05/23  3:12 PM   Result Value Ref Range    Sample Type Arterial Blood     Sample site Arterial Line     Drawn by BTS     pH, Blood gas 7.440 7.350 - 7.450    pCO2, Blood gas 40.0 35.0 - 45.0 mmHg    pO2, Blood gas 117.0 (H) 75.0 - 100.0 mmHg    TOC2, Blood gas 28.4 (H) 22.0 - 26.0 mmol/L    Base Excess, Blood gas 2.80 (H) -2.00 - 2.00 mmol/L    sO2, Blood gas 99.1 >=90.0 %    HCO3, Blood gas 27.2 (H) 22.0 - 26.0 mmol/L    pAO2 663 mmHg    A-aDO2 546 mmHg    THb, Blood gas 11.4 (L) 12 - 18 g/dL    O2 Hb, Blood Gas 97.2 94.0 - 100.0 %    CO Hgb 1.1 0.5 - 1.5 %    Met Hgb 0.9 0 - 1.5 %    Allens Test N/A     MODE CPAP     FIO2, Blood gas 100.0 %    CPAP 10 cm H2O   APTT    Collection Time: 06/05/23  8:46 PM   Result Value Ref Range    PTT 66.4 <150.0 seconds   Comprehensive  Metabolic Panel (CMP)    Collection Time: 06/06/23  3:33 AM   Result Value Ref Range    Sodium Level 139 136 - 145 mmol/L    Potassium Level 3.6 3.5 - 5.1 mmol/L    Chloride 105 98 - 107 mmol/L    Carbon Dioxide 25 22 - 29 mmol/L    Glucose Level 83 74 - 100 mg/dL    Blood Urea Nitrogen 30.6 (H) 8.9 - 20.6 mg/dL    Creatinine 4.10 (H) 0.73 - 1.18 mg/dL    Calcium Level Total 7.4 (L) 8.4 - 10.2 mg/dL    Protein Total 4.8 (L) 6.4 - 8.3 gm/dL    Albumin Level 2.0 (L) 3.5 - 5.0 g/dL    Globulin 2.8 2.4 - 3.5 gm/dL    Albumin/Globulin Ratio 0.7 (L) 1.1 - 2.0 ratio    Bilirubin Total 0.4 <=1.5 mg/dL    Alkaline Phosphatase 42 40 - 150 unit/L    Alanine Aminotransferase 336 (H) 0 - 55 unit/L    Aspartate Aminotransferase 527 (H) 5 - 34 unit/L    eGFR 19 mls/min/1.73/m2   Magnesium    Collection Time: 06/06/23  3:33 AM   Result Value Ref Range    Magnesium Level 1.90 1.60 - 2.60 mg/dL   Phosphorus    Collection Time: 06/06/23  3:33 AM   Result Value Ref Range    Phosphorus Level 2.6 2.3 - 4.7 mg/dL   Vancomycin, Random    Collection Time: 06/06/23  3:33 AM   Result Value Ref Range    Vanc Lvl Random 13.5 (L) 15.0 - 20.0 ug/ml   APTT    Collection Time: 06/06/23  3:33 AM   Result Value Ref Range    PTT 64.0 <150.0 seconds   CBC with Differential    Collection Time: 06/06/23  3:33 AM   Result Value Ref Range    WBC 18.85 (H) 4.50 - 11.50 x10(3)/mcL    RBC 3.73 (L) 4.70 - 6.10 x10(6)/mcL    Hgb 10.8 (L) 14.0 - 18.0 g/dL    Hct 33.3 (L) 42.0 - 52.0 %    MCV 89.3 80.0 - 94.0 fL    MCH 29.0 27.0 - 31.0 pg    MCHC 32.4 (L) 33.0 - 36.0 g/dL    RDW 12.8 11.5 - 17.0 %    Platelet 230 130 - 400 x10(3)/mcL    MPV 10.0 7.4 - 10.4 fL    Neut % 83.4 %    Lymph % 5.4 %    Mono % 9.6 %    Eos % 0.4 %    Basophil % 0.3 %    Lymph # 1.01 0.6 - 4.6 x10(3)/mcL    Neut # 15.73 (H) 2.1 - 9.2 x10(3)/mcL    Mono # 1.81 (H) 0.1 - 1.3 x10(3)/mcL    Eos # 0.08 0 - 0.9 x10(3)/mcL    Baso # 0.05 <=0.2 x10(3)/mcL    IG# 0.17 (H) 0 - 0.04 x10(3)/mcL     IG% 0.9 %    NRBC% 0.0 %   CK    Collection Time: 06/06/23  3:33 AM   Result Value Ref Range    Creatine Kinase 11,540 (H) 30 - 200 U/L   Blood Gas (ABG)    Collection Time: 06/06/23  7:13 AM   Result Value Ref Range    Sample Type Arterial Blood     Sample site Arterial Line     Drawn by BTS     pH, Blood gas 7.450 7.350 - 7.450    pCO2, Blood gas 37.0 35.0 - 45.0 mmHg    pO2, Blood gas 53.0 (LL) 75.0 - 100.0 mmHg    TOC2, Blood gas 26.8 (H) 22.0 - 26.0 mmol/L    Base Excess, Blood gas 1.80 -2.00 - 2.00 mmol/L    sO2, Blood gas 89.7 (L) >=90.0 %    HCO3, Blood gas 25.7 22.0 - 26.0 mmol/L    pAO2 667 mmHg    A-aDO2 614 mmHg    THb, Blood gas 11.5 (L) 12 - 18 g/dL    O2 Hb, Blood Gas 87.5 (L) 94.0 - 100.0 %    CO Hgb 1.5 0.5 - 1.5 %    Met Hgb 0.9 0 - 1.5 %    Allens Test N/A     Oxygen Device, Blood gas High Flow Cannula     LPM 20.0     FIO2, Blood gas 100.0 %       Imaging:   Imaging Results              CT Chest Abdomen Pelvis Without Contrast (XPD) (Final result)  Result time 06/02/23 18:40:12      Final result by Arley Cuevas MD (06/02/23 18:40:12)                   Impression:      1. Multifocal lung consolidation, likely pneumonia.  2. Small volume ascites with nonspecific gallbladder wall thickening.  Some of the ascitic fluid in the pelvis appears complex.  3. Mild hepatomegaly.      Electronically signed by: Arley Cuevas  Date:    06/02/2023  Time:    18:40               Narrative:    EXAMINATION:  CT CHEST ABDOMEN PELVIS WITHOUT CONTRAST(XPD)    CLINICAL HISTORY:  Sepsis; Sepsis, unspecified organism    TECHNIQUE:  CT imaging from the chest through the pelvis without IV contrast. Axial, coronal and sagittal reformatted images reviewed. Dose length product 263 mGycm. Automatic exposure control, adjustment of mA/kV or iterative reconstruction technique used to limit radiation dose.    COMPARISON:  No relevant comparison studies available at the time of dictation.    FINDINGS:  Assessment of the  visceral organs and vasculature is limited by the lack of IV contrast.    Lung and large airways: Multifocal consolidation in both lungs, greatest in the lower lobes.    Pleura: No significant pleural fluid.    Mediastinum and nae: Normal heart size.  No pathologically enlarged lymph node identified.    Chest wall/axilla and lower neck: No significant findings.    Liver/biliary: Mild hepatomegaly.  Suspected gallbladder wall thickening.  No biliary dilatation.    Pancreas: Normal.    Spleen: Normal.    Adrenals: Normal.    Genitourinary: Right kidney not identified.  No urinary stone or hydronephrosis on the left.  Normal bladder.    Stomach/bowel: No bowel obstruction.    Abdominal/pelvic lymph nodes and peritoneum: No pathologically enlarged lymph node identified. Small volume ascites with increased fluid density in portions of the pelvis.    Abdominal and pelvic vasculature: Normal abdominal aortic caliber.    Abdominal wall: High-riding right testicle along the right inguinal canal.    Musculoskeletal: No acute osseous findings.                                       US Retroperitoneal Limited (Final result)  Result time 06/02/23 16:31:06      Final result by Familia John MD (06/02/23 16:31:06)                   Narrative:    EXAMINATION  US RETROPERITONEAL LIMITED    CLINICAL HISTORY  lalita;    TECHNIQUE  Multiplanar grayscale sonographic images were obtained of the retroperitoneum.    COMPARISON  None available at the time of initial interpretation.    FINDINGS  Exam quality: adequate for evaluation    Kidneys: The right kidney is not visualized, with no focal or otherwise suspicious findings of the ipsilateral retroperitoneal region.  The left kidney measures 14.1 cm x 7.2 cm x 6.3 cm.  The left renal parenchyma is homogeneous and normal by sonographic appearance, with smooth marginated cortex.  No masses or complex cysts are appreciated.  No collecting system dilatation.  No shadowing calcification is  identified.  No perinephric collection or free abdominal fluid.    Miscellaneous: There is incidentally appreciated fluid partially visualized through the left abdominal cavity.  Scattered internal punctate echogenic foci may represent associated debris.    IMPRESSION  1. Solitary left kidney, without sonographic findings of acute urologic abnormality.  2. Partially visualized fluid with echogenic debris at the left hemiabdomen is nonspecific; differential includes complex ascites or collection, versus distended stomach/bowel.      Electronically signed by: Familia John  Date:    06/02/2023  Time:    16:31                                     X-Ray Chest AP Portable (Final result)  Result time 06/02/23 13:48:55      Final result by Franco Gonzáles MD (06/02/23 13:48:55)                   Impression:      Findings concerning for right mid to lower lung infectious process.      Electronically signed by: Franco Gonzáles  Date:    06/02/2023  Time:    13:48               Narrative:    EXAMINATION:  XR CHEST AP PORTABLE    CLINICAL HISTORY:  Sepsis;    TECHNIQUE:  Single view of the chest    COMPARISON:  No prior imaging available for comparison.    FINDINGS:  Increased interstitial markings in the right greater than left lung.    The cardiomediastinal silhouette is within normal limits.    No acute osseous abnormality.                                       Medications:     Current Facility-Administered Medications:     0.9%  NaCl infusion, , Intravenous, Continuous, [place], TREYP, Last Rate: 100 mL/hr at 06/06/23 0651, New Bag at 06/06/23 0651    calcium gluconate 1 g in NS IVPB (premixed), 1 g, Intravenous, PRN, Lm Yanes MD, Stopped at 06/04/23 0346    calcium gluconate 1 g in NS IVPB (premixed), 2 g, Intravenous, PRN, Lm Yanes MD    calcium gluconate 1 g in NS IVPB (premixed), 3 g, Intravenous, PRN, Lm Yanes MD    dexmedetomidine (PRECEDEX) 400mcg/100mL 0.9% NaCL infusion, 0-1.4  mcg/kg/hr, Intravenous, Continuous, Dillon Dominguez MD, Last Rate: 24.3 mL/hr at 06/06/23 0715, 1.4 mcg/kg/hr at 06/06/23 0715    dextrose 10% bolus 125 mL 125 mL, 12.5 g, Intravenous, PRN, Alda Valladares MD, Stopped at 06/02/23 2234    dextrose 40 % gel 15,000 mg, 15 g, Oral, PRN, Alda Valladares MD    dextrose 40 % gel 30,000 mg, 30 g, Oral, PRN, Alda Valladares MD    doxycycline (VIBRAMYCIN) 100 mg in sodium chloride 0.9% 250 mL IVPB, 100 mg, Intravenous, Q12H, Dominic Armijo MD, Stopped at 06/06/23 0815    glucagon (human recombinant) injection 1 mg, 1 mg, Intramuscular, PRN, Alda Valladares MD    heparin 25,000 units in dextrose 5% (100 units/ml) IV bolus from bag - ADDITIONAL PRN BOLUS - 30 units/kg (max bolus 4000 units), 30 Units/kg (Adjusted), Intravenous, PRN, Dillon Dominguez MD, 2,020 Units at 06/05/23 1450    heparin 25,000 units in dextrose 5% (100 units/ml) IV bolus from bag - ADDITIONAL PRN BOLUS - 60 units/kg (max bolus 4000 units), 59.3 Units/kg (Adjusted), Intravenous, PRN, Dillon Dominguez MD, 4,000 Units at 06/04/23 2353    heparin 25,000 units in dextrose 5% 250 mL (100 units/mL) infusion LOW INTENSITY nomogram - LAF, 0-40 Units/kg/hr (Adjusted), Intravenous, Continuous, Dillon Dominguez MD, Last Rate: 16.9 mL/hr at 06/06/23 0948, 25 Units/kg/hr at 06/06/23 0948    HYDROmorphone injection 1 mg, 1 mg, Intravenous, Q6H PRN, Dominic Armijo MD, 1 mg at 06/06/23 0835    insulin regular injection 6.94 Units 0.0694 mL, 0.1 Units/kg, Intravenous, PRN, Dominic Armijo MD, 6.94 Units at 06/02/23 2112    levETIRAcetam in NaCl (iso-os) IVPB 500 mg, 500 mg, Intravenous, Q12H, Dillon Dominguez MD, Stopped at 06/06/23 0900    LIDOcaine 5 % patch 1 patch, 1 patch, Transdermal, Q24H, Dillon Dominguez MD, 1 patch at 06/05/23 1639    mupirocin 2 % ointment, , Nasal, BID, Dewayne Torres MD, Given at 06/06/23 0830    naloxone 0.4 mg/mL injection 0.02 mg, 0.02 mg, Intravenous, PRN, Alda Valladares MD    NORepinephrine 4 mg in dextrose 5% 250 mL  infusion (premix) (titrating), 0-3 mcg/kg/min, Intravenous, Continuous, Juan Manuel Miranda MD, Paused at 06/04/23 0200    piperacillin-tazobactam (ZOSYN) 4.5 g in sodium chloride 0.9 % 100 mL IVPB (MB+), 4.5 g, Intravenous, Q12H, Dominic Armijo MD, Stopped at 06/06/23 0957    sodium chloride 0.9% flush 10 mL, 10 mL, Intravenous, Q12H PRN, Alda Valladares MD    Pharmacy to dose Vancomycin consult, , , Once **AND** vancomycin - pharmacy to dose, , Intravenous, pharmacy to manage frequency, Alda Valladares MD     Impression/Plan:  Acute nonoliguric renal failure:  Etiology is likely multifactorial to include ischemic acute tubular necrosis due to hypotension and hypoperfusion and 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  Patient also has solitary kidney.  Will continue to provide dialysis support as needed.  No indications for acute hemodialysis today.  Will reassess in a.m.  Hypertension:  Improved continue present management  Hyperphosphatemia stable and corrected.  Will recheck phosphorus level tomorrow.  Transaminitis:  Improving.  Will recheck CMP tomorrow.  Rhabdomyolysis CPK level is improving we will continue to trend will continue to provide dialysis support and IV fluid resuscitation.  Christal Forte M.D.  Nephrology

## 2023-06-07 LAB
ALBUMIN SERPL-MCNC: 1.7 G/DL (ref 3.5–5)
ALBUMIN/GLOB SERPL: 0.6 RATIO (ref 1.1–2)
ALP SERPL-CCNC: 41 UNIT/L (ref 40–150)
ALT SERPL-CCNC: 257 UNIT/L (ref 0–55)
APTT PPP: 49.4 SECONDS
AST SERPL-CCNC: 320 UNIT/L (ref 5–34)
BACTERIA BLD CULT: NORMAL
BACTERIA BLD CULT: NORMAL
BASOPHILS # BLD AUTO: 0.07 X10(3)/MCL
BASOPHILS NFR BLD AUTO: 0.3 %
BILIRUBIN DIRECT+TOT PNL SERPL-MCNC: 0.4 MG/DL
BUN SERPL-MCNC: 40 MG/DL (ref 8.9–20.6)
CALCIUM SERPL-MCNC: 7.4 MG/DL (ref 8.4–10.2)
CHLORIDE SERPL-SCNC: 106 MMOL/L (ref 98–107)
CK SERPL-CCNC: 5928 U/L (ref 30–200)
CO2 SERPL-SCNC: 21 MMOL/L (ref 22–29)
CREAT SERPL-MCNC: 4.79 MG/DL (ref 0.73–1.18)
EOSINOPHIL # BLD AUTO: 0.1 X10(3)/MCL (ref 0–0.9)
EOSINOPHIL NFR BLD AUTO: 0.5 %
ERYTHROCYTE [DISTWIDTH] IN BLOOD BY AUTOMATED COUNT: 12.5 % (ref 11.5–17)
GFR SERPLBLD CREATININE-BSD FMLA CKD-EPI: 16 MLS/MIN/1.73/M2
GLOBULIN SER-MCNC: 2.9 GM/DL (ref 2.4–3.5)
GLUCOSE SERPL-MCNC: 81 MG/DL (ref 74–100)
HCT VFR BLD AUTO: 33.8 % (ref 42–52)
HGB BLD-MCNC: 11.5 G/DL (ref 14–18)
IMM GRANULOCYTES # BLD AUTO: 0.13 X10(3)/MCL (ref 0–0.04)
IMM GRANULOCYTES NFR BLD AUTO: 0.6 %
LYMPHOCYTES # BLD AUTO: 1.11 X10(3)/MCL (ref 0.6–4.6)
LYMPHOCYTES NFR BLD AUTO: 5.4 %
MAGNESIUM SERPL-MCNC: 1.7 MG/DL (ref 1.6–2.6)
MCH RBC QN AUTO: 30 PG (ref 27–31)
MCHC RBC AUTO-ENTMCNC: 34 G/DL (ref 33–36)
MCV RBC AUTO: 88.3 FL (ref 80–94)
MONOCYTES # BLD AUTO: 2.38 X10(3)/MCL (ref 0.1–1.3)
MONOCYTES NFR BLD AUTO: 11.6 %
NEUTROPHILS # BLD AUTO: 16.68 X10(3)/MCL (ref 2.1–9.2)
NEUTROPHILS NFR BLD AUTO: 81.6 %
NRBC BLD AUTO-RTO: 0 %
PHOSPHATE SERPL-MCNC: 2.7 MG/DL (ref 2.3–4.7)
PLATELET # BLD AUTO: 245 X10(3)/MCL (ref 130–400)
PMV BLD AUTO: 10.1 FL (ref 7.4–10.4)
POTASSIUM SERPL-SCNC: 3.6 MMOL/L (ref 3.5–5.1)
PROT SERPL-MCNC: 4.6 GM/DL (ref 6.4–8.3)
RBC # BLD AUTO: 3.83 X10(6)/MCL (ref 4.7–6.1)
SODIUM SERPL-SCNC: 138 MMOL/L (ref 136–145)
VANCOMYCIN SERPL-MCNC: 14 UG/ML (ref 15–20)
WBC # SPEC AUTO: 20.47 X10(3)/MCL (ref 4.5–11.5)

## 2023-06-07 PROCEDURE — 80053 COMPREHEN METABOLIC PANEL: CPT

## 2023-06-07 PROCEDURE — 20000000 HC ICU ROOM

## 2023-06-07 PROCEDURE — 80100014 HC HEMODIALYSIS 1:1

## 2023-06-07 PROCEDURE — 85025 COMPLETE CBC W/AUTO DIFF WBC: CPT | Performed by: STUDENT IN AN ORGANIZED HEALTH CARE EDUCATION/TRAINING PROGRAM

## 2023-06-07 PROCEDURE — 25000003 PHARM REV CODE 250: Performed by: FAMILY MEDICINE

## 2023-06-07 PROCEDURE — 25000003 PHARM REV CODE 250: Performed by: STUDENT IN AN ORGANIZED HEALTH CARE EDUCATION/TRAINING PROGRAM

## 2023-06-07 PROCEDURE — 80202 ASSAY OF VANCOMYCIN: CPT

## 2023-06-07 PROCEDURE — 63600175 PHARM REV CODE 636 W HCPCS: Performed by: FAMILY MEDICINE

## 2023-06-07 PROCEDURE — 63600175 PHARM REV CODE 636 W HCPCS: Performed by: STUDENT IN AN ORGANIZED HEALTH CARE EDUCATION/TRAINING PROGRAM

## 2023-06-07 PROCEDURE — 25000003 PHARM REV CODE 250: Performed by: NURSE PRACTITIONER

## 2023-06-07 PROCEDURE — 94799 UNLISTED PULMONARY SVC/PX: CPT

## 2023-06-07 PROCEDURE — 84100 ASSAY OF PHOSPHORUS: CPT

## 2023-06-07 PROCEDURE — 25000003 PHARM REV CODE 250: Performed by: INTERNAL MEDICINE

## 2023-06-07 PROCEDURE — 94761 N-INVAS EAR/PLS OXIMETRY MLT: CPT

## 2023-06-07 PROCEDURE — 83735 ASSAY OF MAGNESIUM: CPT

## 2023-06-07 PROCEDURE — 99900035 HC TECH TIME PER 15 MIN (STAT)

## 2023-06-07 PROCEDURE — 63600175 PHARM REV CODE 636 W HCPCS: Performed by: INTERNAL MEDICINE

## 2023-06-07 PROCEDURE — 85730 THROMBOPLASTIN TIME PARTIAL: CPT | Performed by: STUDENT IN AN ORGANIZED HEALTH CARE EDUCATION/TRAINING PROGRAM

## 2023-06-07 PROCEDURE — 94660 CPAP INITIATION&MGMT: CPT

## 2023-06-07 PROCEDURE — 99233 SBSQ HOSP IP/OBS HIGH 50: CPT | Mod: ,,, | Performed by: INTERNAL MEDICINE

## 2023-06-07 PROCEDURE — 82550 ASSAY OF CK (CPK): CPT | Performed by: STUDENT IN AN ORGANIZED HEALTH CARE EDUCATION/TRAINING PROGRAM

## 2023-06-07 PROCEDURE — 99233 PR SUBSEQUENT HOSPITAL CARE,LEVL III: ICD-10-PCS | Mod: ,,, | Performed by: INTERNAL MEDICINE

## 2023-06-07 PROCEDURE — 27000221 HC OXYGEN, UP TO 24 HOURS

## 2023-06-07 PROCEDURE — 27100171 HC OXYGEN HIGH FLOW UP TO 24 HOURS

## 2023-06-07 RX ORDER — QUETIAPINE FUMARATE 25 MG/1
50 TABLET, FILM COATED ORAL DAILY
Status: DISCONTINUED | OUTPATIENT
Start: 2023-06-07 | End: 2023-06-08

## 2023-06-07 RX ORDER — TALC
9 POWDER (GRAM) TOPICAL NIGHTLY PRN
Status: DISCONTINUED | OUTPATIENT
Start: 2023-06-07 | End: 2023-06-16 | Stop reason: HOSPADM

## 2023-06-07 RX ORDER — HYDROMORPHONE HYDROCHLORIDE 1 MG/ML
1 INJECTION, SOLUTION INTRAMUSCULAR; INTRAVENOUS; SUBCUTANEOUS ONCE
Status: COMPLETED | OUTPATIENT
Start: 2023-06-07 | End: 2023-06-07

## 2023-06-07 RX ADMIN — DEXMEDETOMIDINE HYDROCHLORIDE 1.4 MCG/KG/HR: 400 INJECTION INTRAVENOUS at 08:06

## 2023-06-07 RX ADMIN — DEXMEDETOMIDINE HYDROCHLORIDE 1.4 MCG/KG/HR: 400 INJECTION INTRAVENOUS at 04:06

## 2023-06-07 RX ADMIN — Medication 9 MG: at 09:06

## 2023-06-07 RX ADMIN — HEPARIN SODIUM 25 UNITS/KG/HR: 10000 INJECTION, SOLUTION INTRAVENOUS at 01:06

## 2023-06-07 RX ADMIN — VANCOMYCIN HYDROCHLORIDE 750 MG: 750 INJECTION, POWDER, LYOPHILIZED, FOR SOLUTION INTRAVENOUS at 04:06

## 2023-06-07 RX ADMIN — DEXMEDETOMIDINE HYDROCHLORIDE 1.4 MCG/KG/HR: 400 INJECTION INTRAVENOUS at 12:06

## 2023-06-07 RX ADMIN — HYDROMORPHONE HYDROCHLORIDE 1 MG: 1 INJECTION, SOLUTION INTRAMUSCULAR; INTRAVENOUS; SUBCUTANEOUS at 09:06

## 2023-06-07 RX ADMIN — HYDROMORPHONE HYDROCHLORIDE 1 MG: 1 INJECTION, SOLUTION INTRAMUSCULAR; INTRAVENOUS; SUBCUTANEOUS at 05:06

## 2023-06-07 RX ADMIN — SODIUM CHLORIDE: 9 INJECTION, SOLUTION INTRAVENOUS at 04:06

## 2023-06-07 RX ADMIN — DEXTROSE MONOHYDRATE 100 MG: 50 INJECTION, SOLUTION INTRAVENOUS at 07:06

## 2023-06-07 RX ADMIN — PIPERACILLIN AND TAZOBACTAM 4.5 G: 4; .5 INJECTION, POWDER, LYOPHILIZED, FOR SOLUTION INTRAVENOUS; PARENTERAL at 05:06

## 2023-06-07 RX ADMIN — SODIUM CHLORIDE: 9 INJECTION, SOLUTION INTRAVENOUS at 03:06

## 2023-06-07 RX ADMIN — LIDOCAINE PATCH 5% 1 PATCH: 700 PATCH TOPICAL at 04:06

## 2023-06-07 RX ADMIN — LEVETIRACETAM 500 MG: 5 INJECTION INTRAVENOUS at 08:06

## 2023-06-07 RX ADMIN — HYDROMORPHONE HYDROCHLORIDE 1 MG: 1 INJECTION, SOLUTION INTRAMUSCULAR; INTRAVENOUS; SUBCUTANEOUS at 01:06

## 2023-06-07 RX ADMIN — HYDROMORPHONE HYDROCHLORIDE 1 MG: 1 INJECTION, SOLUTION INTRAMUSCULAR; INTRAVENOUS; SUBCUTANEOUS at 11:06

## 2023-06-07 RX ADMIN — DEXTROSE MONOHYDRATE 100 MG: 50 INJECTION, SOLUTION INTRAVENOUS at 06:06

## 2023-06-07 RX ADMIN — HYDROMORPHONE HYDROCHLORIDE 1 MG: 1 INJECTION, SOLUTION INTRAMUSCULAR; INTRAVENOUS; SUBCUTANEOUS at 12:06

## 2023-06-07 RX ADMIN — MUPIROCIN: 20 OINTMENT TOPICAL at 08:06

## 2023-06-07 RX ADMIN — HYDROMORPHONE HYDROCHLORIDE 1 MG: 1 INJECTION, SOLUTION INTRAMUSCULAR; INTRAVENOUS; SUBCUTANEOUS at 07:06

## 2023-06-07 RX ADMIN — QUETIAPINE 50 MG: 25 TABLET ORAL at 09:06

## 2023-06-07 NOTE — PROGRESS NOTES
06/07/23 1615        Hemodialysis Catheter 06/02/23 2200 right internal jugular   Placement Date/Time: 06/02/23 2200   Present Prior to Hospital Arrival?: No  Hand Hygiene: Performed  Barrier Precautions: Performed  Skin Antisepsis: ChloraPrep  Location: right internal jugular  Insertion attempts (enter comment if more than 2 attem...   Site Assessment Bleeding;No redness;No swelling   Line Securement Device Secured with sutures   Dressing Type CHG impregnated dressing/sponge;Central line dressing   Dressing Status Clean;Dry;Intact   Dressing Intervention Sterile dressing change   Date on Dressing 06/07/23   Dressing Due to be Changed 06/14/23   Venous Patency/Care normal saline locked;flushed w/o difficulty   Arterial Patency/Care normal saline locked;flushed w/o difficulty   Post-Hemodialysis Assessment   Rinseback Volume (mL) 250 mL   Blood Volume Processed (Liters) 60 L   Dialyzer Clearance Lightly streaked   Duration of Treatment 180 minutes   Additional Fluid Intake (mL) 250 mL   Total UF (mL) 1500 mL   Net Fluid Removal 1000   Patient Response to Treatment tolerated well   Post-Hemodialysis Comments completed 3hr HD tx NET FLUID removed 1L.

## 2023-06-07 NOTE — PROGRESS NOTES
[facility] - Emergency Dept    Progress Note      Patient Name: Ryan Wild  MRN: [id number]  Admission Date: 6/2/2023  Attending Physician: Celso Busby MD   Primary Care Provider: Adilene Dillon NP    Patient information was obtained from patient and ER records.     Subjective:     Principal Problem:Non-traumatic rhabdomyolysis    Chief Complaint:   Chief Complaint   Patient presents with    Back Pain    Hypotension    Hearing Problem     PT REPORTS WAKING UP W LOWER BACK PAIN, WEAKNESS, SOB AND HEARING LOSS. 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  DENIES DRUG USE. HX OF SEIZURES, NON COMPLIANT W MEDS > 1 MONTH.  .  EKG OBTAINED.         HPI: Ryan Wild is a 32 y.o. male with history of seizures (off Keppra), drug use, hepatitis C (treated), and solitary kidney who presented to the ED on 6/2/2023  with a primary complaint of back pain. Patient is a  who had worked a 12 hour shift out in the sun the day prior. States that he went to bed feeling great, without any complaints. Then he woke up late for work day of admission and admits to not remembering much of what happened afterwards. His coworkers brought him to the ED. Admits to severe back pain that is aggravated by movement, weakness, shortness of breath. Also has not urinated all day. Denies chest pain, palpitations, headache, nausea, vomiting, abdominal pain, fevers. Last IV drug use 1 year ago, but snorted meth 2 days ago. Also uses IM steroids, last injection last night. Has not taken his home Keppra for the past few months; his last seizure was on 8/2022.      In the ED, patient presented hypotensive, tachycardic, SpO2 89% on RA. He was placed on bipap. Vasopressors were started when patient continued to be hypotensive with IVF. Labs significant for WBC 42.1, K 6.9, CO2 14, Cr 3.77, , . CPK 37k. LDH 2,2k. CBG normal. Troponin 1.878, EKG  no ischemic changes. Lactic 8.0. CXR  with increased interstitial markings in the right greater than left lung. Bedside Echo no abnormalities. Pt was given Vanc/Zosyn x1, and 4L bolus NS total. Medicine was called to admit patient for septic shock with multiorgan dysfunction.    Hospital course:  06/03/2023: Patient started on HD emergently, Levophed, Precedex, BiPAP, good response to Lasix  06/04/2023:  Recommend repeat HD today, nephro to see, off Levophed, continue Precedex, BiPAP, consider proning after dialysis, if no dialysis planned we will give repeat dose of Lasix.  6.5.23: No acute events overnight, patient remains on Precedex at max. Concerned about his status and why he is still in-house. Unable to tolerate vapotherm or proning for any appreciable amount of time. Currently being dialyzed.   6.6.23: NAEON. HD yesterday. Removed 0.5-1L ultrafiltrate. Well tolerated.  Able to wean off BIPAP to CPAP. HFNC at supper time and able to tolerate small meal. Back on CPAP at night. Still requiring sedation with Precedex. Currently on Vapotherm at 100% O2.    Interval history:  Desatted to low 79-80s on vapotherm. Initially refused to be placed back CPAP. However, was reasonable to be return to it after speaking with ICU nursing staff. Has been irritable and still reports he is in pain. Received Morphine x1. Still on Precedex 1.4mcg. Appears untouched. Has Net positive fluid balance.     Past Medical History:   Diagnosis Date    Depression     Opioid abuse     Seizures     Solitary kidney, congenital     Unspecified viral hepatitis C without hepatic coma        Past Surgical History:   Procedure Laterality Date    TONSILLECTOMY         Review of patient's allergies indicates:  No Known Allergies    No current facility-administered medications on file prior to encounter.     Current Outpatient Medications on File Prior to Encounter   Medication Sig    albuterol (PROVENTIL/VENTOLIN HFA) 90 mcg/actuation inhaler INHALE  2 PUFFS BY MOUTH EVERY 6 HOURS AS NEEDED FOR SHORTNESS OF BREATH OR WHEEZING    fluticasone propionate (FLONASE) 50 mcg/actuation nasal spray SHAKE LIQUID AND USE 2 SPRAYS IN EACH NOSTRIL DAILY    levETIRAcetam (KEPPRA) 500 MG Tab Take 1 tablet by mouth 2 (two) times daily.    QUEtiapine (SEROQUEL) 100 MG Tab Take 1 tablet (100 mg total) by mouth nightly.    valACYclovir (VALTREX) 1000 MG tablet Take 1,000 mg by mouth 3 (three) times daily.    venlafaxine (EFFEXOR-XR) 37.5 MG 24 hr capsule Take 37.5 mg by mouth every morning.     Family History       Problem Relation (Age of Onset)    Cerebral aneurysm Father          Tobacco Use    Smoking status: Every Day     Types: Vaping with nicotine    Smokeless tobacco: Current   Substance and Sexual Activity    Alcohol use: Not Currently    Drug use: Not Currently     Types: Heroin, Methamphetamines     Comment: 3 years sober    Sexual activity: Yes     Partners: Female     Review of Systems   Constitutional:  Negative for chills and fever.   Gastrointestinal:  Negative for abdominal pain, nausea and vomiting.   Genitourinary:  Negative for difficulty urinating.   Musculoskeletal:  Positive for back pain.   Neurological:  Negative for headaches.   Psychiatric/Behavioral:  Negative for hallucinations. The patient is nervous/anxious.    Objective:     Vital Signs (Most Recent):  Temp: 98.2 °F (36.8 °C) (06/07/23 0410)  Pulse: 75 (06/07/23 0600)  Resp: 13 (06/07/23 0600)  BP: (!) 140/87 (06/07/23 0600)  SpO2: 97 % (06/07/23 0600) Vital Signs (24h Range):  Temp:  [98.2 °F (36.8 °C)-98.4 °F (36.9 °C)] 98.2 °F (36.8 °C)  Pulse:  [] 75  Resp:  [13-30] 13  SpO2:  [77 %-97 %] 97 %  BP: (112-170)/(75-96) 140/87  Arterial Line BP: (140-189)/(68-96) 164/96     Weight: 69.4 kg (153 lb)  Body mass index is 23.96 kg/m².    Physical Exam  Constitutional:       General: He is awake. He is in acute distress.      Appearance: Normal appearance. He is normal weight. He is ill-appearing  and diaphoretic.      Comments: Lying in bed on CPAP.    Cardiovascular:      Rate and Rhythm: Normal rate and regular rhythm.      Pulses: Normal pulses.           Dorsalis pedis pulses are 2+ on the right side and 2+ on the left side.      Heart sounds: Normal heart sounds.      Arteriovenous access: Right and left arteriovenous access is present.  Pulmonary:      Effort: Pulmonary effort is normal.      Breath sounds: Normal air entry. Wheezing present.   Abdominal:      General: Abdomen is flat. Bowel sounds are normal.      Palpations: Abdomen is soft.      Tenderness: There is no abdominal tenderness. There is no guarding.   Musculoskeletal:      Lumbar back: Deformity and tenderness present. No signs of trauma or lacerations.      Right lower leg: No edema.      Left lower leg: No edema.      Comments: Significant decrease in swelling and bilateral lumbar firmness. New Lidocaine patch in place.   Feet:      Right foot:      Skin integrity: Skin integrity normal.      Left foot:      Skin integrity: Skin integrity normal.   Skin:     General: Skin is warm.      Capillary Refill: Capillary refill takes 2 to 3 seconds.      Coloration: Skin is not cyanotic, jaundiced or mottled.   Neurological:      Mental Status: He is alert and oriented to person, place, and time.      GCS: GCS eye subscore is 4. GCS verbal subscore is 5. GCS motor subscore is 6.   Psychiatric:         Behavior: Behavior is cooperative.          Significant Labs: All pertinent labs within the past 24 hours have been reviewed.    Significant Imaging: I have reviewed all pertinent imaging results/findings within the past 24 hours.    Assessment/Plan:   Methamphetamine/fentanyl overdose  Acute toxic metabolic encephalopathy secondary to above  Acute renal failure stage III secondary to rhabdo secondary to above- Improving  History of polysubstance use disorder  History of anabolic steroid abuse  Pneumonia-community-acquired with possible  component of aspiration  Sepsis secondary to above- Improving  Leukocytosis-Stable  Hyperkalemia- Resolved with dialysis  Severe fluid overload- Improving  NSTEMI type 2, however given high peak concern for type 1  Shock- Resolved      - Continue ICU level of care, Precedex for compliance with NIPAP. Believe Precedex can be weaned/discontinued.  -Continuing slow NIPAP wean. Currently on Vapotherm at 100% FiO2. Saturations high 80s-low 90s.  -nephrology following. Ultrafiltration today.   -Echo 50%EF. Positive intracardiac shunt. Cardiology consult.  -Repeat CXR shows no significant change 6.6.23  - Off levophed. Hypertensive (). Goal map 75-80 given significant KINGSLEY.  -fluids per Nephrology on NS at 100 cc/hr.  - Continue Keppra 500mg IV. Keppra level pending.   -continue heparin for NSTEMI. Consider discontinue.   - continue vanc/Zosyn/doxy with renal dosing day 5. Vanc 500mg pulse dose.     CODE STATUS: FULL   Access: HD line, art line, PIV  Antibiotics: Doxycycline and Zosyn  Diet: Regular  DVT Prophylaxis: heparin   GI Prophylaxis: None  Sedation: Precedex 1.4mcg  Fluids: NS at 100cc/hr       Disposition: Continue Oxygen status monitoring. Wean NIPAP as tolerated. HD per nephrology recs. IV antibiotics and pain medication as needed.    Dillon Dominguez MD  Department of Hospital Medicine

## 2023-06-07 NOTE — CONSULTS
" [facility] Cardiology Consult Note     Cardiology Attending: Dr. Swan  Cardiology Fellow: Bryn Peña MD     Date of Admit: 6/2/2023  Date of Consult: 6/7/2023    Reason for Consultation:     Hypoxemia of unknown etiology  Intracardiac shunt    History of Present Illness:      Ryan Wild is a 32 y.o. male with a PMH significant for solitary kidney, polysubstance abuse, seizures, hepatitis C who presented with back pain. He was found to be in severe metabolic and resp acidosis on arrival (ph 7.09, bicarb 16.4, CO2 54) rhabdomyolysis with elevated CK (peaked at 37,420), troponin (peaked at 5.6), KINGSLEY (creatinine up to 4.7), elevated LFTs, elevated WBC>40k. Drug screen was positive for amphetamines and fentanyl; Per notes, pt admitted to IV drug use and anabolic steroid use. Emergency dialysis was initiated. However, the pt continued to remain hypoxemic and dyspneic. Echo was done which showed positive intracardiac shunt. Cardiology is consulted for assistance with diagnosing etiology of hypoxemia.     Of note, the pt states he is usually physically very active. 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 He states he has seen a cardiologist in the past as "his heart was affected by excessive drug use." He denies chest pain, orthopnea/PND, leg swelling, palpitations, lightheadedness/dizziness, syncopal events, or other concerning symtpoms.    Past Medical History:     Past Medical History:   Diagnosis Date    Depression     Opioid abuse     Seizures     Solitary kidney, congenital     Unspecified viral hepatitis C without hepatic coma      Surgical History:     Past Surgical History:   Procedure Laterality Date    TONSILLECTOMY       Allergies:   Review of patient's allergies indicates:  No Known Allergies  Family History:     Family History   Problem Relation Age of Onset    Cerebral aneurysm Father      Social History:     Social History     Tobacco Use "    Smoking status: Every Day     Types: Vaping with nicotine    Smokeless tobacco: Current   Substance Use Topics    Alcohol use: Not Currently    Drug use: Not Currently     Types: Heroin, Methamphetamines     Comment: 3 years sober     Review of Systems:     Constitutional: Denies fevers, chills, night sweats, anorexia, unexplained weight loss, malaise, fatigue  Eyes: Denies blurry vision, eye pain, floaters  ENT: Denies rhinorrhea, epistaxis, tinnitus, ear pain, sore throat, dysphagia, odynophagia   Gastrointestinal:  Denies dysphagia, nausea, vomiting, diarrhea, constipation, abdominal pain, melena, BRBPR, hematemesis, constipation, ascites  Genitourinary:  Denies dysuria, discharge, incontinence, hematuria, polyuria  Musculoskeletal:  Denies pain, stiffness, decreased ROM  Neurological: Denies any changes in sensation, weakness, seizures, headache,  parasthesias, confusion   Psych: Euthymic, denies hallucinations, SI/HI  Endocrine: Denies cold/heat intolerance, polyuria, polydypsia  Skin: Denies rash, jaundice, pruritis, wounds, lesions    Medications:     Home Medications:  Prior to Admission medications    Medication Sig Start Date End Date Taking? Authorizing Provider   albuterol (PROVENTIL/VENTOLIN HFA) 90 mcg/actuation inhaler INHALE 2 PUFFS BY MOUTH EVERY 6 HOURS AS NEEDED FOR SHORTNESS OF BREATH OR WHEEZING 11/25/22   Historical Provider   fluticasone propionate (FLONASE) 50 mcg/actuation nasal spray SHAKE LIQUID AND USE 2 SPRAYS IN EACH NOSTRIL DAILY 11/25/22   Historical Provider   levETIRAcetam (KEPPRA) 500 MG Tab Take 1 tablet by mouth 2 (two) times daily. 8/18/22   Historical Provider   QUEtiapine (SEROQUEL) 100 MG Tab Take 1 tablet (100 mg total) by mouth nightly. 7/21/22   Brain Velasquez MD   valACYclovir (VALTREX) 1000 MG tablet Take 1,000 mg by mouth 3 (three) times daily. 2/8/23   Historical Provider   venlafaxine (EFFEXOR-XR) 37.5 MG 24 hr capsule Take 37.5 mg by mouth every morning. 5/25/23  "  Historical Provider       Current/Inpatient Medications:  Infusions:   sodium chloride 0.9% 100 mL/hr at 06/07/23 1530    dexmedeTOMIDine (Precedex) infusion (titrating) 0.4 mcg/kg/hr (06/07/23 1104)    NORepinephrine bitartrate-D5W Stopped (06/04/23 0200)     Scheduled:   doxycycline (VIBRAMYCIN) IVPB  100 mg Intravenous Q12H    levETIRAcetam (Keppra) IV (PEDS and ADULTS)  500 mg Intravenous Q12H    LIDOcaine  1 patch Transdermal Q24H    mupirocin   Nasal BID    piperacillin-tazobactam (Zosyn) IV (PEDS and ADULTS) (extended infusion is not appropriate)  4.5 g Intravenous Q12H    QUEtiapine  50 mg Oral Daily    vancomycin (VANCOCIN) IVPB  750 mg Intravenous Once     PRN:  calcium gluconate IVPB, calcium gluconate IVPB, calcium gluconate IVPB, dextrose 10%, dextrose, dextrose, glucagon (human recombinant), HYDROmorphone, insulin regular, naloxone, sodium chloride 0.9%, Pharmacy to dose Vancomycin consult **AND** vancomycin - pharmacy to dose    Objective:     24-hour Vitals:   Temp:  [98.2 °F (36.8 °C)-98.6 °F (37 °C)] 98.6 °F (37 °C)  Pulse:  [] 100  Resp:  [13-31] 28  SpO2:  [77 %-97 %] 97 %  BP: (112-158)/(64-96) 129/71  Arterial Line BP: (140-189)/(68-96) 164/96    Vitals: /71   Pulse 100   Temp 98.6 °F (37 °C)   Resp (!) 28   Ht 5' 7" (1.702 m)   Wt 69.4 kg (153 lb)   SpO2 97%   BMI 23.96 kg/m²     Intake/Output Summary (Last 24 hours) at 6/7/2023 1641  Last data filed at 6/7/2023 1615  Gross per 24 hour   Intake 5468.47 ml   Output 2705 ml   Net 2763.47 ml       Physical Exam:  Gen: NAD.   HEENT: NC, AT, EOMI.   Neck: NO JVD, supple, no thyromegaly.   CV: RRR, Normal S1 and S2, No murmurs, rubs or gallops.   Chest: CTA B/L.    Abd: soft, non tender, non distended, No HSM.   Extr: No edema, no cyanosis, +2 pulse equal and bilateral.   Neuro: AOx3,  No focal neurological deficit.   Skin: no rashes.     Labs:   I have reviewed the following labs below:    Recent Labs   Lab 06/05/23  0407 " 06/06/23  0333 06/07/23  0404   WBC 22.58* 18.85* 20.47*   HGB 11.5* 10.8* 11.5*   HCT 35.8* 33.3* 33.8*    230 245     Recent Labs   Lab 06/05/23  0407 06/06/23  0333 06/07/23  0404    139 138   K 4.7 3.6 3.6   CO2 20* 25 21*   BUN 46.7* 30.6* 40.0*   CREATININE 4.75* 4.10* 4.79*   CALCIUM 7.5* 7.4* 7.4*   MG 2.10 1.90 1.70   PHOS 4.1 2.6 2.7     Recent Labs   Lab 06/05/23  0407 06/06/23  0333 06/07/23  0404   ALBUMIN 2.4* 2.0* 1.7*   BILITOT 0.4 0.4 0.4   * 527* 320*   * 336* 257*   ALKPHOS 55 42 41     Recent Labs   Lab 06/02/23  1419 06/03/23  0402 06/05/23  2046 06/06/23  0333 06/07/23  0404   PTT 27.1   < > 66.4 64.0 49.4   INR 1.35*  --   --   --   --     < > = values in this interval not displayed.     Recent Labs   Lab 06/03/23  0715 06/03/23  1426 06/03/23  1928   TROPONINI 5.606* 5.061* 4.604*     Cardiovascular Studies/Imaging:   I have reviewed the following studies below:    Echo 6/5/23  The left ventricle is normal in size with low normal systolic function.  The estimated ejection fraction is 50%.  Normal left ventricular diastolic function.  Normal right ventricular size with normal right ventricular systolic function.  Mild right atrial enlargement.  Elevated central venous pressure (15 mmHg).  The estimated PA systolic pressure is 20 mmHg.  Study is positive for intercardiac shunt.    Assessment:     Ryan Wild is a 32 y.o. male with a PMH significant for solitary kidney, polysubstance abuse, seizures, hepatitis C who presented with back pain. He was found to be in severe metabolic and resp acidosis on arrival (ph 7.09, bicarb 16.4, CO2 54) rhabdomyolysis with elevated CK (peaked at 37,420), troponin (peaked at 5.6), KINGSLEY (creatinine up to 4.7), elevated LFTs, elevated WBC>40k. Drug screen was positive for amphetamines and fentanyl; Per notes, pt admitted to IV drug use and anabolic steroid use. Emergency dialysis was initiated. However, the pt continued to remain  hypoxemic and dyspneic. Echo was done which showed positive intracardiac shunt. Cardiology is consulted for assistance with diagnosing etiology of hypoxemia.     Plan/Recommendations:     # Acute hypoxemic respiratory failure  # Presence of intracardiac shunt (ASD)  Echo shows EF 50% with presence of ASD with R-->L shunt on valsalva. PASP is 20 mmHg, CVP 15 mmHg. Left heart function appears to be unremarkable.   - Unlikely that his ASD is causing the pt's hypoxemia. Given his CXR appearance, suspect pulmonary/infectious etiology is driving his hypoxemia.  - Consider ruling out PE.  - Consider ruling out methemoglobinemia  - If no etiology is identified, could consider doing a EUGENIA to better evaluate the ASD (although the pt is not an ideal surgical ASD closure candidate given his ongoing substance abuse), so EUGENIA would be purely diagnostic at this point.  - Another alternative is to do a right heart cath to evaluate left and right heart pressures and measure O2 levels in right and left heart, but would try to rule out pulmonary/infectious etiology first.         Bryn Peña MD, MSCI  [facility] Cardiology Fellow  06/07/2023 4:41 PM  Formerly [facility], later [facility]

## 2023-06-07 NOTE — PROGRESS NOTES
[facility]  Nephrology Progress Note  Patient Name: Ryan Wild  Age: 32 y.o.  : 1990  MRN: [id number]  Admission Date: 2023    Chief complaint: Back Pain, Hypotension, and Hearing Problem (PT REPORTS WAKING UP W LOWER BACK PAIN, WEAKNESS, SOB AND HEARING LOSS. 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  DENIES DRUG USE. HX OF SEIZURES, NON COMPLIANT W MEDS > 1 MONTH.  .  EKG OBTAINED. )      Hospital course  Ryan Wild is a 32 y.o. White male with past medical history of seizures, polysubstance abuse, treated hepatitis-C, and solitary kidney.  Patient presented to the emergency department on 2023 with complaints of back pain, shortness breath, and decreased urinary frequency.  Patient believes that symptoms were precipitated by the fact that he has been working outside in the heat for 12 hours. He admitted to polysubstance abuse, including intravenous drug use, and anabolic steroid use.  UDS was positive for methamphetamines and fentanyl. In the emergency department, patient was tachycardic, hypotensive, and hypoxemic.  Laboratory results were remarkable for severe leukocytosis (WBC 41), azotemia, metabolic acidosis, hyperkalemia (serum potassium 6.9), transaminitis, hyperphosphatemia, elevated troponin, and elevated lactic acid level.  CK was 37,420 units per L.  Patient was admitted to the intensive care unit, placed on BiPAP, and emergently dialyzed for correction of life-threatening hyperkalemia on 2023.  Nephrology is continuing to follow for assistance with management of acute kidney injury and multiple electrolyte/acid-base abnormalities    Subjective  Patient is resting quietly in bed, on 100% FiO2 via Vapotherm.      Review of Systems  Respiratory: Positive for shortness of breath.    Cardiovascular:  Negative for chest pain or edema.    Objective  /79   Pulse 76   Temp 98.6 °F (37  "°C)   Resp (!) 31   Ht 5' 7" (1.702 m)   Wt 69.4 kg (153 lb)   SpO2 (!) 84%   BMI 23.96 kg/m²     Intake/Output Summary (Last 24 hours) at 6/7/2023 0741  Last data filed at 6/7/2023 0604  Gross per 24 hour   Intake 5218.47 ml   Output 895 ml   Net 4323.47 ml         Physical Exam  General appearance: Patient is in no acute distress. On NIPPV  HEENT: PERRLA, EOMI, no JVD. Neck is supple.  Right IJ dialysis catheter  Chest:  Mildly tachypneic.  Lung sounds are diminished to auscultation.    Heart: S1, S2.   Abdomen: Benign.  :  Umaña catheter to gravity with dark yellow urine in the collection chamber.  Extremities: No edema, peripheral pulses are palpable.   Neuro: No focal deficits.     Medications    Current Facility-Administered Medications:     0.9%  NaCl infusion, , Intravenous, Continuous, TREY HillP, Last Rate: 100 mL/hr at 06/07/23 0428, New Bag at 06/07/23 0428    calcium gluconate 1 g in NS IVPB (premixed), 1 g, Intravenous, PRN, Lm Yanes MD, Stopped at 06/04/23 0346    calcium gluconate 1 g in NS IVPB (premixed), 2 g, Intravenous, PRN, Lm Yanes MD    calcium gluconate 1 g in NS IVPB (premixed), 3 g, Intravenous, PRN, Lm Yanes MD    dexmedetomidine (PRECEDEX) 400mcg/100mL 0.9% NaCL infusion, 0-1.4 mcg/kg/hr, Intravenous, Continuous, Dillon Dominguez MD, Last Rate: 24.3 mL/hr at 06/07/23 0408, 1.4 mcg/kg/hr at 06/07/23 0408    dextrose 10% bolus 125 mL 125 mL, 12.5 g, Intravenous, PRN, Alda Valladares MD, Stopped at 06/02/23 2234    dextrose 40 % gel 15,000 mg, 15 g, Oral, PRN, Alda Valladares MD    dextrose 40 % gel 30,000 mg, 30 g, Oral, PRN, Alda Valladares MD    doxycycline (VIBRAMYCIN) 100 mg in dextrose 5 % (D5W) 100 mL IVPB, 100 mg, Intravenous, Q12H, Dominic Armijo MD, Stopped at 06/07/23 0706    glucagon (human recombinant) injection 1 mg, 1 mg, Intramuscular, PRN, Alda Valladares MD    heparin 25,000 units in dextrose 5% (100 units/ml) IV bolus from bag - ADDITIONAL PRN " BOLUS - 30 units/kg (max bolus 4000 units), 30 Units/kg (Adjusted), Intravenous, PRN, Dillon Dominguez MD, 2,020 Units at 06/05/23 1450    heparin 25,000 units in dextrose 5% (100 units/ml) IV bolus from bag - ADDITIONAL PRN BOLUS - 60 units/kg (max bolus 4000 units), 59.3 Units/kg (Adjusted), Intravenous, PRN, Dillon Dominguez MD, 4,000 Units at 06/07/23 0559    heparin 25,000 units in dextrose 5% 250 mL (100 units/mL) infusion LOW INTENSITY nomogram - LAF, 0-40 Units/kg/hr (Adjusted), Intravenous, Continuous, Dillon Dominguez MD, Last Rate: 18.9 mL/hr at 06/07/23 0618, 28 Units/kg/hr at 06/07/23 0618    HYDROmorphone injection 1 mg, 1 mg, Intravenous, Q6H PRN, Dominic Armijo MD, 1 mg at 06/07/23 0701    insulin regular injection 6.94 Units 0.0694 mL, 0.1 Units/kg, Intravenous, PRN, Dominic Armijo MD, 6.94 Units at 06/02/23 2112    levETIRAcetam in NaCl (iso-os) IVPB 500 mg, 500 mg, Intravenous, Q12H, Dillon Dominguez MD, Stopped at 06/06/23 2041    LIDOcaine 5 % patch 1 patch, 1 patch, Transdermal, Q24H, Dillon Dominguez MD, 1 patch at 06/06/23 1626    mupirocin 2 % ointment, , Nasal, BID, Dewayne Torres MD, Given at 06/06/23 2047    naloxone 0.4 mg/mL injection 0.02 mg, 0.02 mg, Intravenous, PRN, Alda Valladares MD    NORepinephrine 4 mg in dextrose 5% 250 mL infusion (premix) (titrating), 0-3 mcg/kg/min, Intravenous, Continuous, Juan Manuel Miranda MD, Paused at 06/04/23 0200    piperacillin-tazobactam (ZOSYN) 4.5 g in dextrose 5 % in water (D5W) 5 % 100 mL IVPB (MB+), 4.5 g, Intravenous, Q12H, Dominic Armijo MD, Last Rate: 25 mL/hr at 06/07/23 0558, 4.5 g at 06/07/23 0558    sodium chloride 0.9% flush 10 mL, 10 mL, Intravenous, Q12H PRN, Alda Valladares MD    Pharmacy to dose Vancomycin consult, , , Once **AND** vancomycin - pharmacy to dose, , Intravenous, pharmacy to manage frequency, Alda Valladares MD     Imaging:    Reviewed    Laboratory Data:  Hematology  Lab Results   Component Value Date    WBC 20.47 (H) 06/07/2023     HGB 11.5 (L) 06/07/2023    HCT 33.8 (L) 06/07/2023     06/07/2023     06/07/2023    K 3.6 06/07/2023    CHLORIDE 106 06/07/2023    CO2 21 (L) 06/07/2023    BUN 40.0 (H) 06/07/2023    CREATININE 4.79 (H) 06/07/2023    EGFRNORACEVR 16 06/07/2023    GLUCOSE 81 06/07/2023    CALCIUM 7.4 (L) 06/07/2023    ALKPHOS 41 06/07/2023    LABPROT 4.6 (L) 06/07/2023    ALBUMIN 1.7 (L) 06/07/2023     (H) 06/07/2023     (H) 06/07/2023    MG 1.70 06/07/2023    PHOS 2.7 06/07/2023     Lab Results   Component Value Date    FERRITIN 153.40 09/14/2022    LDH 2,294 (H) 06/02/2023       Lab Results   Component Value Date    HIV Nonreactive 09/14/2022    HEPBSURFAG Nonreactive 06/02/2023    HEPBSAB Nonreactive 09/14/2022    HEPBCAB Nonreactive 09/14/2022         Impression  Nonoliguric acute kidney injury, 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 and/or hypoperfusion  Solitary kidney  Rhabdomyolysis   Transaminitis, improving  Multifocal pneumonia   Respiratory failure  NSTEMI  History of seizure disorder   Polysubstance abuse  Treated hepatitis-C    Plan  Patient's condition is overall stable, he is non oliguric, but azotemia is not improving. Will dialyze today for uremic toxin clearance, continue supportive care and monitoring for functional renal recovery.    La Figueroa NP  [facility]ph[facility]   6/7/2023

## 2023-06-07 NOTE — CARE UPDATE
Called about patient desaturation into the low 80s this evening on vapotherm and patient refusal to wear BiPAP. Went to bedside to reason with patient and urge him to wear the biPAP as instructed by his day team. Patient adamantly refused stating that he feels fine and that the reason he was desatting was because he was talking. I told him that desating while talking should not occur...   He eventually stated that the mask was causing pain and at that point I told him if he continues to not comply he may eventually be intubated. He eventually complied and stated he would wear the BiPAP if he got his pain medications. I left the room and talked with the nurse to give him medication. I was under the impression that he was wearing the mask after our talk but I got another call at 2230 that he was still not wearing the mask and remains satting in the 80 with an additional concern for aspiration while drinking his water. At this time will get an ABG, give morphine to calm him, and get CXR.    Patient decided to wear BiPAP prior to ABG draw...    DANIEL, DO EMMA FRIEDYII

## 2023-06-08 LAB
A-ADO2 BLOOD GAS (OHS): 593 MMHG
ALBUMIN SERPL-MCNC: 1.6 G/DL (ref 3.5–5)
ALBUMIN/GLOB SERPL: 0.6 RATIO (ref 1.1–2)
ALP SERPL-CCNC: 40 UNIT/L (ref 40–150)
ALT SERPL-CCNC: 202 UNIT/L (ref 0–55)
APTT PPP: 31.3 SECONDS
AST SERPL-CCNC: 195 UNIT/L (ref 5–34)
BASE EXCESS BLD CALC-SCNC: -0.4 MMOL/L (ref -2–2)
BASOPHILS # BLD AUTO: 0.04 X10(3)/MCL
BASOPHILS NFR BLD AUTO: 0.2 %
BILIRUBIN DIRECT+TOT PNL SERPL-MCNC: 0.6 MG/DL
BLOOD GAS SAMPLE TYPE (OHS): ABNORMAL
BUN SERPL-MCNC: 29.3 MG/DL (ref 8.9–20.6)
CALCIUM SERPL-MCNC: 7.5 MG/DL (ref 8.4–10.2)
CHLORIDE SERPL-SCNC: 106 MMOL/L (ref 98–107)
CK SERPL-CCNC: 3429 U/L (ref 30–200)
CO2 BLDA-SCNC: 25.3 MMOL/L (ref 22–26)
CO2 SERPL-SCNC: 22 MMOL/L (ref 22–29)
COHGB MFR BLDA: 1.8 % (ref 0.5–1.5)
CREAT SERPL-MCNC: 4.03 MG/DL (ref 0.73–1.18)
EOSINOPHIL # BLD AUTO: 0.03 X10(3)/MCL (ref 0–0.9)
EOSINOPHIL NFR BLD AUTO: 0.1 %
ERYTHROCYTE [DISTWIDTH] IN BLOOD BY AUTOMATED COUNT: 12.8 % (ref 11.5–17)
FIO2 BLOOD GAS (OHS): 100 %
GFR SERPLBLD CREATININE-BSD FMLA CKD-EPI: 19 MLS/MIN/1.73/M2
GLOBULIN SER-MCNC: 2.9 GM/DL (ref 2.4–3.5)
GLUCOSE SERPL-MCNC: 78 MG/DL (ref 74–100)
HCO3 BLDA-SCNC: 24.1 MMOL/L (ref 22–26)
HCT VFR BLD AUTO: 33.4 % (ref 42–52)
HGB BLD-MCNC: 11.1 G/DL (ref 14–18)
IMM GRANULOCYTES # BLD AUTO: 0.17 X10(3)/MCL (ref 0–0.04)
IMM GRANULOCYTES NFR BLD AUTO: 0.8 %
LPM (OHS): 40
LYMPHOCYTES # BLD AUTO: 0.85 X10(3)/MCL (ref 0.6–4.6)
LYMPHOCYTES NFR BLD AUTO: 4.1 %
MAGNESIUM SERPL-MCNC: 1.8 MG/DL (ref 1.6–2.6)
MCH RBC QN AUTO: 29.4 PG (ref 27–31)
MCHC RBC AUTO-ENTMCNC: 33.2 G/DL (ref 33–36)
MCV RBC AUTO: 88.4 FL (ref 80–94)
METHGB MFR BLDA: 0.8 % (ref 0–1.5)
MONOCYTES # BLD AUTO: 2.88 X10(3)/MCL (ref 0.1–1.3)
MONOCYTES NFR BLD AUTO: 14 %
NEUTROPHILS # BLD AUTO: 16.61 X10(3)/MCL (ref 2.1–9.2)
NEUTROPHILS NFR BLD AUTO: 80.8 %
NRBC BLD AUTO-RTO: 0 %
O2 HB BLOOD GAS (OHS): 94.3 % (ref 94–100)
OXYGEN DEVICE BLOOD GAS (OHS): ABNORMAL
OXYHGB MFR BLDA: 11.6 G/DL (ref 12–18)
PAO2 BLOOD GAS (OHS): 666 MMHG
PCO2 BLDA: 38 MMHG (ref 35–45)
PH BLDA: 7.41 [PH] (ref 7.35–7.45)
PHOSPHATE SERPL-MCNC: 2.7 MG/DL (ref 2.3–4.7)
PLATELET # BLD AUTO: 244 X10(3)/MCL (ref 130–400)
PMV BLD AUTO: 10.1 FL (ref 7.4–10.4)
PO2 BLDA: 73 MMHG (ref 75–100)
POTASSIUM SERPL-SCNC: 3.4 MMOL/L (ref 3.5–5.1)
PROT SERPL-MCNC: 4.5 GM/DL (ref 6.4–8.3)
RBC # BLD AUTO: 3.78 X10(6)/MCL (ref 4.7–6.1)
SAO2 % BLDA: 96.9 %
SODIUM SERPL-SCNC: 138 MMOL/L (ref 136–145)
VANCOMYCIN SERPL-MCNC: 17.5 UG/ML (ref 15–20)
WBC # SPEC AUTO: 20.58 X10(3)/MCL (ref 4.5–11.5)

## 2023-06-08 PROCEDURE — 63600175 PHARM REV CODE 636 W HCPCS: Performed by: FAMILY MEDICINE

## 2023-06-08 PROCEDURE — 99900035 HC TECH TIME PER 15 MIN (STAT)

## 2023-06-08 PROCEDURE — 99233 PR SUBSEQUENT HOSPITAL CARE,LEVL III: ICD-10-PCS | Mod: ,,, | Performed by: INTERNAL MEDICINE

## 2023-06-08 PROCEDURE — 99233 SBSQ HOSP IP/OBS HIGH 50: CPT | Mod: ,,, | Performed by: INTERNAL MEDICINE

## 2023-06-08 PROCEDURE — 37799 UNLISTED PX VASCULAR SURGERY: CPT

## 2023-06-08 PROCEDURE — 21400001 HC TELEMETRY ROOM

## 2023-06-08 PROCEDURE — 25000003 PHARM REV CODE 250: Performed by: FAMILY MEDICINE

## 2023-06-08 PROCEDURE — 63600175 PHARM REV CODE 636 W HCPCS: Performed by: INTERNAL MEDICINE

## 2023-06-08 PROCEDURE — 83735 ASSAY OF MAGNESIUM: CPT

## 2023-06-08 PROCEDURE — 84100 ASSAY OF PHOSPHORUS: CPT

## 2023-06-08 PROCEDURE — 63600175 PHARM REV CODE 636 W HCPCS: Performed by: STUDENT IN AN ORGANIZED HEALTH CARE EDUCATION/TRAINING PROGRAM

## 2023-06-08 PROCEDURE — 85025 COMPLETE CBC W/AUTO DIFF WBC: CPT | Performed by: STUDENT IN AN ORGANIZED HEALTH CARE EDUCATION/TRAINING PROGRAM

## 2023-06-08 PROCEDURE — 80053 COMPREHEN METABOLIC PANEL: CPT

## 2023-06-08 PROCEDURE — 94761 N-INVAS EAR/PLS OXIMETRY MLT: CPT

## 2023-06-08 PROCEDURE — 27100171 HC OXYGEN HIGH FLOW UP TO 24 HOURS

## 2023-06-08 PROCEDURE — 80202 ASSAY OF VANCOMYCIN: CPT | Performed by: STUDENT IN AN ORGANIZED HEALTH CARE EDUCATION/TRAINING PROGRAM

## 2023-06-08 PROCEDURE — 85730 THROMBOPLASTIN TIME PARTIAL: CPT | Performed by: STUDENT IN AN ORGANIZED HEALTH CARE EDUCATION/TRAINING PROGRAM

## 2023-06-08 PROCEDURE — 25000003 PHARM REV CODE 250: Performed by: INTERNAL MEDICINE

## 2023-06-08 PROCEDURE — 94799 UNLISTED PULMONARY SVC/PX: CPT

## 2023-06-08 PROCEDURE — 25000003 PHARM REV CODE 250: Performed by: NURSE PRACTITIONER

## 2023-06-08 PROCEDURE — 25000003 PHARM REV CODE 250: Performed by: STUDENT IN AN ORGANIZED HEALTH CARE EDUCATION/TRAINING PROGRAM

## 2023-06-08 PROCEDURE — 82550 ASSAY OF CK (CPK): CPT | Performed by: STUDENT IN AN ORGANIZED HEALTH CARE EDUCATION/TRAINING PROGRAM

## 2023-06-08 PROCEDURE — 82803 BLOOD GASES ANY COMBINATION: CPT

## 2023-06-08 RX ORDER — HYDROMORPHONE HYDROCHLORIDE 1 MG/ML
1 INJECTION, SOLUTION INTRAMUSCULAR; INTRAVENOUS; SUBCUTANEOUS
Status: DISCONTINUED | OUTPATIENT
Start: 2023-06-08 | End: 2023-06-09

## 2023-06-08 RX ORDER — QUETIAPINE FUMARATE 100 MG/1
100 TABLET, FILM COATED ORAL DAILY
Status: DISCONTINUED | OUTPATIENT
Start: 2023-06-09 | End: 2023-06-16 | Stop reason: HOSPADM

## 2023-06-08 RX ORDER — HYDROMORPHONE HYDROCHLORIDE 1 MG/ML
1 INJECTION, SOLUTION INTRAMUSCULAR; INTRAVENOUS; SUBCUTANEOUS
Status: DISCONTINUED | OUTPATIENT
Start: 2023-06-08 | End: 2023-06-08

## 2023-06-08 RX ORDER — POTASSIUM CHLORIDE 20 MEQ/1
40 TABLET, EXTENDED RELEASE ORAL ONCE
Status: COMPLETED | OUTPATIENT
Start: 2023-06-08 | End: 2023-06-08

## 2023-06-08 RX ORDER — LEVETIRACETAM 500 MG/1
500 TABLET ORAL 2 TIMES DAILY
Status: DISCONTINUED | OUTPATIENT
Start: 2023-06-08 | End: 2023-06-16 | Stop reason: HOSPADM

## 2023-06-08 RX ORDER — PROCHLORPERAZINE MALEATE 10 MG
10 TABLET ORAL 3 TIMES DAILY PRN
Status: DISCONTINUED | OUTPATIENT
Start: 2023-06-08 | End: 2023-06-16 | Stop reason: HOSPADM

## 2023-06-08 RX ORDER — PREDNISONE 50 MG/1
50 TABLET ORAL DAILY
Status: COMPLETED | OUTPATIENT
Start: 2023-06-08 | End: 2023-06-12

## 2023-06-08 RX ORDER — HYDROMORPHONE HYDROCHLORIDE 1 MG/ML
0.5 INJECTION, SOLUTION INTRAMUSCULAR; INTRAVENOUS; SUBCUTANEOUS ONCE
Status: COMPLETED | OUTPATIENT
Start: 2023-06-08 | End: 2023-06-08

## 2023-06-08 RX ORDER — DOXYCYCLINE HYCLATE 100 MG
100 TABLET ORAL EVERY 12 HOURS
Status: DISCONTINUED | OUTPATIENT
Start: 2023-06-08 | End: 2023-06-09

## 2023-06-08 RX ADMIN — PROCHLORPERAZINE MALEATE 10 MG: 10 TABLET ORAL at 08:06

## 2023-06-08 RX ADMIN — PIPERACILLIN AND TAZOBACTAM 4.5 G: 4; .5 INJECTION, POWDER, LYOPHILIZED, FOR SOLUTION INTRAVENOUS; PARENTERAL at 05:06

## 2023-06-08 RX ADMIN — QUETIAPINE 50 MG: 25 TABLET ORAL at 08:06

## 2023-06-08 RX ADMIN — HYDROMORPHONE HYDROCHLORIDE 1 MG: 1 INJECTION, SOLUTION INTRAMUSCULAR; INTRAVENOUS; SUBCUTANEOUS at 11:06

## 2023-06-08 RX ADMIN — LEVETIRACETAM 500 MG: 500 TABLET, FILM COATED ORAL at 08:06

## 2023-06-08 RX ADMIN — POTASSIUM CHLORIDE 40 MEQ: 1500 TABLET, EXTENDED RELEASE ORAL at 10:06

## 2023-06-08 RX ADMIN — DEXTROSE MONOHYDRATE 100 MG: 50 INJECTION, SOLUTION INTRAVENOUS at 07:06

## 2023-06-08 RX ADMIN — Medication 9 MG: at 08:06

## 2023-06-08 RX ADMIN — HYDROMORPHONE HYDROCHLORIDE 1 MG: 1 INJECTION, SOLUTION INTRAMUSCULAR; INTRAVENOUS; SUBCUTANEOUS at 05:06

## 2023-06-08 RX ADMIN — PREDNISONE 50 MG: 50 TABLET ORAL at 03:06

## 2023-06-08 RX ADMIN — HYDROMORPHONE HYDROCHLORIDE 1 MG: 1 INJECTION, SOLUTION INTRAMUSCULAR; INTRAVENOUS; SUBCUTANEOUS at 10:06

## 2023-06-08 RX ADMIN — HYDROMORPHONE HYDROCHLORIDE 1 MG: 1 INJECTION, SOLUTION INTRAMUSCULAR; INTRAVENOUS; SUBCUTANEOUS at 08:06

## 2023-06-08 RX ADMIN — SODIUM CHLORIDE: 9 INJECTION, SOLUTION INTRAVENOUS at 03:06

## 2023-06-08 RX ADMIN — HYDROMORPHONE HYDROCHLORIDE 0.5 MG: 1 INJECTION, SOLUTION INTRAMUSCULAR; INTRAVENOUS; SUBCUTANEOUS at 03:06

## 2023-06-08 RX ADMIN — DOXYCYCLINE HYCLATE 100 MG: 100 TABLET, COATED ORAL at 08:06

## 2023-06-08 RX ADMIN — LEVETIRACETAM 500 MG: 5 INJECTION INTRAVENOUS at 08:06

## 2023-06-08 RX ADMIN — LIDOCAINE PATCH 5% 1 PATCH: 700 PATCH TOPICAL at 03:06

## 2023-06-08 RX ADMIN — HYDROMORPHONE HYDROCHLORIDE 1 MG: 1 INJECTION, SOLUTION INTRAMUSCULAR; INTRAVENOUS; SUBCUTANEOUS at 02:06

## 2023-06-08 RX ADMIN — SODIUM CHLORIDE: 9 INJECTION, SOLUTION INTRAVENOUS at 04:06

## 2023-06-08 NOTE — PROGRESS NOTES
"Nephrology Progress Note    Hospital course:  32-year-old  male with severe rhabdomyolysis and ischemic acute tubular necrosis.  Patient has required hemodialysis for clearance and ultrafiltration purposes.  Patient tolerated dialysis well yesterday with 1 L fluid removal.  He is not exhibited signs of meaningful renal function recovery.  Will hold hemodialysis today and will reassess for hemodialysis needs tomorrow.  Cardiology saw patient yesterday EF of 50% with the presence of ASD with a right-to-left shunt on Valsalva.    Subjective:   Patient relates that he feels weak this morning and appetite is still decreased.  Otherwise negative.    Twelve point review of systems positive for weakness and decreased appetite otherwise negative.    Objective:   BP (!) 164/90   Pulse 98   Temp 99 °F (37.2 °C)   Resp 18   Ht 5' 7" (1.702 m)   Wt 69.4 kg (153 lb)   SpO2 96%   BMI 23.96 kg/m²     Intake/Output Summary (Last 24 hours) at 6/8/2023 0927  Last data filed at 6/8/2023 0858  Gross per 24 hour   Intake 5613.12 ml   Output 2385 ml   Net 3228.12 ml        Physical exam:   General:  Patient is alert and oriented person place time answers questions slowly but appropriately  HEENT normocephalic atraumatic pupils equal round reactive to light  Neck: No JVD bruit thyromegaly adenopathy appreciated  Lungs:  Clear to auscultation bilaterally all fields  Cardiovascular regular rate and rhythm no murmur rub gallop appreciated  Abdomen: Positive bowel sounds all 4 quadrants soft nontender nondistended  Extremities: Trace edema of all extremities bilaterally  Neurologic:  Cranial nerves 2-12 grossly intact no clonus asterixis appreciated    Laboratory data:   Recent Results (from the past 24 hour(s))   Comprehensive Metabolic Panel (CMP)    Collection Time: 06/08/23  4:14 AM   Result Value Ref Range    Sodium Level 138 136 - 145 mmol/L    Potassium Level 3.4 (L) 3.5 - 5.1 mmol/L    Chloride 106 98 - 107 mmol/L    " Carbon Dioxide 22 22 - 29 mmol/L    Glucose Level 78 74 - 100 mg/dL    Blood Urea Nitrogen 29.3 (H) 8.9 - 20.6 mg/dL    Creatinine 4.03 (H) 0.73 - 1.18 mg/dL    Calcium Level Total 7.5 (L) 8.4 - 10.2 mg/dL    Protein Total 4.5 (L) 6.4 - 8.3 gm/dL    Albumin Level 1.6 (L) 3.5 - 5.0 g/dL    Globulin 2.9 2.4 - 3.5 gm/dL    Albumin/Globulin Ratio 0.6 (L) 1.1 - 2.0 ratio    Bilirubin Total 0.6 <=1.5 mg/dL    Alkaline Phosphatase 40 40 - 150 unit/L    Alanine Aminotransferase 202 (H) 0 - 55 unit/L    Aspartate Aminotransferase 195 (H) 5 - 34 unit/L    eGFR 19 mls/min/1.73/m2   Magnesium    Collection Time: 06/08/23  4:14 AM   Result Value Ref Range    Magnesium Level 1.80 1.60 - 2.60 mg/dL   Phosphorus    Collection Time: 06/08/23  4:14 AM   Result Value Ref Range    Phosphorus Level 2.7 2.3 - 4.7 mg/dL   APTT    Collection Time: 06/08/23  4:14 AM   Result Value Ref Range    PTT 31.3 <150.0 seconds   Vancomycin, Random    Collection Time: 06/08/23  4:14 AM   Result Value Ref Range    Vanc Lvl Random 17.5 15.0 - 20.0 ug/ml   CK    Collection Time: 06/08/23  4:14 AM   Result Value Ref Range    Creatine Kinase 3,429 (H) 30 - 200 U/L   CBC with Differential    Collection Time: 06/08/23  4:40 AM   Result Value Ref Range    WBC 20.58 (H) 4.50 - 11.50 x10(3)/mcL    RBC 3.78 (L) 4.70 - 6.10 x10(6)/mcL    Hgb 11.1 (L) 14.0 - 18.0 g/dL    Hct 33.4 (L) 42.0 - 52.0 %    MCV 88.4 80.0 - 94.0 fL    MCH 29.4 27.0 - 31.0 pg    MCHC 33.2 33.0 - 36.0 g/dL    RDW 12.8 11.5 - 17.0 %    Platelet 244 130 - 400 x10(3)/mcL    MPV 10.1 7.4 - 10.4 fL    Neut % 80.8 %    Lymph % 4.1 %    Mono % 14.0 %    Eos % 0.1 %    Basophil % 0.2 %    Lymph # 0.85 0.6 - 4.6 x10(3)/mcL    Neut # 16.61 (H) 2.1 - 9.2 x10(3)/mcL    Mono # 2.88 (H) 0.1 - 1.3 x10(3)/mcL    Eos # 0.03 0 - 0.9 x10(3)/mcL    Baso # 0.04 <=0.2 x10(3)/mcL    IG# 0.17 (H) 0 - 0.04 x10(3)/mcL    IG% 0.8 %    NRBC% 0.0 %   RT Blood Gas    Collection Time: 06/08/23  7:06 AM   Result Value Ref  Range    Sample Type Arterial Blood     pH, Blood gas 7.410 7.350 - 7.450    pCO2, Blood gas 38.0 35.0 - 45.0 mmHg    pO2, Blood gas 73.0 (L) 75.0 - 100.0 mmHg    TOC2, Blood gas 25.3 22.0 - 26.0 mmol/L    Base Excess, Blood gas -0.40 -2.00 - 2.00 mmol/L    sO2, Blood gas 96.9 >=90.0 %    HCO3, Blood gas 24.1 22.0 - 26.0 mmol/L    pAO2 666 mmHg    A-aDO2 593 mmHg    THb, Blood gas 11.6 (L) 12 - 18 g/dL    O2 Hb, Blood Gas 94.3 94.0 - 100.0 %    CO Hgb 1.8 (H) 0.5 - 1.5 %    Met Hgb 0.8 0 - 1.5 %    Oxygen Device, Blood gas High Flow Cannula     LPM 40.0     FIO2, Blood gas 100.0 %       Imaging:   Imaging Results              CT Chest Abdomen Pelvis Without Contrast (XPD) (Final result)  Result time 06/02/23 18:40:12      Final result by Arley Cuevas MD (06/02/23 18:40:12)                   Impression:      1. Multifocal lung consolidation, likely pneumonia.  2. Small volume ascites with nonspecific gallbladder wall thickening.  Some of the ascitic fluid in the pelvis appears complex.  3. Mild hepatomegaly.      Electronically signed by: Arley Cuevas  Date:    06/02/2023  Time:    18:40               Narrative:    EXAMINATION:  CT CHEST ABDOMEN PELVIS WITHOUT CONTRAST(XPD)    CLINICAL HISTORY:  Sepsis; Sepsis, unspecified organism    TECHNIQUE:  CT imaging from the chest through the pelvis without IV contrast. Axial, coronal and sagittal reformatted images reviewed. Dose length product 263 mGycm. Automatic exposure control, adjustment of mA/kV or iterative reconstruction technique used to limit radiation dose.    COMPARISON:  No relevant comparison studies available at the time of dictation.    FINDINGS:  Assessment of the visceral organs and vasculature is limited by the lack of IV contrast.    Lung and large airways: Multifocal consolidation in both lungs, greatest in the lower lobes.    Pleura: No significant pleural fluid.    Mediastinum and nae: Normal heart size.  No pathologically enlarged lymph node  identified.    Chest wall/axilla and lower neck: No significant findings.    Liver/biliary: Mild hepatomegaly.  Suspected gallbladder wall thickening.  No biliary dilatation.    Pancreas: Normal.    Spleen: Normal.    Adrenals: Normal.    Genitourinary: Right kidney not identified.  No urinary stone or hydronephrosis on the left.  Normal bladder.    Stomach/bowel: No bowel obstruction.    Abdominal/pelvic lymph nodes and peritoneum: No pathologically enlarged lymph node identified. Small volume ascites with increased fluid density in portions of the pelvis.    Abdominal and pelvic vasculature: Normal abdominal aortic caliber.    Abdominal wall: High-riding right testicle along the right inguinal canal.    Musculoskeletal: No acute osseous findings.                                       US Retroperitoneal Limited (Final result)  Result time 06/02/23 16:31:06      Final result by Familia John MD (06/02/23 16:31:06)                   Narrative:    EXAMINATION  US RETROPERITONEAL LIMITED    CLINICAL HISTORY  lalita;    TECHNIQUE  Multiplanar grayscale sonographic images were obtained of the retroperitoneum.    COMPARISON  None available at the time of initial interpretation.    FINDINGS  Exam quality: adequate for evaluation    Kidneys: The right kidney is not visualized, with no focal or otherwise suspicious findings of the ipsilateral retroperitoneal region.  The left kidney measures 14.1 cm x 7.2 cm x 6.3 cm.  The left renal parenchyma is homogeneous and normal by sonographic appearance, with smooth marginated cortex.  No masses or complex cysts are appreciated.  No collecting system dilatation.  No shadowing calcification is identified.  No perinephric collection or free abdominal fluid.    Miscellaneous: There is incidentally appreciated fluid partially visualized through the left abdominal cavity.  Scattered internal punctate echogenic foci may represent associated debris.    IMPRESSION  1. Solitary left kidney,  without sonographic findings of acute urologic abnormality.  2. Partially visualized fluid with echogenic debris at the left hemiabdomen is nonspecific; differential includes complex ascites or collection, versus distended stomach/bowel.      Electronically signed by: Familia John  Date:    06/02/2023  Time:    16:31                                     X-Ray Chest AP Portable (Final result)  Result time 06/02/23 13:48:55      Final result by Franco Gonzáles MD (06/02/23 13:48:55)                   Impression:      Findings concerning for right mid to lower lung infectious process.      Electronically signed by: Franco Gonzáles  Date:    06/02/2023  Time:    13:48               Narrative:    EXAMINATION:  XR CHEST AP PORTABLE    CLINICAL HISTORY:  Sepsis;    TECHNIQUE:  Single view of the chest    COMPARISON:  No prior imaging available for comparison.    FINDINGS:  Increased interstitial markings in the right greater than left lung.    The cardiomediastinal silhouette is within normal limits.    No acute osseous abnormality.                                       Medications:     Current Facility-Administered Medications:     0.9%  NaCl infusion, , Intravenous, Continuous, VICTORIA Hill, Last Rate: 100 mL/hr at 06/08/23 0610, Rate Verify at 06/08/23 0610    calcium gluconate 1 g in NS IVPB (premixed), 1 g, Intravenous, PRN, Lm Yanes MD, Stopped at 06/04/23 0346    calcium gluconate 1 g in NS IVPB (premixed), 2 g, Intravenous, PRN, Lm Yanes MD    calcium gluconate 1 g in NS IVPB (premixed), 3 g, Intravenous, PRN, Lm Yanes MD    dextrose 10% bolus 125 mL 125 mL, 12.5 g, Intravenous, PRN, Alda Valladares MD, Stopped at 06/02/23 2234    dextrose 40 % gel 15,000 mg, 15 g, Oral, PRN, Alda Valladares MD    dextrose 40 % gel 30,000 mg, 30 g, Oral, PRN, Alda Valladares MD    doxycycline (VIBRAMYCIN) 100 mg in dextrose 5 % (D5W) 100 mL IVPB, 100 mg, Intravenous, Q12H, Dominic Armijo MD, Stopped at  06/08/23 0826    glucagon (human recombinant) injection 1 mg, 1 mg, Intramuscular, PRN, Alda Valladares MD    HYDROmorphone injection 1 mg, 1 mg, Intravenous, Q3H PRN, Dominic Armijo MD, 1 mg at 06/08/23 0821    insulin regular injection 6.94 Units 0.0694 mL, 0.1 Units/kg, Intravenous, PRN, Dominic Armijo MD, 6.94 Units at 06/02/23 2112    levETIRAcetam in NaCl (iso-os) IVPB 500 mg, 500 mg, Intravenous, Q12H, Dillon Dominguez MD, Stopped at 06/08/23 0843    LIDOcaine 5 % patch 1 patch, 1 patch, Transdermal, Q24H, Dillon Dominguez MD, 1 patch at 06/07/23 1602    melatonin tablet 9 mg, 9 mg, Oral, Nightly PRN, Adolfo Turcios DO, 9 mg at 06/07/23 2106    naloxone 0.4 mg/mL injection 0.02 mg, 0.02 mg, Intravenous, PRN, Alda Valladares MD    piperacillin-tazobactam (ZOSYN) 4.5 g in dextrose 5 % in water (D5W) 5 % 100 mL IVPB (MB+), 4.5 g, Intravenous, Q12H, Dominic Armijo MD, Last Rate: 25 mL/hr at 06/08/23 0610, Rate Verify at 06/08/23 0610    prochlorperazine tablet 10 mg, 10 mg, Oral, TID PRN, Dillon Dominguez MD, 10 mg at 06/08/23 0813    QUEtiapine tablet 50 mg, 50 mg, Oral, Daily, Dillon Dominguez MD, 50 mg at 06/08/23 0813    sodium chloride 0.9% flush 10 mL, 10 mL, Intravenous, Q12H PRN, Alda Valladares MD    Pharmacy to dose Vancomycin consult, , , Once **AND** vancomycin - pharmacy to dose, , Intravenous, pharmacy to manage frequency, Alda Valladares MD     Impression/Plan:  Acute nonoliguric renal failure:  Likely due to ischemic acute tubular necrosis due to hypotension hypoperfusion initially as well as toxic ATN due to pigment nephropathy from rhabdomyolysis.  Patient is still requiring hemodialysis both for clearance and ultrafiltration.  Will hold hemodialysis today and reassess tomorrow for meaningful renal function recovery.  Hypokalemia: Agree with repletion.  Will recheck CMP in a.m.  Rhabdomyolysis:  CPK decreased significantly from admission.  Christal Forte M.D.  Nephrology

## 2023-06-08 NOTE — PROGRESS NOTES
[facility] - Intensive Care Unit    Progress Note      Patient Name: Ryan Wild  MRN: [id number]  Admission Date: 6/2/2023  Attending Physician: Celso Busby MD   Primary Care Provider: Adilene Dillon NP    Patient information was obtained from patient and ER records.     Subjective:     Principal Problem:Non-traumatic rhabdomyolysis    Chief Complaint:   Chief Complaint   Patient presents with    Back Pain    Hypotension    Hearing Problem     PT REPORTS WAKING UP W LOWER BACK PAIN, WEAKNESS, SOB AND HEARING LOSS. 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  DENIES DRUG USE. HX OF SEIZURES, NON COMPLIANT W MEDS > 1 MONTH.  .  EKG OBTAINED.         HPI: Ryan Wild is a 32 y.o. male with history of seizures (off Keppra), drug use, hepatitis C (treated), and solitary kidney who presented to the ED on 6/2/2023  with a primary complaint of back pain. Patient is a  who had worked a 12 hour shift out in the sun the day prior. States that he went to bed feeling great, without any complaints. Then he woke up late for work day of admission and admits to not remembering much of what happened afterwards. His coworkers brought him to the ED. Admits to severe back pain that is aggravated by movement, weakness, shortness of breath. Also has not urinated all day. Denies chest pain, palpitations, headache, nausea, vomiting, abdominal pain, fevers. Last IV drug use 1 year ago, but snorted meth 2 days ago. Also uses IM steroids, last injection last night. Has not taken his home Keppra for the past few months; his last seizure was on 8/2022.      In the ED, patient presented hypotensive, tachycardic, SpO2 89% on RA. He was placed on bipap. Vasopressors were started when patient continued to be hypotensive with IVF. Labs significant for WBC 42.1, K 6.9, CO2 14, Cr 3.77, , . CPK 37k. LDH 2,2k. CBG normal. Troponin 1.878,  EKG no ischemic changes. Lactic 8.0. CXR  with increased interstitial markings in the right greater than left lung. Bedside Echo no abnormalities. Pt was given Vanc/Zosyn x1, and 4L bolus NS total. Medicine was called to admit patient for septic shock with multiorgan dysfunction.    Hospital course:  06/03/2023: Patient started on HD emergently, Levophed, Precedex, BiPAP, good response to Lasix  06/04/2023:  Recommend repeat HD today, nephro to see, off Levophed, continue Precedex, BiPAP, consider proning after dialysis, if no dialysis planned we will give repeat dose of Lasix.  6.5.23: No acute events overnight, patient remains on Precedex at max. Concerned about his status and why he is still in-house. Unable to tolerate vapotherm or proning for any appreciable amount of time. Currently being dialyzed.   6.6.23: NAEON. HD yesterday. Removed 0.5-1L ultrafiltrate. Well tolerated.  Able to wean off BIPAP to CPAP. HFNC at supper time and able to tolerate small meal. Back on CPAP at night. Still requiring sedation with Precedex. Currently on Vapotherm at 100% O2.  6.7.23: Desatted to low 79-80s on vapotherm. Initially refused to be placed back CPAP. However, was reasonable to be return to it after speaking with ICU nursing staff. Has been irritable and still reports he is in pain. Received Morphine x1. Still on Precedex 1.4mcg. Appears untouched. Has Net positive fluid balance.     Interval history:  Dialyzed yesterday morning. Removed approx 1.5L. Had an additional 850mL in urinary output. Tolerated Vapotherm. O2 sats remained in <90s.   CK improved prior to dialyzing. He still reports significant back pain requiring scheduled dilaudid. No longer on Precedex.   Has an appetite, but not eating much.   Past Medical History:   Diagnosis Date    Depression     Opioid abuse     Seizures     Solitary kidney, congenital     Unspecified viral hepatitis C without hepatic coma        Past Surgical History:   Procedure  Laterality Date    TONSILLECTOMY         Review of patient's allergies indicates:  No Known Allergies    No current facility-administered medications on file prior to encounter.     Current Outpatient Medications on File Prior to Encounter   Medication Sig    albuterol (PROVENTIL/VENTOLIN HFA) 90 mcg/actuation inhaler INHALE 2 PUFFS BY MOUTH EVERY 6 HOURS AS NEEDED FOR SHORTNESS OF BREATH OR WHEEZING    fluticasone propionate (FLONASE) 50 mcg/actuation nasal spray SHAKE LIQUID AND USE 2 SPRAYS IN EACH NOSTRIL DAILY    levETIRAcetam (KEPPRA) 500 MG Tab Take 1 tablet by mouth 2 (two) times daily.    QUEtiapine (SEROQUEL) 100 MG Tab Take 1 tablet (100 mg total) by mouth nightly.    valACYclovir (VALTREX) 1000 MG tablet Take 1,000 mg by mouth 3 (three) times daily.    venlafaxine (EFFEXOR-XR) 37.5 MG 24 hr capsule Take 37.5 mg by mouth every morning.     Family History       Problem Relation (Age of Onset)    Cerebral aneurysm Father          Tobacco Use    Smoking status: Every Day     Types: Vaping with nicotine    Smokeless tobacco: Current   Substance and Sexual Activity    Alcohol use: Not Currently    Drug use: Not Currently     Types: Heroin, Methamphetamines     Comment: 3 years sober    Sexual activity: Yes     Partners: Female     Review of Systems   Constitutional:  Negative for chills and fever.   Gastrointestinal:  Negative for abdominal pain, nausea and vomiting.   Genitourinary:  Negative for difficulty urinating.   Musculoskeletal:  Positive for back pain.   Neurological:  Negative for headaches.   Psychiatric/Behavioral:  Negative for hallucinations. The patient is nervous/anxious.    Objective:     Vital Signs (Most Recent):  Temp: 99 °F (37.2 °C) (06/08/23 0703)  Pulse: 98 (06/08/23 0800)  Resp: 18 (06/08/23 0821)  BP: (!) 164/90 (06/08/23 0800)  SpO2: 96 % (06/08/23 0800) Vital Signs (24h Range):  Temp:  [98.2 °F (36.8 °C)-101.5 °F (38.6 °C)] 99 °F (37.2 °C)  Pulse:  [] 98  Resp:  [18-33]  18  SpO2:  [88 %-97 %] 96 %  BP: (118-176)/(51-92) 164/90     Weight: 69.4 kg (153 lb)  Body mass index is 23.96 kg/m².    Physical Exam  Constitutional:       General: He is awake. He is in acute distress.      Appearance: Normal appearance. He is normal weight. He is ill-appearing and diaphoretic.      Comments: Lying in bed on Vapotherm. Sleeping.   Cardiovascular:      Rate and Rhythm: Normal rate and regular rhythm.      Pulses: Normal pulses.           Dorsalis pedis pulses are 2+ on the right side and 2+ on the left side.      Heart sounds: Normal heart sounds.      Arteriovenous access: Right and left arteriovenous access is present.  Pulmonary:      Effort: Pulmonary effort is normal.      Breath sounds: Normal air entry. Wheezing present.   Abdominal:      General: Abdomen is flat. Bowel sounds are normal.      Palpations: Abdomen is soft.      Tenderness: There is no abdominal tenderness. There is no guarding.   Musculoskeletal:      Lumbar back: Deformity and tenderness present. No signs of trauma or lacerations.      Right knee: Normal.      Left knee: Normal.      Right lower leg: Tenderness present. No edema.      Left lower leg: Tenderness present. No edema.      Right foot: Swelling present. No deformity.      Left foot: Swelling present.      Comments: Lidocaine patch in place.   Feet:      Right foot:      Skin integrity: Warmth present.      Left foot:      Skin integrity: Skin integrity normal.   Skin:     General: Skin is warm.      Capillary Refill: Capillary refill takes 2 to 3 seconds.      Coloration: Skin is not cyanotic, jaundiced or mottled.   Neurological:      Mental Status: He is alert and oriented to person, place, and time.      GCS: GCS eye subscore is 4. GCS verbal subscore is 5. GCS motor subscore is 6.   Psychiatric:         Behavior: Behavior is cooperative.          Significant Labs: All pertinent labs within the past 24 hours have been reviewed.    Significant Imaging: I have  reviewed all pertinent imaging results/findings within the past 24 hours.    Assessment/Plan:   Methamphetamine/fentanyl overdose  Acute toxic metabolic encephalopathy secondary to above  Acute renal failure stage III secondary to rhabdo secondary to above- Improving  History of polysubstance use disorder  History of anabolic steroid abuse  Pneumonia-community-acquired with possible component of aspiration  Sepsis secondary to above- Improving  Leukocytosis-Stable  Hyperkalemia- Resolved with dialysis  Severe fluid overload- Improving  NSTEMI type 2, however given high peak concern for type 1  Shock- Resolved      - Continue ICU level of care. D/c Precedex  -Continuing slow NIPAP wean. Currently on Vapotherm at 100% FiO2. % Saturations low to mid 90s.  -Nephrology following. No plans for HD today.   -Echo 50%EF. Positive intracardiac shunt. Cardiology following. Possible EUGENIA once hemodynamically stable.   -Repeat CXR 6.8.23. See my read above.  - Off levophed. Hypertensive (MAP >100).   -fluids per Nephrology on NS at 100 cc/hr.  - Continue Keppra 500mg IV. Keppra level pending. No seizure activity to date.  -Discontinued Heparin.  - continue vanc/Zosyn/doxy with renal dosing day 6. Vanc 500mg pulse dose.     CODE STATUS: FULL   Access: HD line, art line, PIV  Antibiotics: Doxycycline, Vanc and Zosyn  Diet: Regular  DVT Prophylaxis: None   GI Prophylaxis: None  Sedation: None  Fluids: NS at 100cc/hr       Disposition: Continue Oxygen status monitoring. Wean NIPAP as tolerated. HD per nephrology recs. IV antibiotics and pain medication as needed.    Dillon Dominguez MD  Department of Hospital Medicine

## 2023-06-09 LAB
ALBUMIN SERPL-MCNC: 1.7 G/DL (ref 3.5–5)
ALBUMIN/GLOB SERPL: 0.5 RATIO (ref 1.1–2)
ALP SERPL-CCNC: 46 UNIT/L (ref 40–150)
ALT SERPL-CCNC: 184 UNIT/L (ref 0–55)
APTT PPP: 28.6 SECONDS
AST SERPL-CCNC: 129 UNIT/L (ref 5–34)
BASOPHILS # BLD AUTO: 0.03 X10(3)/MCL
BASOPHILS NFR BLD AUTO: 0.1 %
BILIRUBIN DIRECT+TOT PNL SERPL-MCNC: 0.5 MG/DL
BUN SERPL-MCNC: 36 MG/DL (ref 8.9–20.6)
CALCIUM SERPL-MCNC: 8.2 MG/DL (ref 8.4–10.2)
CHLORIDE SERPL-SCNC: 106 MMOL/L (ref 98–107)
CO2 SERPL-SCNC: 23 MMOL/L (ref 22–29)
CREAT SERPL-MCNC: 4.44 MG/DL (ref 0.73–1.18)
EOSINOPHIL # BLD AUTO: 0 X10(3)/MCL (ref 0–0.9)
EOSINOPHIL NFR BLD AUTO: 0 %
ERYTHROCYTE [DISTWIDTH] IN BLOOD BY AUTOMATED COUNT: 12.9 % (ref 11.5–17)
GFR SERPLBLD CREATININE-BSD FMLA CKD-EPI: 17 MLS/MIN/1.73/M2
GLOBULIN SER-MCNC: 3.3 GM/DL (ref 2.4–3.5)
GLUCOSE SERPL-MCNC: 121 MG/DL (ref 74–100)
HCT VFR BLD AUTO: 35.1 % (ref 42–52)
HGB BLD-MCNC: 11.5 G/DL (ref 14–18)
IMM GRANULOCYTES # BLD AUTO: 0.45 X10(3)/MCL (ref 0–0.04)
IMM GRANULOCYTES NFR BLD AUTO: 2 %
LYMPHOCYTES # BLD AUTO: 0.59 X10(3)/MCL (ref 0.6–4.6)
LYMPHOCYTES NFR BLD AUTO: 2.6 %
MAGNESIUM SERPL-MCNC: 2 MG/DL (ref 1.6–2.6)
MCH RBC QN AUTO: 29.3 PG (ref 27–31)
MCHC RBC AUTO-ENTMCNC: 32.8 G/DL (ref 33–36)
MCV RBC AUTO: 89.5 FL (ref 80–94)
MONOCYTES # BLD AUTO: 1.85 X10(3)/MCL (ref 0.1–1.3)
MONOCYTES NFR BLD AUTO: 8.2 %
NEUTROPHILS # BLD AUTO: 19.69 X10(3)/MCL (ref 2.1–9.2)
NEUTROPHILS NFR BLD AUTO: 87.1 %
NRBC BLD AUTO-RTO: 0 %
PHOSPHATE SERPL-MCNC: 3.5 MG/DL (ref 2.3–4.7)
PLATELET # BLD AUTO: 249 X10(3)/MCL (ref 130–400)
PMV BLD AUTO: 10 FL (ref 7.4–10.4)
POTASSIUM SERPL-SCNC: 4.2 MMOL/L (ref 3.5–5.1)
PROT SERPL-MCNC: 5 GM/DL (ref 6.4–8.3)
RBC # BLD AUTO: 3.92 X10(6)/MCL (ref 4.7–6.1)
SODIUM SERPL-SCNC: 138 MMOL/L (ref 136–145)
VANCOMYCIN SERPL-MCNC: 11.2 UG/ML (ref 15–20)
WBC # SPEC AUTO: 22.61 X10(3)/MCL (ref 4.5–11.5)

## 2023-06-09 PROCEDURE — 80053 COMPREHEN METABOLIC PANEL: CPT

## 2023-06-09 PROCEDURE — 25000003 PHARM REV CODE 250: Performed by: FAMILY MEDICINE

## 2023-06-09 PROCEDURE — 25000003 PHARM REV CODE 250: Performed by: INTERNAL MEDICINE

## 2023-06-09 PROCEDURE — 85730 THROMBOPLASTIN TIME PARTIAL: CPT | Performed by: STUDENT IN AN ORGANIZED HEALTH CARE EDUCATION/TRAINING PROGRAM

## 2023-06-09 PROCEDURE — 25000003 PHARM REV CODE 250: Performed by: STUDENT IN AN ORGANIZED HEALTH CARE EDUCATION/TRAINING PROGRAM

## 2023-06-09 PROCEDURE — 84100 ASSAY OF PHOSPHORUS: CPT

## 2023-06-09 PROCEDURE — 63600175 PHARM REV CODE 636 W HCPCS: Performed by: INTERNAL MEDICINE

## 2023-06-09 PROCEDURE — 94761 N-INVAS EAR/PLS OXIMETRY MLT: CPT

## 2023-06-09 PROCEDURE — 85025 COMPLETE CBC W/AUTO DIFF WBC: CPT | Performed by: STUDENT IN AN ORGANIZED HEALTH CARE EDUCATION/TRAINING PROGRAM

## 2023-06-09 PROCEDURE — 21400001 HC TELEMETRY ROOM

## 2023-06-09 PROCEDURE — 25000003 PHARM REV CODE 250

## 2023-06-09 PROCEDURE — 63600175 PHARM REV CODE 636 W HCPCS: Performed by: FAMILY MEDICINE

## 2023-06-09 PROCEDURE — 27100171 HC OXYGEN HIGH FLOW UP TO 24 HOURS

## 2023-06-09 PROCEDURE — 63600175 PHARM REV CODE 636 W HCPCS: Performed by: STUDENT IN AN ORGANIZED HEALTH CARE EDUCATION/TRAINING PROGRAM

## 2023-06-09 PROCEDURE — 80202 ASSAY OF VANCOMYCIN: CPT | Performed by: INTERNAL MEDICINE

## 2023-06-09 PROCEDURE — 83735 ASSAY OF MAGNESIUM: CPT

## 2023-06-09 PROCEDURE — 90935 HEMODIALYSIS ONE EVALUATION: CPT

## 2023-06-09 PROCEDURE — 63600175 PHARM REV CODE 636 W HCPCS

## 2023-06-09 PROCEDURE — 25000003 PHARM REV CODE 250: Performed by: NURSE PRACTITIONER

## 2023-06-09 PROCEDURE — 94799 UNLISTED PULMONARY SVC/PX: CPT

## 2023-06-09 RX ORDER — HYDROMORPHONE HYDROCHLORIDE 1 MG/ML
0.5 INJECTION, SOLUTION INTRAMUSCULAR; INTRAVENOUS; SUBCUTANEOUS
Status: DISCONTINUED | OUTPATIENT
Start: 2023-06-09 | End: 2023-06-14

## 2023-06-09 RX ORDER — HYDROCODONE BITARTRATE AND ACETAMINOPHEN 10; 325 MG/1; MG/1
1 TABLET ORAL EVERY 6 HOURS PRN
Status: DISCONTINUED | OUTPATIENT
Start: 2023-06-09 | End: 2023-06-15

## 2023-06-09 RX ORDER — TAMSULOSIN HYDROCHLORIDE 0.4 MG/1
0.4 CAPSULE ORAL DAILY
Status: DISCONTINUED | OUTPATIENT
Start: 2023-06-09 | End: 2023-06-16 | Stop reason: HOSPADM

## 2023-06-09 RX ADMIN — LEVETIRACETAM 500 MG: 500 TABLET, FILM COATED ORAL at 09:06

## 2023-06-09 RX ADMIN — HYDROCODONE BITARTRATE AND ACETAMINOPHEN 1 TABLET: 10; 325 TABLET ORAL at 10:06

## 2023-06-09 RX ADMIN — VANCOMYCIN HYDROCHLORIDE 500 MG: 500 INJECTION, POWDER, LYOPHILIZED, FOR SOLUTION INTRAVENOUS at 05:06

## 2023-06-09 RX ADMIN — PIPERACILLIN AND TAZOBACTAM 4.5 G: 4; .5 INJECTION, POWDER, LYOPHILIZED, FOR SOLUTION INTRAVENOUS; PARENTERAL at 07:06

## 2023-06-09 RX ADMIN — HYDROMORPHONE HYDROCHLORIDE 1 MG: 1 INJECTION, SOLUTION INTRAMUSCULAR; INTRAVENOUS; SUBCUTANEOUS at 05:06

## 2023-06-09 RX ADMIN — HYDROMORPHONE HYDROCHLORIDE 1 MG: 1 INJECTION, SOLUTION INTRAMUSCULAR; INTRAVENOUS; SUBCUTANEOUS at 12:06

## 2023-06-09 RX ADMIN — VANCOMYCIN HYDROCHLORIDE 500 MG: 500 INJECTION, POWDER, LYOPHILIZED, FOR SOLUTION INTRAVENOUS at 10:06

## 2023-06-09 RX ADMIN — Medication 9 MG: at 11:06

## 2023-06-09 RX ADMIN — DOXYCYCLINE HYCLATE 100 MG: 100 TABLET, COATED ORAL at 08:06

## 2023-06-09 RX ADMIN — PIPERACILLIN AND TAZOBACTAM 4.5 G: 4; .5 INJECTION, POWDER, LYOPHILIZED, FOR SOLUTION INTRAVENOUS; PARENTERAL at 05:06

## 2023-06-09 RX ADMIN — HYDROCODONE BITARTRATE AND ACETAMINOPHEN 1 TABLET: 10; 325 TABLET ORAL at 11:06

## 2023-06-09 RX ADMIN — HYDROMORPHONE HYDROCHLORIDE 1 MG: 1 INJECTION, SOLUTION INTRAMUSCULAR; INTRAVENOUS; SUBCUTANEOUS at 02:06

## 2023-06-09 RX ADMIN — LEVETIRACETAM 500 MG: 500 TABLET, FILM COATED ORAL at 08:06

## 2023-06-09 RX ADMIN — HYDROMORPHONE HYDROCHLORIDE 1 MG: 1 INJECTION, SOLUTION INTRAMUSCULAR; INTRAVENOUS; SUBCUTANEOUS at 08:06

## 2023-06-09 RX ADMIN — PREDNISONE 50 MG: 50 TABLET ORAL at 08:06

## 2023-06-09 RX ADMIN — HYDROMORPHONE HYDROCHLORIDE 0.5 MG: 1 INJECTION, SOLUTION INTRAMUSCULAR; INTRAVENOUS; SUBCUTANEOUS at 09:06

## 2023-06-09 RX ADMIN — SODIUM CHLORIDE: 9 INJECTION, SOLUTION INTRAVENOUS at 01:06

## 2023-06-09 RX ADMIN — HYDROMORPHONE HYDROCHLORIDE 1 MG: 1 INJECTION, SOLUTION INTRAMUSCULAR; INTRAVENOUS; SUBCUTANEOUS at 03:06

## 2023-06-09 RX ADMIN — TAMSULOSIN HYDROCHLORIDE 0.4 MG: 0.4 CAPSULE ORAL at 08:06

## 2023-06-09 RX ADMIN — HYDROCODONE BITARTRATE AND ACETAMINOPHEN 1 TABLET: 10; 325 TABLET ORAL at 05:06

## 2023-06-09 RX ADMIN — HYDROMORPHONE HYDROCHLORIDE 1 MG: 1 INJECTION, SOLUTION INTRAMUSCULAR; INTRAVENOUS; SUBCUTANEOUS at 07:06

## 2023-06-09 RX ADMIN — QUETIAPINE FUMARATE 100 MG: 100 TABLET, FILM COATED ORAL at 11:06

## 2023-06-09 NOTE — PROGRESS NOTES
[facility] - Hospital Medicine    Progress Note      Patient Name: Ryan Wild  MRN: [id number]  Admission Date: 6/2/2023  Attending Physician: Celso Busby MD   Primary Care Provider: Adilene Dillon NP    Patient information was obtained from patient and ER records.     Subjective:     Principal Problem:Non-traumatic rhabdomyolysis    Chief Complaint:   Chief Complaint   Patient presents with    Back Pain    Hypotension    Hearing Problem     PT REPORTS WAKING UP W LOWER BACK PAIN, WEAKNESS, SOB AND HEARING LOSS. 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  DENIES DRUG USE. HX OF SEIZURES, NON COMPLIANT W MEDS > 1 MONTH.  .  EKG OBTAINED.         HPI: Ryan Wild is a 32 y.o. male with history of seizures (off Keppra), drug use, hepatitis C (treated), and solitary kidney who presented to the ED on 6/2/2023  with a primary complaint of back pain. Patient is a  who had worked a 12 hour shift out in the sun the day prior. States that he went to bed feeling great, without any complaints. Then he woke up late for work day of admission and admits to not remembering much of what happened afterwards. His coworkers brought him to the ED. Admits to severe back pain that is aggravated by movement, weakness, shortness of breath. Also has not urinated all day. Denies chest pain, palpitations, headache, nausea, vomiting, abdominal pain, fevers. Last IV drug use 1 year ago, but snorted meth 2 days ago. Also uses IM steroids, last injection last night. Has not taken his home Keppra for the past few months; his last seizure was on 8/2022.      In the ED, patient presented hypotensive, tachycardic, SpO2 89% on RA. He was placed on bipap. Vasopressors were started when patient continued to be hypotensive with IVF. Labs significant for WBC 42.1, K 6.9, CO2 14, Cr 3.77, , . CPK 37k. LDH 2,2k. CBG normal. Troponin 1.878,  EKG no ischemic changes. Lactic 8.0. CXR  with increased interstitial markings in the right greater than left lung. Bedside Echo no abnormalities. Pt was given Vanc/Zosyn x1, and 4L bolus NS total. Medicine was called to admit patient for septic shock with multiorgan dysfunction.    Hospital course:  06/03/2023: Patient started on HD emergently, Levophed, Precedex, BiPAP, good response to Lasix  06/04/2023:  Recommend repeat HD today, nephro to see, off Levophed, continue Precedex, BiPAP, consider proning after dialysis, if no dialysis planned we will give repeat dose of Lasix.  6.5.23: No acute events overnight, patient remains on Precedex at max. Concerned about his status and why he is still in-house. Unable to tolerate vapotherm or proning for any appreciable amount of time. Currently being dialyzed.   6.6.23: NAEON. HD yesterday. Removed 0.5-1L ultrafiltrate. Well tolerated.  Able to wean off BIPAP to CPAP. HFNC at supper time and able to tolerate small meal. Back on CPAP at night. Still requiring sedation with Precedex. Currently on Vapotherm at 100% O2.  6.7.23: Desatted to low 79-80s on vapotherm. Initially refused to be placed back CPAP. However, was reasonable to be return to it after speaking with ICU nursing staff. Has been irritable and still reports he is in pain. Received Morphine x1. Still on Precedex 1.4mcg. Appears untouched. Has Net positive fluid balance.   6.8.23: Dialyzed yesterday morning. Removed approx 1.5L. Had an additional 850mL in urinary output. Tolerated Vapotherm. O2 sats remained in <90s.   CK improved prior to dialyzing. He still reports significant back pain requiring scheduled dilaudid. No longer on Precedex.   Has an appetite, but not eating much.     Interval history:  Downgraded to tele. Feels his breathing is better, however is more swollen than normal. Complaints of low back and bilateral limb pain. Requesting Dilaudid every three hours. Appetite improving. Urinating, however  states he doesn't know when happening.  Past Medical History:   Diagnosis Date    Depression     Opioid abuse     Seizures     Solitary kidney, congenital     Unspecified viral hepatitis C without hepatic coma        Past Surgical History:   Procedure Laterality Date    TONSILLECTOMY         Review of patient's allergies indicates:  No Known Allergies    No current facility-administered medications on file prior to encounter.     Current Outpatient Medications on File Prior to Encounter   Medication Sig    albuterol (PROVENTIL/VENTOLIN HFA) 90 mcg/actuation inhaler INHALE 2 PUFFS BY MOUTH EVERY 6 HOURS AS NEEDED FOR SHORTNESS OF BREATH OR WHEEZING    fluticasone propionate (FLONASE) 50 mcg/actuation nasal spray SHAKE LIQUID AND USE 2 SPRAYS IN EACH NOSTRIL DAILY    levETIRAcetam (KEPPRA) 500 MG Tab Take 1 tablet by mouth 2 (two) times daily.    QUEtiapine (SEROQUEL) 100 MG Tab Take 1 tablet (100 mg total) by mouth nightly.    valACYclovir (VALTREX) 1000 MG tablet Take 1,000 mg by mouth 3 (three) times daily.    venlafaxine (EFFEXOR-XR) 37.5 MG 24 hr capsule Take 37.5 mg by mouth every morning.     Family History       Problem Relation (Age of Onset)    Cerebral aneurysm Father          Tobacco Use    Smoking status: Every Day     Types: Vaping with nicotine    Smokeless tobacco: Current   Substance and Sexual Activity    Alcohol use: Not Currently    Drug use: Not Currently     Types: Heroin, Methamphetamines     Comment: 3 years sober    Sexual activity: Yes     Partners: Female     Review of Systems   Constitutional:  Negative for chills and fever.   Gastrointestinal:  Negative for abdominal pain, nausea and vomiting.   Genitourinary:  Negative for difficulty urinating.   Musculoskeletal:  Positive for back pain.   Neurological:  Negative for headaches.   Psychiatric/Behavioral:  Negative for hallucinations. The patient is nervous/anxious.    Objective:     Vital Signs (Most Recent):  Temp: 98.9 °F (37.2 °C)  (06/09/23 0402)  Pulse: 93 (06/09/23 0402)  Resp: 18 (06/09/23 0516)  BP: (!) 149/86 (06/09/23 0402)  SpO2: 100 % (06/09/23 0402) Vital Signs (24h Range):  Temp:  [98.6 °F (37 °C)-99.2 °F (37.3 °C)] 98.9 °F (37.2 °C)  Pulse:  [] 93  Resp:  [18-31] 18  SpO2:  [92 %-100 %] 100 %  BP: (141-176)/() 149/86     Weight: 69.4 kg (153 lb)  Body mass index is 23.96 kg/m².    Physical Exam  Constitutional:       General: He is awake. He is not in acute distress.     Appearance: Normal appearance. He is normal weight. He is ill-appearing and diaphoretic.      Comments: Lying in bed on Vapotherm. Sleeping.   Eyes:      Extraocular Movements: Extraocular movements intact.      Conjunctiva/sclera: Conjunctivae normal.   Cardiovascular:      Rate and Rhythm: Normal rate and regular rhythm.      Pulses: Normal pulses.           Dorsalis pedis pulses are 2+ on the right side and 2+ on the left side.      Heart sounds: Normal heart sounds.      Arteriovenous access: Right and left arteriovenous access is present.  Pulmonary:      Effort: Pulmonary effort is normal.      Breath sounds: Normal air entry. Wheezing present.   Chest:      Chest wall: No tenderness.   Abdominal:      General: Abdomen is flat. Bowel sounds are normal.      Palpations: Abdomen is soft.      Tenderness: There is abdominal tenderness. There is no guarding.      Comments: Taut abdomen. Nonpitting edema.   Musculoskeletal:         General: Tenderness present. Normal range of motion.      Lumbar back: Deformity and tenderness present. No signs of trauma or lacerations.      Right knee: Normal.      Left knee: Normal.      Right lower leg: Tenderness present. Edema present.      Left lower leg: Tenderness present. Edema present.      Right foot: Swelling present. No deformity.      Left foot: Swelling present.      Comments: Lidocaine patch in place.   Feet:      Right foot:      Skin integrity: Warmth present.      Left foot:      Skin integrity: Skin  integrity normal.   Skin:     General: Skin is warm.      Capillary Refill: Capillary refill takes 2 to 3 seconds.      Coloration: Skin is not cyanotic, jaundiced or mottled.   Neurological:      Mental Status: He is alert and oriented to person, place, and time.      GCS: GCS eye subscore is 4. GCS verbal subscore is 5. GCS motor subscore is 6.   Psychiatric:         Mood and Affect: Mood normal.         Behavior: Behavior is cooperative.          Significant Labs: All pertinent labs within the past 24 hours have been reviewed.    Significant Imaging: I have reviewed all pertinent imaging results/findings within the past 24 hours.    CXR 6.8.23 My read:  Portable chest x-ray.  Patient appears rotated slightly, affecting appearance of airway.  No bony deformities or fractures appreciated.  Cardiac silhouette appears to be normal but partially obscured.  Bilateral consolidations appear worsening, more condensed and previous imaging.    Assessment/Plan:   Methamphetamine/fentanyl overdose  Acute renal failure secondary to rhabdo secondary to above- Improving  -Continues to improve. CK downtrending. Nephrology following.     Pneumonia-community-acquired with possible component of aspiration.  - Continue Abx. Stable on vapotherm with saturations >95%.  -continue vanc/Zosyn/doxy with renal dosing day 6. Vanc 500mg pulse dose.  -Repeat CXR 6.8.23. See my read above.    Leukocytosis-Worsening  - Receiving prednisone po to treat inflammation. Remains afebrile.    5.   NICMO  -Echo 50%EF. Positive for intracardiac shunt. Cardiology following. Possible EUGENIA once hemodynamically stable.     6.   Severe fluid overload- Improving  - Nephrology following. Will continue HD as recommended. Appreciate assistance.    7.     Hyperkalemia- Resolved with dialysis  - Daily monitoring for now. Replace electrolytes as needed.    8.    Sepsis secondary to above- Resolved    9.     NSTEMI type 2, however given high peak concern for type 1  -  Discontinued heparin gtt    10.   Shock- Resolved  - Hypertensive with MAP >100. Stable.     11.   Seizure disorder  - Continue Keppra 500mg po. Keppra level pending. No seizure activity to date.    11.   History of polysubstance use disorder  12.   History of anabolic steroid abuse  - Case management consulted for rehab/substance abuse assistance.        CODE STATUS: FULL   Access: HD line, art line, PIV  Antibiotics: PO Doxycycline 100mg BID, Vanc 500mg (pulse dose) and Zosyn  Diet: Regular  DVT Prophylaxis: None  Fluids: None.    GI Prophylaxis: Compazine PRN.  Pain: IV Dilaudid 1mg q3hPRN; PO Norco 10 q6hPRN  Sedation: None        Disposition: IV antibiotics and pain medication as needed. Deescalate both as appropriate. Continue Oxygen status monitoring. Wean supplemental O2 as tolerated. HD per nephrology recs.     Dillon Dominguez MD  Department of Hospital Medicine

## 2023-06-09 NOTE — PROGRESS NOTES
Pharmacokinetic Assessment Follow Up: IV Vancomycin    Vancomycin serum concentration assessment(s):    The random level was drawn correctly and can be used to guide therapy at this time. The measurement is below the desired definitive target range of 15 to 20 mcg/mL.    Vancomycin Regimen Plan:    Will give 500 mg x 1 dose today. Plan for a random level with morning labs on 6/10. Continue to pulse dose.    Drug levels (last 3 results):  Recent Labs   Lab Result Units 06/07/23 0404 06/08/23 0414 06/09/23  0315   Vanc Lvl Random ug/ml 14.0* 17.5 11.2*       Pharmacy will continue to follow and monitor vancomycin.    Please contact pharmacy at extension [phone] for questions regarding this assessment.    Thank you for the consult,   Indio Desai       Patient brief summary:  Ryan Wild is a 32 y.o. male initiated on antimicrobial therapy with IV Vancomycin for treatment of sepsis    Drug Allergies:   Review of patient's allergies indicates:  No Known Allergies    Actual Body Weight:   69.4    Renal Function:   Estimated Creatinine Clearance: 22.1 mL/min (A) (based on SCr of 4.44 mg/dL (H)).,     Dialysis Method (if applicable):  Nephrology assessing need for dialysis on a daily basis. No dialysis today (per nephrology's note).    CBC (last 72 hours):  Recent Labs   Lab Result Units 06/07/23 0404 06/08/23 0440 06/09/23  0315   WBC x10(3)/mcL 20.47* 20.58* 22.61*   Hgb g/dL 11.5* 11.1* 11.5*   Hct % 33.8* 33.4* 35.1*   Platelet x10(3)/mcL 245 244 249   Mono % % 11.6 14.0 8.2   Eos % % 0.5 0.1 0.0   Basophil % % 0.3 0.2 0.1       Metabolic Panel (last 72 hours):  Recent Labs   Lab Result Units 06/07/23 0404 06/08/23 0414 06/09/23  0315   Sodium Level mmol/L 138 138 138   Potassium Level mmol/L 3.6 3.4* 4.2   Chloride mmol/L 106 106 106   Carbon Dioxide mmol/L 21* 22 23   Glucose Level mg/dL 81 78 121*   Blood Urea Nitrogen mg/dL 40.0* 29.3* 36.0*   Creatinine mg/dL 4.79* 4.03* 4.44*   Albumin Level g/dL 1.7* 1.6*  1.7*   Bilirubin Total mg/dL 0.4 0.6 0.5   Alkaline Phosphatase unit/L 41 40 46   Aspartate Aminotransferase unit/L 320* 195* 129*   Alanine Aminotransferase unit/L 257* 202* 184*   Magnesium Level mg/dL 1.70 1.80 2.00   Phosphorus Level mg/dL 2.7 2.7 3.5       Vancomycin Administrations:  vancomycin given in the last 96 hours                     vancomycin 750 mg in sodium chloride 0.9% 250 mL IVPB (mg) 750 mg New Bag 06/07/23 1628    vancomycin (VANCOCIN) 500 mg in sodium chloride 0.9% 100 mL IVPB (MB+) (mg) 500 mg New Bag 06/06/23 1248                    Microbiologic Results:  Microbiology Results (last 7 days)       Procedure Component Value Units Date/Time    Blood culture x two cultures. Draw prior to antibiotics. [[id number]]  (Normal) Collected: 06/02/23 1419    Order Status: Completed Specimen: Blood from Arm, Left Updated: 06/07/23 1900     CULTURE, BLOOD ([facility]) No Growth at 5 days    Blood culture x two cultures. Draw prior to antibiotics. [[id number]]  (Normal) Collected: 06/02/23 1436    Order Status: Completed Specimen: Blood from Hand, Left Updated: 06/07/23 1900     CULTURE, BLOOD ([facility]) No Growth at 5 days    Urine culture [[id number]] Collected: 06/02/23 1900    Order Status: Completed Specimen: Urine, Catheterized Updated: 06/05/23 0957     Urine Culture No Growth

## 2023-06-09 NOTE — PROGRESS NOTES
Inpatient Nutrition Assessment    Admit Date: 6/2/2023   Total duration of encounter: 7 days     Nutrition Recommendation/Prescription     Continue regular diet (encourage oral intake--if long term renal failure ---then change renal diet)  Continue boost plus tid for added nutrition; Boost Plus (provides 360 kcal, 14 g protein per serving)   3.MVI/fe    4.Daily wt  Will monitor nutrition status     Communication of Recommendations: reviewed with nurse    Nutrition Assessment     Malnutrition Assessment/Nutrition-Focused Physical Exam    Malnutrition Context: acute illness or injury  Malnutrition Level: other (see comments) (pt not able to give full diet/wt hx; not meeting malnutrition criteria)  Energy Intake (Malnutrition): less than or equal to 50% for greater than or equal to 5 days               A minimum of two characteristics is recommended for diagnosis of either severe or non-severe malnutrition.    Chart Review    Reason Seen: continuous nutrition monitoring    Malnutrition Screening Tool Results   Have you recently lost weight without trying?: No  Have you been eating poorly because of a decreased appetite?: No   MST Score: 0     Diagnosis:  Meth/fentanyl OD, metabolic encephalopathy, ARF/ATN/rhabdo--on HD; hx polysubstance abuse, shock, PNA, fluid overload, NSTEMI     Relevant Medical History: seizure--drug use, Hep C, solitary kidney     Nutrition-Related Medications: IVF 0.9% NaCl @ 100 ml/hr ,  doxycycline, zosyn vancomycin   Calorie Containing IV Medications: no significant kcals from medications at this time    Nutrition-Related Labs:  (6-5) H/H 11.5/35.8(L) Gluc 75 bun 46.7(H) Cr 4.7(H) GFR 16(L) K 4.7 Alb 2.4(L) (H) (H)   (6-9) H/H 11.5/35.1(L) Gluc 121 Bun 36 Cr 4.4 GFR 17(L) K 4.2 Alb 1.7(L)  (H)     Diet/PN Order: Diet Adult Regular  Oral Supplement Order: Boost Plus  Tube Feeding Order: none  Appetite/Oral Intake: fair/50-75% of meals  Factors Affecting Nutritional  "Intake: decreased appetite, respiratory status, and shortness of breath  Food/Buddhist/Cultural Preferences: none reported  Food Allergies: none reported       Wound(s):   none    Comments  () Pt sitting in bed/ vapotherm; feeling better; reported able to eat some of food /drink boost supplement; no N/V; Nephrology monitor renal status/ recovery--no HD past 2 days; Bun/Cr/GFR--still abnormal. No new wt. Current diet tx appropriate. Encouraged oral intake.     () Pt receiving HD: has bipap; per RN--pt has been agitated; unable to remove mask to eat at this time; unable to obtain diet/wt hx; per EMR--current wt elevated/ ? Fluid; both diet/TF recs provided to meet nutrient needs.     Anthropometrics    Height: 5' 7" (170.2 cm) Height Method: Stated  Last Weight: 69.4 kg (153 lb) (23 1100) Weight Method: Standard Scale  BMI (Calculated): 24  BMI Classification: normal (BMI 18.5-24.9)        Ideal Body Weight (IBW), Male: 148 lb     % Ideal Body Weight, Male (lb): 103.38 %                 Usual Body Weight (UBW), kg:  (pt unable to provide wt hx; EMR wt (2-13) 61.7kg)        Usual Weight Provided By: EMR weight history    Wt Readings from Last 5 Encounters:   23 69.4 kg (153 lb)   23 61.7 kg (136 lb)   10/10/22 63.3 kg (139 lb 8 oz)   22 64.3 kg (141 lb 12.8 oz)   05/10/22 64.4 kg (141 lb 15.6 oz)     Weight Change(s) Since Admission:  Admit Weight: 69.4 kg (153 lb) (23 1310)  No hx     Estimated Needs    Weight Used For Calorie Calculations: 69.4 kg (153 lb)  Energy Calorie Requirements (kcal): 2082 kcal/d; 30 vanita/kg  Energy Need Method: Kcal/kg  Weight Used For Protein Calculations: 69.4 kg (153 lb)  Protein Requirements: 83 gm protein/d; 1.2 gm/kg ; on HD for ARF  Fluid Requirements (mL): 2082 ml/d; 1ml/vanita  Temp (24hrs), Av.7 °F (37.1 °C), Min:98.3 °F (36.8 °C), Max:99.2 °F (37.3 °C)       Enteral Nutrition    Patient not receiving enteral nutrition at this " time.    Parenteral Nutrition    Patient not receiving parenteral nutrition support at this time.    Evaluation of Received Nutrient Intake    Calories: not meeting estimated needs  Protein: not meeting estimated needs    Patient Education    Not applicable.    Nutrition Diagnosis     PES: Inadequate oral intake related to acute illness as evidenced by SOB/on bipap/ eating < 25% . (improving)    Interventions/Goals     Intervention(s): general/healthful diet, commercial beverage, multivitamin/mineral supplement therapy, and collaboration with other providers  Goal: Meet greater than 75% of nutritional needs by follow-up. (goal progressing)    Monitoring & Evaluation     Dietitian will monitor food and beverage intake, weight, and electrolyte/renal panel.  Nutrition Risk/Follow-Up: moderate (follow-up in 3-5 days)   Please consult if re-assessment needed sooner.

## 2023-06-09 NOTE — NURSING
06/09/23 1547   Post-Hemodialysis Assessment   Rinseback Volume (mL) 500 mL   Blood Volume Processed (Liters) 62.7 L   Dialyzer Clearance Moderately streaked   Duration of Treatment 180 minutes   Total UF (mL) 2500 mL   Net Fluid Removal 2000   Patient Response to Treatment Tolerated well   Post-Hemodialysis Comments Tx completed. pt reinfused. CVC deaccessed per P&P, flushed and locked with NS annd new Clear Guard caps applied. Pt ran for 3 hours, NET removed= 2000ml

## 2023-06-09 NOTE — PROGRESS NOTES
[facility]  Nephrology Progress Note  Patient Name: Ryan Wild  Age: 32 y.o.  : 1990  MRN: [id number]  Admission Date: 2023    Chief complaint: Back Pain, Hypotension, and Hearing Problem (PT REPORTS WAKING UP W LOWER BACK PAIN, WEAKNESS, SOB AND HEARING LOSS. 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  DENIES DRUG USE. HX OF SEIZURES, NON COMPLIANT W MEDS > 1 MONTH.  .  EKG OBTAINED. )      Hospital course  Ryan Wild is a 32 y.o. White male with past medical history of seizures, polysubstance abuse, treated hepatitis-C, and solitary kidney.  Patient presented to the emergency department on 2023 with complaints of back pain, shortness breath, and decreased urinary frequency.  Patient believes that symptoms were precipitated by the fact that he has been working outside in the heat for 12 hours. He admitted to polysubstance abuse, including intravenous drug use, and anabolic steroid use.  UDS was positive for methamphetamines and fentanyl. In the emergency department, patient was tachycardic, hypotensive, and hypoxemic.  Laboratory results were remarkable for severe leukocytosis (WBC 41), azotemia, metabolic acidosis, hyperkalemia (serum potassium 6.9), transaminitis, hyperphosphatemia, elevated troponin, and elevated lactic acid level.  CK was 37,420 units per L.  Patient was admitted to the intensive care unit, placed on BiPAP, and emergently dialyzed for correction of life-threatening hyperkalemia on 2023.  Nephrology is continuing to follow for assistance with management of acute kidney injury and multiple electrolyte/acid-base abnormalities    Subjective  Patient is resting in bed, on 100% FiO2 via Vapotherm.  Denies complaints.    Review of Systems  Respiratory:  Negative for shortness of breath.    Cardiovascular:  Negative for chest pain or edema.    Objective  BP (!) 149/86   Pulse 93    "Temp 98.9 °F (37.2 °C) (Oral)   Resp 18   Ht 5' 7" (1.702 m)   Wt 69.4 kg (153 lb)   SpO2 100%   BMI 23.96 kg/m²     Intake/Output Summary (Last 24 hours) at 6/9/2023 0709  Last data filed at 6/9/2023 0407  Gross per 24 hour   Intake --   Output 860 ml   Net -860 ml         Physical Exam  General appearance: Patient is in no acute distress. On NIPPV  HEENT: PERRLA, EOMI, no JVD. Neck is supple.  Right IJ dialysis catheter  Chest:  Mildly tachypneic.  Lung sounds are diminished to auscultation.    Heart: S1, S2.   Abdomen: Benign.  :  Umaña catheter to gravity with dark yellow urine in the collection chamber.  Extremities: No edema, peripheral pulses are palpable.   Neuro: No focal deficits.     Medications    Current Facility-Administered Medications:     0.9%  NaCl infusion, , Intravenous, Continuous, La Figueroa, FNP, Last Rate: 100 mL/hr at 06/09/23 0132, New Bag at 06/09/23 0132    calcium gluconate 1 g in NS IVPB (premixed), 1 g, Intravenous, PRN, Lm Yanes MD, Stopped at 06/04/23 0346    calcium gluconate 1 g in NS IVPB (premixed), 2 g, Intravenous, PRN, Lm Yanes MD    calcium gluconate 1 g in NS IVPB (premixed), 3 g, Intravenous, PRN, Lm Yanes MD    dextrose 10% bolus 125 mL 125 mL, 12.5 g, Intravenous, PRN, Alda Valladares MD, Stopped at 06/02/23 2234    dextrose 40 % gel 15,000 mg, 15 g, Oral, PRNAlda MD    dextrose 40 % gel 30,000 mg, 30 g, Oral, PRNAlda MD    doxycycline tablet 100 mg, 100 mg, Oral, Q12H, Dillon Dominguez MD, 100 mg at 06/08/23 2003    glucagon (human recombinant) injection 1 mg, 1 mg, Intramuscular, PRN, Alda Valladares MD    HYDROmorphone injection 1 mg, 1 mg, Intravenous, Q3H PRN, Dominic Armijo MD, 1 mg at 06/09/23 0516    insulin regular injection 6.94 Units 0.0694 mL, 0.1 Units/kg, Intravenous, PRN, Dominic Armijo MD, 6.94 Units at 06/02/23 2112    levETIRAcetam tablet 500 mg, 500 mg, Oral, BID, Dillon Dominguez MD, 500 mg at 06/08/23 " 2016    LIDOcaine 5 % patch 1 patch, 1 patch, Transdermal, Q24H, Dillon Dominguez MD, 1 patch at 06/08/23 1547    melatonin tablet 9 mg, 9 mg, Oral, Nightly PRN, Adolfo Turcios DO, 9 mg at 06/08/23 2016    naloxone 0.4 mg/mL injection 0.02 mg, 0.02 mg, Intravenous, PRN, Alda Valladares MD    piperacillin-tazobactam (ZOSYN) 4.5 g in dextrose 5 % in water (D5W) 5 % 100 mL IVPB (MB+), 4.5 g, Intravenous, Q12H, Dominic Armijo MD, Last Rate: 25 mL/hr at 06/09/23 0516, 4.5 g at 06/09/23 0516    predniSONE tablet 50 mg, 50 mg, Oral, Daily, Dillon Dominguez MD, 50 mg at 06/08/23 1556    prochlorperazine tablet 10 mg, 10 mg, Oral, TID PRN, Dillon Dominguez MD, 10 mg at 06/08/23 0813    QUEtiapine tablet 100 mg, 100 mg, Oral, Daily, Dillon Dominguez MD    sodium chloride 0.9% flush 10 mL, 10 mL, Intravenous, Q12H PRN, Alda Valladares MD    tamsulosin 24 hr capsule 0.4 mg, 0.4 mg, Oral, Daily, Dillon Dominguez MD    Pharmacy to dose Vancomycin consult, , , Once **AND** vancomycin - pharmacy to dose, , Intravenous, pharmacy to manage frequency, Alda Valladares MD     Imaging:    Reviewed    Laboratory Data:  Hematology  Lab Results   Component Value Date    WBC 22.61 (H) 06/09/2023    HGB 11.5 (L) 06/09/2023    HCT 35.1 (L) 06/09/2023     06/09/2023     06/09/2023    K 4.2 06/09/2023    CHLORIDE 106 06/09/2023    CO2 23 06/09/2023    BUN 36.0 (H) 06/09/2023    CREATININE 4.44 (H) 06/09/2023    EGFRNORACEVR 17 06/09/2023    GLUCOSE 121 (H) 06/09/2023    CALCIUM 8.2 (L) 06/09/2023    ALKPHOS 46 06/09/2023    LABPROT 5.0 (L) 06/09/2023    ALBUMIN 1.7 (L) 06/09/2023     (H) 06/09/2023     (H) 06/09/2023    MG 2.00 06/09/2023    PHOS 3.5 06/09/2023     Lab Results   Component Value Date    FERRITIN 153.40 09/14/2022    LDH 2,294 (H) 06/02/2023       Lab Results   Component Value Date    HIV Nonreactive 09/14/2022    HEPBSURFAG Nonreactive 06/02/2023    HEPBSAB Nonreactive 09/14/2022    HEPBCAB Nonreactive 09/14/2022          Impression  Nonoliguric acute kidney injury, 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  Continue supportive care and monitoring for functional renal recovery.  Will continue to assess for need of RRT on a daily basis.    La Figueroa NP  [facility]   6/9/2023       Addendum: Patient edematous today. HD for 3 hours. UF 2 liters as tolerated. Discussed with primary team  Christal Forte M.D.  Nephrologist

## 2023-06-09 NOTE — PROGRESS NOTES
Discussed recommendation for IP Substance Abuse tx with pt. Pt indicated is is currently in IOP at [facility], but would like to go to [facility] for tx where he has previously been. Will fax referral/clinicals to [facility] when pt medically stable.

## 2023-06-10 LAB
ALBUMIN SERPL-MCNC: 1.7 G/DL (ref 3.5–5)
ALBUMIN/GLOB SERPL: 0.5 RATIO (ref 1.1–2)
ALP SERPL-CCNC: 30 UNIT/L (ref 40–150)
ALT SERPL-CCNC: 155 UNIT/L (ref 0–55)
APTT PPP: 26.6 SECONDS
AST SERPL-CCNC: 87 UNIT/L (ref 5–34)
BASOPHILS # BLD AUTO: 0.05 X10(3)/MCL
BASOPHILS NFR BLD AUTO: 0.3 %
BILIRUBIN DIRECT+TOT PNL SERPL-MCNC: 0.5 MG/DL
BUN SERPL-MCNC: 30.1 MG/DL (ref 8.9–20.6)
CALCIUM SERPL-MCNC: 8.2 MG/DL (ref 8.4–10.2)
CHLORIDE SERPL-SCNC: 108 MMOL/L (ref 98–107)
CK SERPL-CCNC: 965 U/L (ref 30–200)
CO2 SERPL-SCNC: 21 MMOL/L (ref 22–29)
CREAT SERPL-MCNC: 3.7 MG/DL (ref 0.73–1.18)
EOSINOPHIL # BLD AUTO: 0.63 X10(3)/MCL (ref 0–0.9)
EOSINOPHIL NFR BLD AUTO: 3.6 %
ERYTHROCYTE [DISTWIDTH] IN BLOOD BY AUTOMATED COUNT: 12.6 % (ref 11.5–17)
GFR SERPLBLD CREATININE-BSD FMLA CKD-EPI: 21 MLS/MIN/1.73/M2
GLOBULIN SER-MCNC: 3.4 GM/DL (ref 2.4–3.5)
GLUCOSE SERPL-MCNC: 78 MG/DL (ref 74–100)
HCT VFR BLD AUTO: 33.3 % (ref 42–52)
HGB BLD-MCNC: 10.9 G/DL (ref 14–18)
IMM GRANULOCYTES # BLD AUTO: 0.31 X10(3)/MCL (ref 0–0.04)
IMM GRANULOCYTES NFR BLD AUTO: 1.8 %
LYMPHOCYTES # BLD AUTO: 1.43 X10(3)/MCL (ref 0.6–4.6)
LYMPHOCYTES NFR BLD AUTO: 8.2 %
MAGNESIUM SERPL-MCNC: 1.9 MG/DL (ref 1.6–2.6)
MCH RBC QN AUTO: 29 PG (ref 27–31)
MCHC RBC AUTO-ENTMCNC: 32.7 G/DL (ref 33–36)
MCV RBC AUTO: 88.6 FL (ref 80–94)
MONOCYTES # BLD AUTO: 2.9 X10(3)/MCL (ref 0.1–1.3)
MONOCYTES NFR BLD AUTO: 16.6 %
NEUTROPHILS # BLD AUTO: 12.1 X10(3)/MCL (ref 2.1–9.2)
NEUTROPHILS NFR BLD AUTO: 69.5 %
NRBC BLD AUTO-RTO: 0 %
PHOSPHATE SERPL-MCNC: 3.9 MG/DL (ref 2.3–4.7)
PLATELET # BLD AUTO: 260 X10(3)/MCL (ref 130–400)
PMV BLD AUTO: 9.7 FL (ref 7.4–10.4)
POCT GLUCOSE: 87 MG/DL (ref 70–110)
POTASSIUM SERPL-SCNC: 4 MMOL/L (ref 3.5–5.1)
PROT SERPL-MCNC: 5.1 GM/DL (ref 6.4–8.3)
RBC # BLD AUTO: 3.76 X10(6)/MCL (ref 4.7–6.1)
SODIUM SERPL-SCNC: 140 MMOL/L (ref 136–145)
WBC # SPEC AUTO: 17.42 X10(3)/MCL (ref 4.5–11.5)

## 2023-06-10 PROCEDURE — 94799 UNLISTED PULMONARY SVC/PX: CPT

## 2023-06-10 PROCEDURE — 80053 COMPREHEN METABOLIC PANEL: CPT

## 2023-06-10 PROCEDURE — 21400001 HC TELEMETRY ROOM

## 2023-06-10 PROCEDURE — 85025 COMPLETE CBC W/AUTO DIFF WBC: CPT | Performed by: STUDENT IN AN ORGANIZED HEALTH CARE EDUCATION/TRAINING PROGRAM

## 2023-06-10 PROCEDURE — 25000003 PHARM REV CODE 250: Performed by: FAMILY MEDICINE

## 2023-06-10 PROCEDURE — 94761 N-INVAS EAR/PLS OXIMETRY MLT: CPT

## 2023-06-10 PROCEDURE — 97162 PT EVAL MOD COMPLEX 30 MIN: CPT

## 2023-06-10 PROCEDURE — 80100014 HC HEMODIALYSIS 1:1

## 2023-06-10 PROCEDURE — 84100 ASSAY OF PHOSPHORUS: CPT

## 2023-06-10 PROCEDURE — 63600175 PHARM REV CODE 636 W HCPCS: Performed by: STUDENT IN AN ORGANIZED HEALTH CARE EDUCATION/TRAINING PROGRAM

## 2023-06-10 PROCEDURE — 27100171 HC OXYGEN HIGH FLOW UP TO 24 HOURS

## 2023-06-10 PROCEDURE — 85730 THROMBOPLASTIN TIME PARTIAL: CPT | Performed by: STUDENT IN AN ORGANIZED HEALTH CARE EDUCATION/TRAINING PROGRAM

## 2023-06-10 PROCEDURE — 82550 ASSAY OF CK (CPK): CPT | Performed by: STUDENT IN AN ORGANIZED HEALTH CARE EDUCATION/TRAINING PROGRAM

## 2023-06-10 PROCEDURE — 25000003 PHARM REV CODE 250: Performed by: STUDENT IN AN ORGANIZED HEALTH CARE EDUCATION/TRAINING PROGRAM

## 2023-06-10 PROCEDURE — 63600175 PHARM REV CODE 636 W HCPCS: Performed by: FAMILY MEDICINE

## 2023-06-10 PROCEDURE — 83735 ASSAY OF MAGNESIUM: CPT

## 2023-06-10 RX ORDER — POLYETHYLENE GLYCOL 3350 17 G/17G
17 POWDER, FOR SOLUTION ORAL 2 TIMES DAILY
Status: DISCONTINUED | OUTPATIENT
Start: 2023-06-10 | End: 2023-06-16 | Stop reason: HOSPADM

## 2023-06-10 RX ORDER — BISACODYL 10 MG
10 SUPPOSITORY, RECTAL RECTAL DAILY PRN
Status: DISCONTINUED | OUTPATIENT
Start: 2023-06-10 | End: 2023-06-16 | Stop reason: HOSPADM

## 2023-06-10 RX ADMIN — POLYETHYLENE GLYCOL 3350 17 G: 17 POWDER, FOR SOLUTION ORAL at 08:06

## 2023-06-10 RX ADMIN — HYDROMORPHONE HYDROCHLORIDE 0.5 MG: 1 INJECTION, SOLUTION INTRAMUSCULAR; INTRAVENOUS; SUBCUTANEOUS at 01:06

## 2023-06-10 RX ADMIN — PREDNISONE 50 MG: 50 TABLET ORAL at 08:06

## 2023-06-10 RX ADMIN — LIDOCAINE PATCH 5% 1 PATCH: 700 PATCH TOPICAL at 04:06

## 2023-06-10 RX ADMIN — HYDROMORPHONE HYDROCHLORIDE 0.5 MG: 1 INJECTION, SOLUTION INTRAMUSCULAR; INTRAVENOUS; SUBCUTANEOUS at 10:06

## 2023-06-10 RX ADMIN — PIPERACILLIN AND TAZOBACTAM 4.5 G: 4; .5 INJECTION, POWDER, LYOPHILIZED, FOR SOLUTION INTRAVENOUS; PARENTERAL at 05:06

## 2023-06-10 RX ADMIN — HYDROCODONE BITARTRATE AND ACETAMINOPHEN 1 TABLET: 10; 325 TABLET ORAL at 03:06

## 2023-06-10 RX ADMIN — HYDROMORPHONE HYDROCHLORIDE 0.5 MG: 1 INJECTION, SOLUTION INTRAMUSCULAR; INTRAVENOUS; SUBCUTANEOUS at 05:06

## 2023-06-10 RX ADMIN — QUETIAPINE FUMARATE 100 MG: 100 TABLET, FILM COATED ORAL at 08:06

## 2023-06-10 RX ADMIN — POLYETHYLENE GLYCOL 3350 17 G: 17 POWDER, FOR SOLUTION ORAL at 01:06

## 2023-06-10 RX ADMIN — LEVETIRACETAM 500 MG: 500 TABLET, FILM COATED ORAL at 08:06

## 2023-06-10 RX ADMIN — HYDROMORPHONE HYDROCHLORIDE 0.5 MG: 1 INJECTION, SOLUTION INTRAMUSCULAR; INTRAVENOUS; SUBCUTANEOUS at 07:06

## 2023-06-10 RX ADMIN — HYDROCODONE BITARTRATE AND ACETAMINOPHEN 1 TABLET: 10; 325 TABLET ORAL at 08:06

## 2023-06-10 RX ADMIN — HYDROMORPHONE HYDROCHLORIDE 0.5 MG: 1 INJECTION, SOLUTION INTRAMUSCULAR; INTRAVENOUS; SUBCUTANEOUS at 04:06

## 2023-06-10 RX ADMIN — TAMSULOSIN HYDROCHLORIDE 0.4 MG: 0.4 CAPSULE ORAL at 08:06

## 2023-06-10 NOTE — PROGRESS NOTES
[facility]    Progress Note      Patient Name: Ryan Wild  MRN: [id number]  Admission Date: 6/2/2023  Attending Physician: Celso Busby MD   Primary Care Provider: Adilene Dillon NP    Patient information was obtained from patient and ER records.     Subjective:     Principal Problem:Non-traumatic rhabdomyolysis    Chief Complaint:   Chief Complaint   Patient presents with    Back Pain    Hypotension    Hearing Problem     PT REPORTS WAKING UP W LOWER BACK PAIN, WEAKNESS, SOB AND HEARING LOSS. 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  DENIES DRUG USE. HX OF SEIZURES, NON COMPLIANT W MEDS > 1 MONTH.  .  EKG OBTAINED.         HPI: Ryan Wild is a 32 y.o. male with history of seizures (off Keppra), drug use, hepatitis C (treated), and solitary kidney who presented to the ED on 6/2/2023  with a primary complaint of back pain. Patient is a  who had worked a 12 hour shift out in the sun the day prior. States that he went to bed feeling great, without any complaints. Then he woke up late for work day of admission and admits to not remembering much of what happened afterwards. His coworkers brought him to the ED. Admits to severe back pain that is aggravated by movement, weakness, shortness of breath. Also has not urinated all day. Denies chest pain, palpitations, headache, nausea, vomiting, abdominal pain, fevers. Last IV drug use 1 year ago, but snorted meth 2 days ago. Also uses IM steroids, last injection last night. Has not taken his home Keppra for the past few months; his last seizure was on 8/2022.      In the ED, patient presented hypotensive, tachycardic, SpO2 89% on RA. He was placed on bipap. Vasopressors were started when patient continued to be hypotensive with IVF. Labs significant for WBC 42.1, K 6.9, CO2 14, Cr 3.77, , . CPK 37k. LDH 2,2k. CBG normal. Troponin 1.878,  EKG no ischemic changes. Lactic 8.0. CXR  with increased interstitial markings in the right greater than left lung. Bedside Echo no abnormalities. Pt was given Vanc/Zosyn x1, and 4L bolus NS total. Medicine was called to admit patient for septic shock with multiorgan dysfunction.    Hospital course:  06/03/2023: Patient started on HD emergently, Levophed, Precedex, BiPAP, good response to Lasix  06/04/2023:  Recommend repeat HD today, nephro to see, off Levophed, continue Precedex, BiPAP, consider proning after dialysis, if no dialysis planned we will give repeat dose of Lasix.  6.5.23: No acute events overnight, patient remains on Precedex at max. Concerned about his status and why he is still in-house. Unable to tolerate vapotherm or proning for any appreciable amount of time. Currently being dialyzed.   6.6.23: NAEON. HD yesterday. Removed 0.5-1L ultrafiltrate. Well tolerated.  Able to wean off BIPAP to CPAP. HFNC at supper time and able to tolerate small meal. Back on CPAP at night. Still requiring sedation with Precedex. Currently on Vapotherm at 100% O2.  6.7.23: Desatted to low 79-80s on vapotherm. Initially refused to be placed back CPAP. However, was reasonable to be return to it after speaking with ICU nursing staff. Has been irritable and still reports he is in pain. Received Morphine x1. Still on Precedex 1.4mcg. Appears untouched. Has Net positive fluid balance.   6.8.23: Dialyzed yesterday morning. Removed approx 1.5L. Had an additional 850mL in urinary output. Tolerated Vapotherm. O2 sats remained in <90s.   CK improved prior to dialyzing. He still reports significant back pain requiring scheduled dilaudid. No longer on Precedex.   Has an appetite, but not eating much.     Interval history:  NAEO.  Tolerating HFNC.  Patient continues to report whole-body pain which is worse in his back.  His oxygen requirements have not significantly decreased.  We will have the patient work with PT/OT.  CK continues  to downtrend, does have persistent leukocytosis.  Cultures have grown nothing.  He has received 8 days of pneumonia coverage.      Past Medical History:   Diagnosis Date    Depression     Opioid abuse     Seizures     Solitary kidney, congenital     Unspecified viral hepatitis C without hepatic coma        Past Surgical History:   Procedure Laterality Date    TONSILLECTOMY         Review of patient's allergies indicates:  No Known Allergies    No current facility-administered medications on file prior to encounter.     Current Outpatient Medications on File Prior to Encounter   Medication Sig    albuterol (PROVENTIL/VENTOLIN HFA) 90 mcg/actuation inhaler INHALE 2 PUFFS BY MOUTH EVERY 6 HOURS AS NEEDED FOR SHORTNESS OF BREATH OR WHEEZING    fluticasone propionate (FLONASE) 50 mcg/actuation nasal spray SHAKE LIQUID AND USE 2 SPRAYS IN EACH NOSTRIL DAILY    levETIRAcetam (KEPPRA) 500 MG Tab Take 1 tablet by mouth 2 (two) times daily.    QUEtiapine (SEROQUEL) 100 MG Tab Take 1 tablet (100 mg total) by mouth nightly.    valACYclovir (VALTREX) 1000 MG tablet Take 1,000 mg by mouth 3 (three) times daily.    venlafaxine (EFFEXOR-XR) 37.5 MG 24 hr capsule Take 37.5 mg by mouth every morning.     Family History       Problem Relation (Age of Onset)    Cerebral aneurysm Father          Tobacco Use    Smoking status: Every Day     Types: Vaping with nicotine    Smokeless tobacco: Current   Substance and Sexual Activity    Alcohol use: Not Currently    Drug use: Not Currently     Types: Heroin, Methamphetamines     Comment: 3 years sober    Sexual activity: Yes     Partners: Female     Review of Systems   Constitutional:  Negative for chills and fever.   Gastrointestinal:  Negative for abdominal pain, nausea and vomiting.   Genitourinary:  Negative for difficulty urinating.   Musculoskeletal:  Positive for back pain.   Neurological:  Negative for headaches.   Psychiatric/Behavioral:  Negative for hallucinations. The patient is  nervous/anxious.    Objective:     Vital Signs (Most Recent):  Temp: 98.7 °F (37.1 °C) (06/10/23 0356)  Pulse: 96 (06/10/23 0356)  Resp: (!) 22 (06/10/23 0543)  BP: (!) 155/94 (06/10/23 0356)  SpO2: 97 % (06/10/23 0500) Vital Signs (24h Range):  Temp:  [98.3 °F (36.8 °C)-99.5 °F (37.5 °C)] 98.7 °F (37.1 °C)  Pulse:  [] 96  Resp:  [18-22] 22  SpO2:  [90 %-99 %] 97 %  BP: (150-175)/() 155/94     Weight: 69.4 kg (153 lb)  Body mass index is 23.96 kg/m².    Physical Exam  Constitutional:       General: He is awake. He is not in acute distress.     Appearance: Normal appearance. He is normal weight. He is ill-appearing and diaphoretic.      Comments: Lying in bed on Vapotherm. Sleeping.   Eyes:      Extraocular Movements: Extraocular movements intact.      Conjunctiva/sclera: Conjunctivae normal.   Cardiovascular:      Rate and Rhythm: Normal rate and regular rhythm.      Pulses: Normal pulses.           Dorsalis pedis pulses are 2+ on the right side and 2+ on the left side.      Heart sounds: Normal heart sounds.      Arteriovenous access: Right and left arteriovenous access is present.  Pulmonary:      Effort: Pulmonary effort is normal.      Breath sounds: Normal air entry. Wheezing present.   Chest:      Chest wall: No tenderness.   Abdominal:      General: Abdomen is flat. Bowel sounds are normal.      Palpations: Abdomen is soft.      Tenderness: There is abdominal tenderness. There is no guarding.      Comments: Taut abdomen. Nonpitting edema.   Musculoskeletal:         General: Tenderness present. Normal range of motion.      Lumbar back: Deformity and tenderness present. No signs of trauma or lacerations.      Right knee: Normal.      Left knee: Normal.      Right lower leg: Tenderness present. Edema present.      Left lower leg: Tenderness present. Edema present.      Right foot: Swelling present. No deformity.      Left foot: Swelling present.      Comments: Lidocaine patch in place.   Feet:       Right foot:      Skin integrity: Warmth present.      Left foot:      Skin integrity: Skin integrity normal.   Skin:     General: Skin is warm.      Capillary Refill: Capillary refill takes 2 to 3 seconds.      Coloration: Skin is not cyanotic, jaundiced or mottled.   Neurological:      Mental Status: He is alert and oriented to person, place, and time.      GCS: GCS eye subscore is 4. GCS verbal subscore is 5. GCS motor subscore is 6.   Psychiatric:         Mood and Affect: Mood normal.         Behavior: Behavior is cooperative.          Significant Labs: All pertinent labs within the past 24 hours have been reviewed.    Significant Imaging: I have reviewed all pertinent imaging results/findings within the past 24 hours.    CXR 6.8.23 My read:  Portable chest x-ray.  Patient appears rotated slightly, affecting appearance of airway.  No bony deformities or fractures appreciated.  Cardiac silhouette appears to be normal but partially obscured.  Bilateral consolidations appear worsening, more condensed and previous imaging.    Assessment/Plan:   Methamphetamine/fentanyl overdose  Acute renal failure secondary to rhabdo secondary to above- Improving  Whole-body pain  Low back pain  Complex abdominal fluid collection  Fluid overloaded  Hyperkalemia- Resolved with dialysis  HTN 2/2 above  -Continues to improve. CK downtrending. Nephrology following.   -due to persistent low back pain will obtain CT of chest abdomen pelvis to evaluate for possible osteomyelitis and re-evaluate complex fluid collection that was seen in the abdomen previously.  -planning for repeat HD session today  - PT/OT consulted    Pneumonia-community-acquired with possible component of aspiration.  Leukocytosis  Sepsis secondary to above- Resolved  Shock 2/2 above - Resolved  -completed 8 days of vanc/Zosyn  -chest x-ray appears to be improving  - leukocytosis thought to be 2/2 sepsis and improved with fluids and abx initially  - leukocytosis has  begun improving w/ d3/5 of steroids for severe CAP    Intracardiac shunting  NSTEMI type 2, however given high peak concern for type 1  -Echo 50%EF (normal). Positive for intracardiac shunt. Cardiology following. Possible EUGENIA once hemodynamically stable.   - Discontinued heparin gtt previously as pt remained asymptomatic with normal EF and no RWMA    Seizure disorder  - Continue Keppra 500mg po. Keppra level pending. No seizure activity to date.    History of polysubstance use disorder  History of anabolic steroid abuse  - Case management consulted for rehab/substance abuse assistance.        CODE STATUS: FULL   Access: HD line, art line, PIV  Antibiotics:  Completed 8 days of vanc/Zosyn  Diet: Regular  DVT Prophylaxis: None  Fluids: None.    GI Prophylaxis: Compazine PRN.  Pain: IV Dilaudid 1mg q3hPRN; PO Norco 10 q6hPRN  Sedation: None        Disposition:  Stop IV antibiotics, continue pain medications, Deescalate both as appropriate. Continue Oxygen status monitoring. Wean supplemental O2 as tolerated. HD per nephrology recs.     Vida Armijo MD  Department of Hospital Medicine

## 2023-06-10 NOTE — NURSING
0116 PATIENT ASLEEP APPEARS TO BE COMFORTABLE.  NO COMPLAINTS AT PRESENT VAPORTHERM INTACT AT 75%.  PATIENT WILL OCC REMOVE DURING HIS SLEEP.  WILL CONTINUE TO MONITOR OXYGEN SATURATION.  HARRIETT RN

## 2023-06-10 NOTE — NURSING
06/10/23 1400        Hemodialysis Catheter 06/02/23 2200 right internal jugular   Placement Date/Time: 06/02/23 2200   Present Prior to Hospital Arrival?: No  Hand Hygiene: Performed  Barrier Precautions: Performed  Skin Antisepsis: ChloraPrep  Location: right internal jugular  Insertion attempts (enter comment if more than 2 attem...   Line Necessity Review CRRT/HD   Site Assessment No drainage;No redness;No swelling;No warmth   Dressing Type CHG impregnated dressing/sponge   Dressing Status Dry;Intact   Dressing Intervention Integrity maintained   Date on Dressing 06/07/23   Post-Hemodialysis Assessment   Rinseback Volume (mL) 250 mL   Blood Volume Processed (Liters) 62.4 L   Dialyzer Clearance Moderately streaked   Duration of Treatment 180 minutes   Additional Fluid Intake (mL) 250 mL   Total UF (mL) 2500 mL   Net Fluid Removal 2000   Patient Response to Treatment Tolerated well   Post-Hemodialysis Comments Blood rinsed back per P&P. Lines capped and secured.

## 2023-06-10 NOTE — PROGRESS NOTES
[facility]  Nephrology Progress Note  Patient Name: Ryan Wild  Age: 32 y.o.  : 1990  MRN: [id number]  Admission Date: 2023    Chief complaint: Back Pain, Hypotension, and Hearing Problem (PT REPORTS WAKING UP W LOWER BACK PAIN, WEAKNESS, SOB AND HEARING LOSS. 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  DENIES DRUG USE. HX OF SEIZURES, NON COMPLIANT W MEDS > 1 MONTH.  .  EKG OBTAINED. )      Hospital course  Ryan Wild is a 32 y.o. White male with past medical history of seizures, polysubstance abuse, treated hepatitis-C, and solitary kidney.  Patient presented to the emergency department on 2023 with complaints of back pain, shortness breath, and decreased urinary frequency.  Patient believes that symptoms were precipitated by the fact that he has been working outside in the heat for 12 hours. He admitted to polysubstance abuse, including intravenous drug use, and anabolic steroid use.  UDS was positive for methamphetamines and fentanyl. In the emergency department, patient was tachycardic, hypotensive, and hypoxemic.  Laboratory results were remarkable for severe leukocytosis (WBC 41), azotemia, metabolic acidosis, hyperkalemia (serum potassium 6.9), transaminitis, hyperphosphatemia, elevated troponin, and elevated lactic acid level.  CK was 37,420 units per L.  Patient was admitted to the intensive care unit, placed on BiPAP, and emergently dialyzed for correction of life-threatening hyperkalemia on 2023.  Nephrology is continuing to follow for assistance with management of acute kidney injury and multiple electrolyte/acid-base abnormalities    Subjective  Patient is resting in bed, no acute events overnight.    Review of Systems  Respiratory:  Negative for shortness of breath.    Cardiovascular:  Negative for chest pain or edema.    Objective  BP (!) 155/94   Pulse 96   Temp 98.7 °F (37.1 °C)  "(Oral)   Resp 20   Ht 5' 7" (1.702 m)   Wt 69.4 kg (153 lb)   SpO2 99%   BMI 23.96 kg/m²     Intake/Output Summary (Last 24 hours) at 6/10/2023 0549  Last data filed at 6/10/2023 0506  Gross per 24 hour   Intake 780 ml   Output 3525 ml   Net -2745 ml         Physical Exam  General appearance: Patient is in no acute distress. On NIPPV  HEENT: PERRLA, EOMI, no JVD. Neck is supple.  Right IJ dialysis catheter  Chest:   Lung sounds are diminished to auscultation.    Heart: S1, S2.   Abdomen: Benign.  :  Deferred  Extremities: trace generalized edema, peripheral pulses are palpable.   Neuro: No focal deficits.     Medications    Current Facility-Administered Medications:     calcium gluconate 1 g in NS IVPB (premixed), 1 g, Intravenous, PRN, Lm Yanes MD, Stopped at 06/04/23 0346    calcium gluconate 1 g in NS IVPB (premixed), 2 g, Intravenous, PRN, Lm Yanes MD    calcium gluconate 1 g in NS IVPB (premixed), 3 g, Intravenous, PRN, Lm Yanes MD    dextrose 10% bolus 125 mL 125 mL, 12.5 g, Intravenous, PRN, Alda Valladares MD, Stopped at 06/02/23 2234    dextrose 40 % gel 15,000 mg, 15 g, Oral, PRNAlda MD    dextrose 40 % gel 30,000 mg, 30 g, Oral, PRN, Alda Valladares MD    glucagon (human recombinant) injection 1 mg, 1 mg, Intramuscular, PRN, Alda Valladares MD    HYDROcodone-acetaminophen  mg per tablet 1 tablet, 1 tablet, Oral, Q6H PRN, Dillon Dominguez MD, 1 tablet at 06/09/23 2359    HYDROmorphone injection 0.5 mg, 0.5 mg, Intravenous, Q3H PRN, Dillon Dominguez MD, 0.5 mg at 06/10/23 0147    insulin regular injection 6.94 Units 0.0694 mL, 0.1 Units/kg, Intravenous, PRN, Dominic Armijo MD, 6.94 Units at 06/02/23 2112    levETIRAcetam tablet 500 mg, 500 mg, Oral, BID, Dillon Dominguez MD, 500 mg at 06/09/23 2149    LIDOcaine 5 % patch 1 patch, 1 patch, Transdermal, Q24H, Dillon Dominguez MD, 1 patch at 06/08/23 1547    melatonin tablet 9 mg, 9 mg, Oral, Nightly PRN, Adolfo Turcios DO, 9 mg " at 06/09/23 2356    naloxone 0.4 mg/mL injection 0.02 mg, 0.02 mg, Intravenous, PRN, Alda Valladares MD    piperacillin-tazobactam (ZOSYN) 4.5 g in dextrose 5 % in water (D5W) 5 % 100 mL IVPB (MB+), 4.5 g, Intravenous, Q12H, Dominic Armijo MD, Last Rate: 25 mL/hr at 06/10/23 0542, 4.5 g at 06/10/23 0542    predniSONE tablet 50 mg, 50 mg, Oral, Daily, Dillon Dominguez MD, 50 mg at 06/09/23 0823    prochlorperazine tablet 10 mg, 10 mg, Oral, TID PRN, Dillon Dominguez MD, 10 mg at 06/08/23 0813    QUEtiapine tablet 100 mg, 100 mg, Oral, Daily, Dillon Dominguez MD, 100 mg at 06/09/23 1153    sodium chloride 0.9% flush 10 mL, 10 mL, Intravenous, Q12H PRN, Alda Valladares MD    tamsulosin 24 hr capsule 0.4 mg, 0.4 mg, Oral, Daily, Dillon Dominguez MD, 0.4 mg at 06/09/23 0823     Imaging:    Reviewed    Laboratory Data:  Hematology  Lab Results   Component Value Date    WBC 17.42 (H) 06/10/2023    HGB 10.9 (L) 06/10/2023    HCT 33.3 (L) 06/10/2023     06/10/2023     06/10/2023    K 4.0 06/10/2023    CHLORIDE 108 (H) 06/10/2023    CO2 21 (L) 06/10/2023    BUN 30.1 (H) 06/10/2023    CREATININE 3.70 (H) 06/10/2023    EGFRNORACEVR 21 06/10/2023    GLUCOSE 78 06/10/2023    CALCIUM 8.2 (L) 06/10/2023    ALKPHOS 30 (L) 06/10/2023    LABPROT 5.1 (L) 06/10/2023    ALBUMIN 1.7 (L) 06/10/2023    AST 87 (H) 06/10/2023     (H) 06/10/2023    MG 1.90 06/10/2023    PHOS 3.9 06/10/2023     Lab Results   Component Value Date    FERRITIN 153.40 09/14/2022    LDH 2,294 (H) 06/02/2023       Lab Results   Component Value Date    HIV Nonreactive 09/14/2022    HEPBSURFAG Nonreactive 06/02/2023    HEPBSAB Nonreactive 09/14/2022    HEPBCAB Nonreactive 09/14/2022         Impression  Nonoliguric acute kidney injury, 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 and/or hypoperfusion  Solitary kidney  Rhabdomyolysis   Transaminitis, improving  Multifocal pneumonia   Respiratory failure  NSTEMI  History of seizure disorder   Polysubstance  abuse  Treated hepatitis-C    Plan  Patient was dialyzed yesterday, tolerated 2 liter of net UF without adverse events. Will continue supportive care and monitoring for functional renal recovery.    La Figueroa NP  [facility]   6/10/2023     Addendum:  Patient still with oxygen requirements. Will dialyze today to attempt to approach euvolemia as tolerated.  Christal Forte M.D.  Nephrology

## 2023-06-10 NOTE — PLAN OF CARE
Problem: Physical Therapy  Goal: Physical Therapy Goal  Description: Goals to be met by: Discharge     Patient will increase functional independence with mobility by performin. Supine to sit and sit to supine with [place]  2. Sit to stand transfer with [place]  3. Gait  x 500 feet with [place] using No Assistive Device.     Outcome: Ongoing, Progressing

## 2023-06-10 NOTE — PT/OT/SLP EVAL
Physical Therapy Evaluation    Patient Name:  Ryan Wild   MRN:  [id number]    Recommendations:     Discharge Recommendations:  home, outpatient PT   Discharge Equipment Recommendations: none   Barriers to discharge: severity of deficits, decreased endurance, time since onset, and medical diagnosis    Assessment:     Ryan Wild is a 32 y.o. male admitted with a medical diagnosis of Non-traumatic rhabdomyolysis, meth/fentanyl overdose, KINGSLEY, multifocal pneumonia, respiratory failure, NSTEMI, hx of seizure disorder  He presents with the following impairments/functional limitations:  weakness, impaired endurance, gait instability, decreased lower extremity function, pain.    Patient Active Problem List   Diagnosis    Hepatitis C virus infection without hepatic coma    Other bipolar disorder    Opioid use disorder, moderate, in sustained remission    Amphetamine use disorder, severe, in sustained remission    KINGSLEY (acute kidney injury)    NSTEMI (non-ST elevated myocardial infarction)    Non-traumatic rhabdomyolysis    Shock    Solitary kidney, congenital    Hyperkalemia    Hypermagnesemia    Elevated levels of transaminase & lactic acid dehydrogenase    Seizure disorder         Rehab Prognosis: Good.    Patient would benefit from continued skilled acute PT services to: address above listed impairments/functional limitations; receive patient/caregiver education; reduce fall risk; and maximize independency/safety with functional mobility.    Recent Surgery: * No surgery found *      Plan:     During this hospitalization, patient to be seen 3 x/week to address the identified impairments/functional limitations via gait training, therapeutic activities, neuromuscular re-education, therapeutic exercises and progress toward the established goals.    Plan of Care Expires:  07/08/23    Subjective     Communicated with patient's nurse  prior to session.    Patient agreeable to participate in evaluation.     Chief Complaint:  Back pain  Patient/Family Comments/goals: to return to PLOF  Pain/Comfort:  Pain Rating 1: 10/10  Location 1: back  Pain Addressed 1: Nurse notified  Pain Rating Post-Intervention 1: 10/10    Patients cultural, spiritual, Gnosticism conflicts given the current situation: yes    Social History  Living Environment: Patient in a single level home, with no steps, with   Functional Level: Prior to admission patient was employed, was driving, ambulated without assistive device, was independent in ADL's, and was independent in IADL's.  Equipment at Home :none  Assistance Upon Discharge: unknown.    Objective:     Patient found with HOB elevated with telemetry, oxygen  upon PT entry to room.    General Precautions: Standard, fall   Orthopedic Precautions:    Braces:    Respiratory Status: 15 liters/min O2 via high flow nasal cannula @ 30%    Vitals   At Rest (pre-session)  BP  159/84   HR  101   O2 Sat %  98      With Activity (post-session)  BP  NT/NT   HR  120   O2 Sat %  95    Increase in pain with mobility.  Exams:  Orientation: Patient is oriented to Person, Place, Time, Situation  Commands: Patient follows commands consistently  Gross Motor Coordination:  WFL  BILAT UE ROM/strength - defer to OT - see OT note for details  RLE ROM: Deficits: knee and ankle hypomobility 2/2 edema and joint stiffness in BLE   RLE Strength: Deficits: 3-/5 quad, 3+/5 DF, 3-5 hip flexion  LLE ROM: Deficits: knee and ankle hypomobility 2/2 edema and joint stiffness in BLE   LLE Strength: Deficits 3-/5 quad, 3+/5 DF, 3-5 hip flexion    Functional Mobility:    Bed Mobility:  Rolling Right: moderate assistance  Supine to Sit: moderate assistance  Sit to Supine: moderate assistance    Transfers:  Sit to Stand: minimum assistance with no assistive device    Gait:  Patient ambulated 3 ft EOB side steps 2/2 to back pain with no assistive device and minimum assistance.  Patient demonstrates wide base of support and decreased weight shift.    Other  Mobility:  not assessed    Balance:  Sit  Static: NORMAL: No deviations seen in posture held statically  Dynamic: FAIR+: Maintains balance through MINIMAL excursions of active trunk motion  Stand  Static: POOR+: Needs MINIMAL assist to maintain  Dynamic: POOR+: Needs MIN (minimal ) assist during gait    Patient left with HOB elevated with all lines intact and RN present.    Education     Patient was instructed to utilize staff assistance for mobility/transfers.  White board updated regarding patient's safest level of mobility with staff assistance.    Goals     Multidisciplinary Problems       Physical Therapy Goals          Problem: Physical Therapy    Goal Priority Disciplines Outcome Goal Variances Interventions   Physical Therapy Goal     PT, PT/OT Ongoing, Progressing     Description: Goals to be met by: Discharge     Patient will increase functional independence with mobility by performin. Supine to sit and sit to supine with [place]  2. Sit to stand transfer with [place]  3. Gait  x 500 feet with [place] using No Assistive Device.                        History:     Past Medical History:   Diagnosis Date    Depression     Opioid abuse     Seizures     Solitary kidney, congenital     Unspecified viral hepatitis C without hepatic coma      Past Surgical History:   Procedure Laterality Date    TONSILLECTOMY       Time Tracking:     PT Received On: 06/10/23  PT Start Time: 944     PT Stop Time: 1006  PT Total Time (min): 22 min     Billable Minutes: Evaluation MOD 22 mins     06/10/2023

## 2023-06-11 LAB
ALBUMIN SERPL-MCNC: 1.7 G/DL (ref 3.5–5)
ALBUMIN/GLOB SERPL: 0.5 RATIO (ref 1.1–2)
ALP SERPL-CCNC: 32 UNIT/L (ref 40–150)
ALT SERPL-CCNC: 125 UNIT/L (ref 0–55)
APTT PPP: 28.2 SECONDS
AST SERPL-CCNC: 62 UNIT/L (ref 5–34)
BILIRUBIN DIRECT+TOT PNL SERPL-MCNC: 0.4 MG/DL
BUN SERPL-MCNC: 30.8 MG/DL (ref 8.9–20.6)
CALCIUM SERPL-MCNC: 8.3 MG/DL (ref 8.4–10.2)
CHLORIDE SERPL-SCNC: 104 MMOL/L (ref 98–107)
CO2 SERPL-SCNC: 25 MMOL/L (ref 22–29)
CREAT SERPL-MCNC: 3.81 MG/DL (ref 0.73–1.18)
GFR SERPLBLD CREATININE-BSD FMLA CKD-EPI: 20 MLS/MIN/1.73/M2
GLOBULIN SER-MCNC: 3.3 GM/DL (ref 2.4–3.5)
GLUCOSE SERPL-MCNC: 81 MG/DL (ref 74–100)
MAGNESIUM SERPL-MCNC: 1.9 MG/DL (ref 1.6–2.6)
PHOSPHATE SERPL-MCNC: 4 MG/DL (ref 2.3–4.7)
POTASSIUM SERPL-SCNC: 3.8 MMOL/L (ref 3.5–5.1)
PROT SERPL-MCNC: 5 GM/DL (ref 6.4–8.3)
SODIUM SERPL-SCNC: 140 MMOL/L (ref 136–145)

## 2023-06-11 PROCEDURE — 80053 COMPREHEN METABOLIC PANEL: CPT

## 2023-06-11 PROCEDURE — 27100171 HC OXYGEN HIGH FLOW UP TO 24 HOURS

## 2023-06-11 PROCEDURE — 21400001 HC TELEMETRY ROOM

## 2023-06-11 PROCEDURE — 25000003 PHARM REV CODE 250

## 2023-06-11 PROCEDURE — 25000003 PHARM REV CODE 250: Performed by: FAMILY MEDICINE

## 2023-06-11 PROCEDURE — 84100 ASSAY OF PHOSPHORUS: CPT

## 2023-06-11 PROCEDURE — 83735 ASSAY OF MAGNESIUM: CPT

## 2023-06-11 PROCEDURE — 94799 UNLISTED PULMONARY SVC/PX: CPT

## 2023-06-11 PROCEDURE — 25000003 PHARM REV CODE 250: Performed by: INTERNAL MEDICINE

## 2023-06-11 PROCEDURE — 63600175 PHARM REV CODE 636 W HCPCS: Performed by: STUDENT IN AN ORGANIZED HEALTH CARE EDUCATION/TRAINING PROGRAM

## 2023-06-11 PROCEDURE — C1752 CATH,HEMODIALYSIS,SHORT-TERM: HCPCS

## 2023-06-11 PROCEDURE — 25000003 PHARM REV CODE 250: Performed by: STUDENT IN AN ORGANIZED HEALTH CARE EDUCATION/TRAINING PROGRAM

## 2023-06-11 PROCEDURE — 85730 THROMBOPLASTIN TIME PARTIAL: CPT | Performed by: STUDENT IN AN ORGANIZED HEALTH CARE EDUCATION/TRAINING PROGRAM

## 2023-06-11 PROCEDURE — 94761 N-INVAS EAR/PLS OXIMETRY MLT: CPT

## 2023-06-11 RX ORDER — CALCIUM CARBONATE 200(500)MG
500 TABLET,CHEWABLE ORAL ONCE
Status: COMPLETED | OUTPATIENT
Start: 2023-06-11 | End: 2023-06-11

## 2023-06-11 RX ORDER — CALCIUM CARBONATE 200(500)MG
500 TABLET,CHEWABLE ORAL DAILY PRN
Status: DISCONTINUED | OUTPATIENT
Start: 2023-06-11 | End: 2023-06-16 | Stop reason: HOSPADM

## 2023-06-11 RX ORDER — METHOCARBAMOL 500 MG/1
500 TABLET, FILM COATED ORAL 3 TIMES DAILY PRN
Status: DISCONTINUED | OUTPATIENT
Start: 2023-06-11 | End: 2023-06-14

## 2023-06-11 RX ORDER — HEPARIN SODIUM 5000 [USP'U]/ML
5000 INJECTION, SOLUTION INTRAVENOUS; SUBCUTANEOUS EVERY 12 HOURS
Status: DISCONTINUED | OUTPATIENT
Start: 2023-06-12 | End: 2023-06-13

## 2023-06-11 RX ADMIN — QUETIAPINE FUMARATE 100 MG: 100 TABLET, FILM COATED ORAL at 08:06

## 2023-06-11 RX ADMIN — HYDROMORPHONE HYDROCHLORIDE 0.5 MG: 1 INJECTION, SOLUTION INTRAMUSCULAR; INTRAVENOUS; SUBCUTANEOUS at 08:06

## 2023-06-11 RX ADMIN — HYDROCODONE BITARTRATE AND ACETAMINOPHEN 1 TABLET: 10; 325 TABLET ORAL at 08:06

## 2023-06-11 RX ADMIN — HYDROMORPHONE HYDROCHLORIDE 0.5 MG: 1 INJECTION, SOLUTION INTRAMUSCULAR; INTRAVENOUS; SUBCUTANEOUS at 12:06

## 2023-06-11 RX ADMIN — HYDROMORPHONE HYDROCHLORIDE 0.5 MG: 1 INJECTION, SOLUTION INTRAMUSCULAR; INTRAVENOUS; SUBCUTANEOUS at 03:06

## 2023-06-11 RX ADMIN — POLYETHYLENE GLYCOL 3350 17 G: 17 POWDER, FOR SOLUTION ORAL at 08:06

## 2023-06-11 RX ADMIN — PREDNISONE 50 MG: 50 TABLET ORAL at 08:06

## 2023-06-11 RX ADMIN — HYDROMORPHONE HYDROCHLORIDE 0.5 MG: 1 INJECTION, SOLUTION INTRAMUSCULAR; INTRAVENOUS; SUBCUTANEOUS at 06:06

## 2023-06-11 RX ADMIN — TAMSULOSIN HYDROCHLORIDE 0.4 MG: 0.4 CAPSULE ORAL at 08:06

## 2023-06-11 RX ADMIN — HYDROMORPHONE HYDROCHLORIDE 0.5 MG: 1 INJECTION, SOLUTION INTRAMUSCULAR; INTRAVENOUS; SUBCUTANEOUS at 05:06

## 2023-06-11 RX ADMIN — LEVETIRACETAM 500 MG: 500 TABLET, FILM COATED ORAL at 08:06

## 2023-06-11 RX ADMIN — CALCIUM CARBONATE 500 MG: 500 TABLET, CHEWABLE ORAL at 02:06

## 2023-06-11 RX ADMIN — Medication 9 MG: at 02:06

## 2023-06-11 RX ADMIN — CALCIUM CARBONATE 500 MG: 500 TABLET, CHEWABLE ORAL at 01:06

## 2023-06-11 RX ADMIN — METHOCARBAMOL 500 MG: 500 TABLET ORAL at 02:06

## 2023-06-11 RX ADMIN — HYDROCODONE BITARTRATE AND ACETAMINOPHEN 1 TABLET: 10; 325 TABLET ORAL at 01:06

## 2023-06-11 RX ADMIN — HYDROMORPHONE HYDROCHLORIDE 0.5 MG: 1 INJECTION, SOLUTION INTRAMUSCULAR; INTRAVENOUS; SUBCUTANEOUS at 09:06

## 2023-06-11 RX ADMIN — HYDROMORPHONE HYDROCHLORIDE 0.5 MG: 1 INJECTION, SOLUTION INTRAMUSCULAR; INTRAVENOUS; SUBCUTANEOUS at 02:06

## 2023-06-11 NOTE — PLAN OF CARE
Problem: Infection  Goal: Absence of Infection Signs and Symptoms  Outcome: Ongoing, Not Progressing     Problem: Adult Inpatient Plan of Care  Goal: Plan of Care Review  Outcome: Ongoing, Not Progressing  Goal: Patient-Specific Goal (Individualized)  Outcome: Ongoing, Not Progressing  Goal: Absence of Hospital-Acquired Illness or Injury  Outcome: Ongoing, Not Progressing  Goal: Optimal Comfort and Wellbeing  Outcome: Ongoing, Not Progressing  Goal: Readiness for Transition of Care  Outcome: Ongoing, Not Progressing     Problem: Fluid and Electrolyte Imbalance (Acute Kidney Injury/Impairment)  Goal: Fluid and Electrolyte Balance  Outcome: Ongoing, Not Progressing     Problem: Oral Intake Inadequate (Acute Kidney Injury/Impairment)  Goal: Optimal Nutrition Intake  Outcome: Ongoing, Not Progressing     Problem: Renal Function Impairment (Acute Kidney Injury/Impairment)  Goal: Effective Renal Function  Outcome: Ongoing, Not Progressing     Problem: Device-Related Complication Risk (Hemodialysis)  Goal: Safe, Effective Therapy Delivery  Outcome: Ongoing, Not Progressing     Problem: Hemodynamic Instability (Hemodialysis)  Goal: Effective Tissue Perfusion  Outcome: Ongoing, Not Progressing     Problem: Infection (Hemodialysis)  Goal: Absence of Infection Signs and Symptoms  Outcome: Ongoing, Not Progressing     Problem: Skin Injury Risk Increased  Goal: Skin Health and Integrity  Outcome: Ongoing, Not Progressing

## 2023-06-11 NOTE — PROGRESS NOTES
[facility]  Nephrology Progress Note  Patient Name: Ryan Wild  Age: 32 y.o.  : 1990  MRN: [id number]  Admission Date: 2023    Chief complaint: Back Pain, Hypotension, and Hearing Problem (PT REPORTS WAKING UP W LOWER BACK PAIN, WEAKNESS, SOB AND HEARING LOSS. 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  DENIES DRUG USE. HX OF SEIZURES, NON COMPLIANT W MEDS > 1 MONTH.  .  EKG OBTAINED. )      Hospital course  Ryan Wild is a 32 y.o. White male with past medical history of seizures, polysubstance abuse, treated hepatitis-C, and solitary kidney.  Patient presented to the emergency department on 2023 with complaints of back pain, shortness breath, and decreased urinary frequency.  Patient believes that symptoms were precipitated by the fact that he has been working outside in the heat for 12 hours. He admitted to polysubstance abuse, including intravenous drug use, and anabolic steroid use.  UDS was positive for methamphetamines and fentanyl. In the emergency department, patient was tachycardic, hypotensive, and hypoxemic.  Laboratory results were remarkable for severe leukocytosis (WBC 41), azotemia, metabolic acidosis, hyperkalemia (serum potassium 6.9), transaminitis, hyperphosphatemia, elevated troponin, and elevated lactic acid level.  CK was 37,420 units per L.  Patient was admitted to the intensive care unit, placed on BiPAP, and emergently dialyzed for correction of life-threatening hyperkalemia on 2023.  Nephrology is continuing to follow for assistance with management of acute kidney injury and multiple electrolyte/acid-base abnormalities    Subjective  Patient is resting in bed. On 75% FiO2 via Vapotherm.    Review of Systems  Respiratory:  Negative for shortness of breath.    Cardiovascular:  Negative for chest pain or edema.    Objective  BP (!) 163/64   Pulse 107   Temp 99 °F (37.2 °C)  "(Oral)   Resp 20   Ht 5' 7" (1.702 m)   Wt 69.4 kg (153 lb)   SpO2 98%   BMI 23.96 kg/m²     Intake/Output Summary (Last 24 hours) at 6/11/2023 0933  Last data filed at 6/11/2023 0315  Gross per 24 hour   Intake 250 ml   Output 3325 ml   Net -3075 ml         Physical Exam  General appearance: Patient is in no acute distress. On NIPPV  HEENT: PERRLA, EOMI, no JVD. Neck is supple.  Right IJ dialysis catheter  Chest:   Lung sounds are diminished to auscultation.    Heart: S1, S2.   Abdomen: Benign.  :  Deferred  Extremities: trace generalized edema, peripheral pulses are palpable.   Neuro: No focal deficits.     Medications    Current Facility-Administered Medications:     bisacodyL suppository 10 mg, 10 mg, Rectal, Daily PRN, Dominic Armijo MD    calcium gluconate 1 g in NS IVPB (premixed), 1 g, Intravenous, PRN, Lm Yanes MD, Stopped at 06/04/23 0346    calcium gluconate 1 g in NS IVPB (premixed), 2 g, Intravenous, PRN, Lm Yanes MD    calcium gluconate 1 g in NS IVPB (premixed), 3 g, Intravenous, PRN, Lm Yanes MD    dextrose 10% bolus 125 mL 125 mL, 12.5 g, Intravenous, PRN, Alda Valladares MD, Stopped at 06/02/23 2234    dextrose 40 % gel 15,000 mg, 15 g, Oral, PRN, Alda Valladares MD    dextrose 40 % gel 30,000 mg, 30 g, Oral, PRN, Alda Valladares MD    glucagon (human recombinant) injection 1 mg, 1 mg, Intramuscular, PRN, Alda Valladares MD    [START ON 6/12/2023] heparin (porcine) injection 5,000 Units, 5,000 Units, Subcutaneous, Q12H, Dominic Armijo MD    HYDROcodone-acetaminophen  mg per tablet 1 tablet, 1 tablet, Oral, Q6H PRN, Dillon Dominguez MD, 1 tablet at 06/11/23 0125    HYDROmorphone injection 0.5 mg, 0.5 mg, Intravenous, Q3H PRN, Dillon Dominguez MD, 0.5 mg at 06/11/23 0823    insulin regular injection 6.94 Units 0.0694 mL, 0.1 Units/kg, Intravenous, PRN, Dominic Armijo MD, 6.94 Units at 06/02/23 2112    levETIRAcetam tablet 500 mg, 500 mg, Oral, BID, Dillon Dominguez MD, 500 mg at " 06/11/23 0823    LIDOcaine 5 % patch 1 patch, 1 patch, Transdermal, Q24H, Dillon Dominguez MD, 1 patch at 06/10/23 1651    melatonin tablet 9 mg, 9 mg, Oral, Nightly PRN, Adolfo Turcios DO, 9 mg at 06/11/23 0211    naloxone 0.4 mg/mL injection 0.02 mg, 0.02 mg, Intravenous, PRN, Alda Valladares MD    polyethylene glycol packet 17 g, 17 g, Oral, BID, Dominic Armijo MD, 17 g at 06/11/23 0823    predniSONE tablet 50 mg, 50 mg, Oral, Daily, Dillon Dominguez MD, 50 mg at 06/11/23 0823    prochlorperazine tablet 10 mg, 10 mg, Oral, TID PRN, Dillon Dominguez MD, 10 mg at 06/08/23 0813    QUEtiapine tablet 100 mg, 100 mg, Oral, Daily, Dillon Dominguez MD, 100 mg at 06/11/23 0823    sodium chloride 0.9% flush 10 mL, 10 mL, Intravenous, Q12H PRN, Alda Valladares MD    tamsulosin 24 hr capsule 0.4 mg, 0.4 mg, Oral, Daily, Dillon Dominguez MD, 0.4 mg at 06/11/23 0823     Imaging:    Reviewed    Laboratory Data:  Hematology  Lab Results   Component Value Date    WBC 17.42 (H) 06/10/2023    HGB 10.9 (L) 06/10/2023    HCT 33.3 (L) 06/10/2023     06/10/2023     06/11/2023    K 3.8 06/11/2023    CHLORIDE 104 06/11/2023    CO2 25 06/11/2023    BUN 30.8 (H) 06/11/2023    CREATININE 3.81 (H) 06/11/2023    EGFRNORACEVR 20 06/11/2023    GLUCOSE 81 06/11/2023    CALCIUM 8.3 (L) 06/11/2023    ALKPHOS 32 (L) 06/11/2023    LABPROT 5.0 (L) 06/11/2023    ALBUMIN 1.7 (L) 06/11/2023    AST 62 (H) 06/11/2023     (H) 06/11/2023    MG 1.90 06/11/2023    PHOS 4.0 06/11/2023     Lab Results   Component Value Date    FERRITIN 153.40 09/14/2022    LDH 2,294 (H) 06/02/2023       Lab Results   Component Value Date    HIV Nonreactive 09/14/2022    HEPBSURFAG Nonreactive 06/02/2023    HEPBSAB Nonreactive 09/14/2022    HEPBCAB Nonreactive 09/14/2022         Impression  Nonoliguric acute kidney injury, 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 and/or hypoperfusion  Solitary kidney  Rhabdomyolysis   Transaminitis, improving  Multifocal pneumonia    Respiratory failure  NSTEMI  History of seizure disorder   Polysubstance abuse  Treated hepatitis-C    Plan  Patient's condition is stable, there are no acute indications for hemodialysis today.  Continue supportive care.  Will likely continue hemodialysis per Monday, Wednesday, Friday schedule while hospitalized if there are no signs of functional renal recovery.    La Figueroa NP  [facility]   6/11/2023

## 2023-06-11 NOTE — PROGRESS NOTES
[facility]    Progress Note      Patient Name: Ryan Wild  MRN: [id number]  Admission Date: 6/2/2023  Attending Physician: Naida Egan MD   Primary Care Provider: Adilene Dillon NP    Patient information was obtained from patient and ER records.     Subjective:     Principal Problem:Non-traumatic rhabdomyolysis    Chief Complaint:   Chief Complaint   Patient presents with    Back Pain    Hypotension    Hearing Problem     PT REPORTS WAKING UP W LOWER BACK PAIN, WEAKNESS, SOB AND HEARING LOSS. 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  DENIES DRUG USE. HX OF SEIZURES, NON COMPLIANT W MEDS > 1 MONTH.  .  EKG OBTAINED.         HPI: Ryan Wild is a 32 y.o. male with history of seizures (off Keppra), drug use, hepatitis C (treated), and solitary kidney who presented to the ED on 6/2/2023  with a primary complaint of back pain. Patient is a  who had worked a 12 hour shift out in the sun the day prior. States that he went to bed feeling great, without any complaints. Then he woke up late for work day of admission and admits to not remembering much of what happened afterwards. His coworkers brought him to the ED. Admits to severe back pain that is aggravated by movement, weakness, shortness of breath. Also has not urinated all day. Denies chest pain, palpitations, headache, nausea, vomiting, abdominal pain, fevers. Last IV drug use 1 year ago, but snorted meth 2 days ago. Also uses IM steroids, last injection last night. Has not taken his home Keppra for the past few months; his last seizure was on 8/2022.      In the ED, patient presented hypotensive, tachycardic, SpO2 89% on RA. He was placed on bipap. Vasopressors were started when patient continued to be hypotensive with IVF. Labs significant for WBC 42.1, K 6.9, CO2 14, Cr 3.77, , . CPK 37k. LDH 2,2k. CBG normal. Troponin 1.878,  EKG no ischemic changes. Lactic 8.0. CXR  with increased interstitial markings in the right greater than left lung. Bedside Echo no abnormalities. Pt was given Vanc/Zosyn x1, and 4L bolus NS total. Medicine was called to admit patient for septic shock with multiorgan dysfunction.    Hospital course:  06/03/2023: Patient started on HD emergently, Levophed, Precedex, BiPAP, good response to Lasix  06/04/2023:  Recommend repeat HD today, nephro to see, off Levophed, continue Precedex, BiPAP, consider proning after dialysis, if no dialysis planned we will give repeat dose of Lasix.  6.5.23: No acute events overnight, patient remains on Precedex at max. Concerned about his status and why he is still in-house. Unable to tolerate vapotherm or proning for any appreciable amount of time. Currently being dialyzed.   6.6.23: NAEON. HD yesterday. Removed 0.5-1L ultrafiltrate. Well tolerated.  Able to wean off BIPAP to CPAP. HFNC at supper time and able to tolerate small meal. Back on CPAP at night. Still requiring sedation with Precedex. Currently on Vapotherm at 100% O2.  6.7.23: Desatted to low 79-80s on vapotherm. Initially refused to be placed back CPAP. However, was reasonable to be return to it after speaking with ICU nursing staff. Has been irritable and still reports he is in pain. Received Morphine x1. Still on Precedex 1.4mcg. Appears untouched. Has Net positive fluid balance.   6.8.23: Dialyzed yesterday morning. Removed approx 1.5L. Had an additional 850mL in urinary output. Tolerated Vapotherm. O2 sats remained in <90s.   CK improved prior to dialyzing. He still reports significant back pain requiring scheduled dilaudid. No longer on Precedex.   Has an appetite, but not eating much.   6.9.23: Downgraded to tele. Feels his breathing is better, however is more swollen than normal. Complaints of low back and bilateral limb pain. Requesting Dilaudid every three hours. Appetite improving. Urinating, however states he  doesn't know when happening.  6.10.23: NAEO.  Tolerating HFNC.  Patient continues to report whole-body pain which is worse in his back.  His oxygen requirements have not significantly decreased.  We will have the patient work with PT/OT.  CK continues to downtrend, does have persistent leukocytosis.  Cultures have grown nothing.  He has received 8 days of pneumonia coverage.    Interval history:  NAEON. VSS. Received HD one day ago. >2L UF removed. Still feeling swollen. Oxygen requirements have not decreased significantly. Patient participated, but did not tolerate PT due to low back and leg pain. Reports pain not well controlled with Dilaudid and Norco 10. Appetite improving.      Past Medical History:   Diagnosis Date    Depression     Opioid abuse     Seizures     Solitary kidney, congenital     Unspecified viral hepatitis C without hepatic coma        Past Surgical History:   Procedure Laterality Date    TONSILLECTOMY         Review of patient's allergies indicates:  No Known Allergies    No current facility-administered medications on file prior to encounter.     Current Outpatient Medications on File Prior to Encounter   Medication Sig    albuterol (PROVENTIL/VENTOLIN HFA) 90 mcg/actuation inhaler INHALE 2 PUFFS BY MOUTH EVERY 6 HOURS AS NEEDED FOR SHORTNESS OF BREATH OR WHEEZING    fluticasone propionate (FLONASE) 50 mcg/actuation nasal spray SHAKE LIQUID AND USE 2 SPRAYS IN EACH NOSTRIL DAILY    levETIRAcetam (KEPPRA) 500 MG Tab Take 1 tablet by mouth 2 (two) times daily.    QUEtiapine (SEROQUEL) 100 MG Tab Take 1 tablet (100 mg total) by mouth nightly.    valACYclovir (VALTREX) 1000 MG tablet Take 1,000 mg by mouth 3 (three) times daily.    venlafaxine (EFFEXOR-XR) 37.5 MG 24 hr capsule Take 37.5 mg by mouth every morning.     Family History       Problem Relation (Age of Onset)    Cerebral aneurysm Father          Tobacco Use    Smoking status: Every Day     Types: Vaping with nicotine    Smokeless  tobacco: Current   Substance and Sexual Activity    Alcohol use: Not Currently    Drug use: Not Currently     Types: Heroin, Methamphetamines     Comment: 3 years sober    Sexual activity: Yes     Partners: Female     Review of Systems   Constitutional:  Negative for chills and fever.   Gastrointestinal:  Negative for abdominal pain, nausea and vomiting.   Genitourinary:  Negative for difficulty urinating.   Musculoskeletal:  Positive for back pain.   Neurological:  Negative for headaches.   Psychiatric/Behavioral:  Negative for hallucinations. The patient is nervous/anxious.    Objective:     Vital Signs (Most Recent):  Temp: 98.2 °F (36.8 °C) (06/11/23 0332)  Pulse: 101 (06/11/23 0332)  Resp: 18 (06/11/23 0516)  BP: 136/75 (06/11/23 0332)  SpO2: 95 % (06/11/23 0332) Vital Signs (24h Range):  Temp:  [98.2 °F (36.8 °C)-99 °F (37.2 °C)] 98.2 °F (36.8 °C)  Pulse:  [101-120] 101  Resp:  [18-22] 18  SpO2:  [95 %-98 %] 95 %  BP: (136-159)/(75-86) 136/75     Weight: 69.4 kg (153 lb)  Body mass index is 23.96 kg/m².    Physical Exam  Constitutional:       General: He is awake. He is not in acute distress.     Appearance: Normal appearance. He is normal weight. He is ill-appearing. He is not diaphoretic.      Comments: Lying in bed on Vapotherm. Sleeping.   Eyes:      Extraocular Movements: Extraocular movements intact.      Conjunctiva/sclera: Conjunctivae normal.   Cardiovascular:      Rate and Rhythm: Normal rate and regular rhythm.      Pulses: Normal pulses.           Dorsalis pedis pulses are 2+ on the right side and 2+ on the left side.      Heart sounds: Normal heart sounds.      Arteriovenous access: Right and left arteriovenous access is present.  Pulmonary:      Effort: Pulmonary effort is normal.      Breath sounds: Normal air entry. Wheezing present.   Chest:      Chest wall: No tenderness.   Abdominal:      General: Abdomen is flat. Bowel sounds are normal.      Palpations: Abdomen is soft.      Tenderness:  There is no abdominal tenderness. There is no guarding.      Comments: Taut abdomen. Nonpitting edema.   Genitourinary:     Testes: Normal.   Musculoskeletal:         General: Tenderness present. Normal range of motion.      Lumbar back: Deformity and tenderness present. No signs of trauma or lacerations.      Right knee: Normal.      Left knee: Normal.      Right lower leg: Tenderness present. No edema.      Left lower leg: Tenderness present. No edema.      Right foot: Swelling present. No deformity.      Left foot: Swelling present.      Comments: Lidocaine patch in place.   Feet:      Right foot:      Skin integrity: Warmth present.      Left foot:      Skin integrity: Skin integrity normal.   Skin:     General: Skin is warm.      Capillary Refill: Capillary refill takes 2 to 3 seconds.      Coloration: Skin is not cyanotic, jaundiced or mottled.   Neurological:      Mental Status: He is alert and oriented to person, place, and time.      GCS: GCS eye subscore is 4. GCS verbal subscore is 5. GCS motor subscore is 6.   Psychiatric:         Mood and Affect: Mood normal.         Behavior: Behavior is cooperative.          Significant Labs: All pertinent labs within the past 24 hours have been reviewed.    Significant Imaging: I have reviewed all pertinent imaging results/findings within the past 24 hours.    CXR 6.8.23 My read:  Portable chest x-ray.  Patient appears rotated slightly, affecting appearance of airway.  No bony deformities or fractures appreciated.  Cardiac silhouette appears to be normal but partially obscured.  Bilateral consolidations appear worsening, more condensed and previous imaging.    Assessment/Plan:   Methamphetamine/fentanyl overdose  Acute renal failure secondary to rhabdo secondary to above- Improving  Whole-body pain  Low back pain  Complex abdominal fluid collection  Fluid overloaded  Hyperkalemia- Resolved with dialysis  HTN 2/2 above  -Continues to improve. CK downtrending.  Nephrology following.   -planning for repeat HD session per [facility] schedule while inpatient.  - PT/OT following  - Pain medication as needed.    Pneumonia-community-acquired with possible component of aspiration.  Leukocytosis  Sepsis secondary to above- Resolved  Shock 2/2 above - Resolved  -completed 8 days of abx  -chest x-ray appears to be improving  - leukocytosis thought to be 2/2 sepsis and improved with fluids and abx initially  - leukocytosis has begun improving w/ day 4/5 of steroids for severe CAP    Intracardiac shunting  NSTEMI type 2, however given high peak concern for type 1  -Echo 50%EF (normal). Positive for intracardiac shunt. Cardiology following. Possible EUGENIA once hemodynamically stable.   - Discontinued heparin gtt previously as pt remained asymptomatic with normal EF and no RWMA.     Seizure disorder  - Continue Keppra 500mg po. Keppra level <2.0 (6.2.23). No seizure activity to date.    History of polysubstance use disorder  History of anabolic steroid abuse  - Case management consulted for rehab/substance abuse assistance.        CODE STATUS: FULL   Access: HD line, art line, PIV  Antibiotics:  Completed 8 days of vanc/Zosyn  Diet: Regular  DVT Prophylaxis: Heparin 5K units BID.  Fluids: None.    GI Prophylaxis: Compazine PRN.  Pain: IV Dilaudid 1mg q3hPRN; PO Norco 10 q6hPRN  Sedation: None        Disposition: Continue pain medications, Deescalate as appropriate. Continue Oxygen status monitoring. Wean supplemental O2 as tolerated. HD per nephrology recs.     Dillon Dominguez MD  Department of Hospital Medicine

## 2023-06-12 LAB
A-ADO2 BLOOD GAS (OHS): 379 MMHG
ALBUMIN SERPL-MCNC: 1.8 G/DL (ref 3.5–5)
ALBUMIN/GLOB SERPL: 0.5 RATIO (ref 1.1–2)
ALLENS TEST BLOOD GAS (OHS): YES
ALP SERPL-CCNC: 41 UNIT/L (ref 40–150)
ALT SERPL-CCNC: 117 UNIT/L (ref 0–55)
APPEARANCE UR: CLEAR
AST SERPL-CCNC: 60 UNIT/L (ref 5–34)
BACTERIA #/AREA URNS AUTO: ABNORMAL /HPF
BASE EXCESS BLD CALC-SCNC: 4.5 MMOL/L (ref -2–2)
BASOPHILS # BLD AUTO: 0.03 X10(3)/MCL
BASOPHILS NFR BLD AUTO: 0.1 %
BILIRUB UR QL STRIP.AUTO: NEGATIVE MG/DL
BILIRUBIN DIRECT+TOT PNL SERPL-MCNC: 0.3 MG/DL
BLOOD GAS SAMPLE TYPE (OHS): ABNORMAL
BUN SERPL-MCNC: 41.9 MG/DL (ref 8.9–20.6)
CALCIUM SERPL-MCNC: 8.2 MG/DL (ref 8.4–10.2)
CHLORIDE SERPL-SCNC: 105 MMOL/L (ref 98–107)
CK SERPL-CCNC: 459 U/L (ref 30–200)
CO2 BLDA-SCNC: 30.5 MMOL/L (ref 22–26)
CO2 SERPL-SCNC: 23 MMOL/L (ref 22–29)
COHGB MFR BLDA: 2 % (ref 0.5–1.5)
COLOR UR: ABNORMAL
CREAT SERPL-MCNC: 4.3 MG/DL (ref 0.73–1.18)
DRAWN BY BLOOD GAS (OHS): ABNORMAL
EOSINOPHIL # BLD AUTO: 0.06 X10(3)/MCL (ref 0–0.9)
EOSINOPHIL NFR BLD AUTO: 0.3 %
ERYTHROCYTE [DISTWIDTH] IN BLOOD BY AUTOMATED COUNT: 12.9 % (ref 11.5–17)
FIO2 BLOOD GAS (OHS): 75 %
GFR SERPLBLD CREATININE-BSD FMLA CKD-EPI: 18 MLS/MIN/1.73/M2
GLOBULIN SER-MCNC: 3.5 GM/DL (ref 2.4–3.5)
GLUCOSE SERPL-MCNC: 107 MG/DL (ref 74–100)
GLUCOSE UR QL STRIP.AUTO: NORMAL MG/DL
HCO3 BLDA-SCNC: 29.2 MMOL/L (ref 22–26)
HCT VFR BLD AUTO: 31.8 % (ref 42–52)
HGB BLD-MCNC: 10.6 G/DL (ref 14–18)
HYALINE CASTS #/AREA URNS LPF: ABNORMAL /LPF
IMM GRANULOCYTES # BLD AUTO: 0.29 X10(3)/MCL (ref 0–0.04)
IMM GRANULOCYTES NFR BLD AUTO: 1.4 %
KETONES UR QL STRIP.AUTO: NEGATIVE MG/DL
LEUKOCYTE ESTERASE UR QL STRIP.AUTO: NEGATIVE UNIT/L
LPM (OHS): 13
LYMPHOCYTES # BLD AUTO: 1.45 X10(3)/MCL (ref 0.6–4.6)
LYMPHOCYTES NFR BLD AUTO: 7.2 %
MAGNESIUM SERPL-MCNC: 2 MG/DL (ref 1.6–2.6)
MCH RBC QN AUTO: 29.4 PG (ref 27–31)
MCHC RBC AUTO-ENTMCNC: 33.3 G/DL (ref 33–36)
MCV RBC AUTO: 88.1 FL (ref 80–94)
METHGB MFR BLDA: 1 % (ref 0–1.5)
MONOCYTES # BLD AUTO: 2.21 X10(3)/MCL (ref 0.1–1.3)
MONOCYTES NFR BLD AUTO: 11 %
MUCOUS THREADS URNS QL MICRO: ABNORMAL /LPF
NEUTROPHILS # BLD AUTO: 15.97 X10(3)/MCL (ref 2.1–9.2)
NEUTROPHILS NFR BLD AUTO: 80 %
NITRITE UR QL STRIP.AUTO: NEGATIVE
NRBC BLD AUTO-RTO: 0 %
O2 HB BLOOD GAS (OHS): 96.5 % (ref 94–100)
OXYGEN DEVICE BLOOD GAS (OHS): ABNORMAL
OXYHGB MFR BLDA: 10.2 G/DL (ref 12–18)
PAO2 BLOOD GAS (OHS): 481 MMHG
PCO2 BLDA: 43 MMHG (ref 35–45)
PH BLDA: 7.44 [PH] (ref 7.35–7.45)
PH UR STRIP.AUTO: 5.5 [PH]
PHOSPHATE SERPL-MCNC: 4.7 MG/DL (ref 2.3–4.7)
PLATELET # BLD AUTO: 365 X10(3)/MCL (ref 130–400)
PMV BLD AUTO: 9.7 FL (ref 7.4–10.4)
PO2 BLDA: 102 MMHG (ref 75–100)
POCT GLUCOSE: 117 MG/DL (ref 70–110)
POCT GLUCOSE: 149 MG/DL (ref 70–110)
POTASSIUM SERPL-SCNC: 4.2 MMOL/L (ref 3.5–5.1)
PROT SERPL-MCNC: 5.3 GM/DL (ref 6.4–8.3)
PROT UR QL STRIP.AUTO: ABNORMAL MG/DL
RBC # BLD AUTO: 3.61 X10(6)/MCL (ref 4.7–6.1)
RBC #/AREA URNS AUTO: ABNORMAL /HPF
RBC UR QL AUTO: ABNORMAL UNIT/L
SAMPLE SITE BLOOD GAS (OHS): ABNORMAL
SAO2 % BLDA: 99.5 %
SODIUM SERPL-SCNC: 139 MMOL/L (ref 136–145)
SP GR UR STRIP.AUTO: 1.02
SQUAMOUS #/AREA URNS LPF: ABNORMAL /HPF
UROBILINOGEN UR STRIP-ACNC: NORMAL MG/DL
WBC # SPEC AUTO: 20.01 X10(3)/MCL (ref 4.5–11.5)
WBC #/AREA URNS AUTO: ABNORMAL /HPF

## 2023-06-12 PROCEDURE — 85025 COMPLETE CBC W/AUTO DIFF WBC: CPT | Performed by: STUDENT IN AN ORGANIZED HEALTH CARE EDUCATION/TRAINING PROGRAM

## 2023-06-12 PROCEDURE — 27100171 HC OXYGEN HIGH FLOW UP TO 24 HOURS

## 2023-06-12 PROCEDURE — 99900035 HC TECH TIME PER 15 MIN (STAT)

## 2023-06-12 PROCEDURE — 94761 N-INVAS EAR/PLS OXIMETRY MLT: CPT

## 2023-06-12 PROCEDURE — 80100014 HC HEMODIALYSIS 1:1

## 2023-06-12 PROCEDURE — 25000003 PHARM REV CODE 250: Performed by: INTERNAL MEDICINE

## 2023-06-12 PROCEDURE — 21400001 HC TELEMETRY ROOM

## 2023-06-12 PROCEDURE — 25000003 PHARM REV CODE 250: Performed by: FAMILY MEDICINE

## 2023-06-12 PROCEDURE — 87040 BLOOD CULTURE FOR BACTERIA: CPT | Performed by: STUDENT IN AN ORGANIZED HEALTH CARE EDUCATION/TRAINING PROGRAM

## 2023-06-12 PROCEDURE — 25000003 PHARM REV CODE 250: Performed by: STUDENT IN AN ORGANIZED HEALTH CARE EDUCATION/TRAINING PROGRAM

## 2023-06-12 PROCEDURE — 82550 ASSAY OF CK (CPK): CPT | Performed by: STUDENT IN AN ORGANIZED HEALTH CARE EDUCATION/TRAINING PROGRAM

## 2023-06-12 PROCEDURE — 84100 ASSAY OF PHOSPHORUS: CPT

## 2023-06-12 PROCEDURE — 94799 UNLISTED PULMONARY SVC/PX: CPT

## 2023-06-12 PROCEDURE — 36600 WITHDRAWAL OF ARTERIAL BLOOD: CPT

## 2023-06-12 PROCEDURE — 97530 THERAPEUTIC ACTIVITIES: CPT

## 2023-06-12 PROCEDURE — 97535 SELF CARE MNGMENT TRAINING: CPT

## 2023-06-12 PROCEDURE — 63600175 PHARM REV CODE 636 W HCPCS: Performed by: FAMILY MEDICINE

## 2023-06-12 PROCEDURE — 83735 ASSAY OF MAGNESIUM: CPT

## 2023-06-12 PROCEDURE — 63600175 PHARM REV CODE 636 W HCPCS: Performed by: STUDENT IN AN ORGANIZED HEALTH CARE EDUCATION/TRAINING PROGRAM

## 2023-06-12 PROCEDURE — 80053 COMPREHEN METABOLIC PANEL: CPT

## 2023-06-12 PROCEDURE — 82803 BLOOD GASES ANY COMBINATION: CPT

## 2023-06-12 PROCEDURE — 81001 URINALYSIS AUTO W/SCOPE: CPT | Performed by: STUDENT IN AN ORGANIZED HEALTH CARE EDUCATION/TRAINING PROGRAM

## 2023-06-12 PROCEDURE — 97166 OT EVAL MOD COMPLEX 45 MIN: CPT

## 2023-06-12 RX ORDER — FENTANYL 50 UG/1
1 PATCH TRANSDERMAL
Status: COMPLETED | OUTPATIENT
Start: 2023-06-12 | End: 2023-06-15

## 2023-06-12 RX ORDER — GABAPENTIN 300 MG/1
300 CAPSULE ORAL 3 TIMES DAILY
Status: COMPLETED | OUTPATIENT
Start: 2023-06-12 | End: 2023-06-12

## 2023-06-12 RX ORDER — GABAPENTIN 300 MG/1
300 CAPSULE ORAL 3 TIMES DAILY
Status: DISCONTINUED | OUTPATIENT
Start: 2023-06-12 | End: 2023-06-16 | Stop reason: HOSPADM

## 2023-06-12 RX ADMIN — HEPARIN SODIUM 5000 UNITS: 5000 INJECTION, SOLUTION INTRAVENOUS; SUBCUTANEOUS at 08:06

## 2023-06-12 RX ADMIN — HYDROMORPHONE HYDROCHLORIDE 0.5 MG: 1 INJECTION, SOLUTION INTRAMUSCULAR; INTRAVENOUS; SUBCUTANEOUS at 12:06

## 2023-06-12 RX ADMIN — HEPARIN SODIUM 5000 UNITS: 5000 INJECTION, SOLUTION INTRAVENOUS; SUBCUTANEOUS at 09:06

## 2023-06-12 RX ADMIN — HYDROCODONE BITARTRATE AND ACETAMINOPHEN 1 TABLET: 10; 325 TABLET ORAL at 06:06

## 2023-06-12 RX ADMIN — METHOCARBAMOL 500 MG: 500 TABLET ORAL at 12:06

## 2023-06-12 RX ADMIN — HYDROMORPHONE HYDROCHLORIDE 0.5 MG: 1 INJECTION, SOLUTION INTRAMUSCULAR; INTRAVENOUS; SUBCUTANEOUS at 09:06

## 2023-06-12 RX ADMIN — GABAPENTIN 300 MG: 300 CAPSULE ORAL at 08:06

## 2023-06-12 RX ADMIN — HYDROMORPHONE HYDROCHLORIDE 0.5 MG: 1 INJECTION, SOLUTION INTRAMUSCULAR; INTRAVENOUS; SUBCUTANEOUS at 01:06

## 2023-06-12 RX ADMIN — TAMSULOSIN HYDROCHLORIDE 0.4 MG: 0.4 CAPSULE ORAL at 08:06

## 2023-06-12 RX ADMIN — CALCIUM CARBONATE 500 MG: 500 TABLET, CHEWABLE ORAL at 12:06

## 2023-06-12 RX ADMIN — HYDROMORPHONE HYDROCHLORIDE 0.5 MG: 1 INJECTION, SOLUTION INTRAMUSCULAR; INTRAVENOUS; SUBCUTANEOUS at 08:06

## 2023-06-12 RX ADMIN — GABAPENTIN 300 MG: 300 CAPSULE ORAL at 03:06

## 2023-06-12 RX ADMIN — LEVETIRACETAM 500 MG: 500 TABLET, FILM COATED ORAL at 08:06

## 2023-06-12 RX ADMIN — GABAPENTIN 300 MG: 300 CAPSULE ORAL at 12:06

## 2023-06-12 RX ADMIN — PREDNISONE 50 MG: 50 TABLET ORAL at 08:06

## 2023-06-12 RX ADMIN — HYDROMORPHONE HYDROCHLORIDE 0.5 MG: 1 INJECTION, SOLUTION INTRAMUSCULAR; INTRAVENOUS; SUBCUTANEOUS at 04:06

## 2023-06-12 RX ADMIN — QUETIAPINE FUMARATE 100 MG: 100 TABLET, FILM COATED ORAL at 08:06

## 2023-06-12 RX ADMIN — LEVETIRACETAM 500 MG: 500 TABLET, FILM COATED ORAL at 09:06

## 2023-06-12 RX ADMIN — POLYETHYLENE GLYCOL 3350 17 G: 17 POWDER, FOR SOLUTION ORAL at 08:06

## 2023-06-12 RX ADMIN — METHOCARBAMOL 500 MG: 500 TABLET ORAL at 06:06

## 2023-06-12 RX ADMIN — HYDROCODONE BITARTRATE AND ACETAMINOPHEN 1 TABLET: 10; 325 TABLET ORAL at 12:06

## 2023-06-12 RX ADMIN — FENTANYL 1 PATCH: 50 PATCH TRANSDERMAL at 08:06

## 2023-06-12 RX ADMIN — HYDROMORPHONE HYDROCHLORIDE 0.5 MG: 1 INJECTION, SOLUTION INTRAMUSCULAR; INTRAVENOUS; SUBCUTANEOUS at 11:06

## 2023-06-12 NOTE — PT/OT/SLP PROGRESS
Physical Therapy Treatment    Patient Name:  Ryan Wild   MRN:  [id number]    Recommendations     Discharge Recommendations:  home, outpatient PT vs. LTAC   Discharge Equipment Recommendations: oxygen (TBD - rolling walker vs straight cane)   Barriers to discharge: fall risk, severity of deficits, level of skilled assistance required, and decreased endurance    Assessment     Ryan Wild is a 32 y.o. male admitted with a medical diagnosis of Non-traumatic rhabdomyolysis.    Methamphetamine/fentanyl overdose  Acute renal failure secondary to rhabdo secondary to above- Improving  Whole-body pain  Low back pain  Complex abdominal fluid collection  Fluid overloaded  Hyperkalemia- Resolved with dialysis  HTN 2/2 above  Pneumonia-community-acquired with possible component of aspiration.  Leukocytosis  Sepsis secondary to above- Resolved  Shock 2/2 above - Resolved  Intracardiac shunting  NSTEMI type 2, however given high peak concern for type 1  Seizure disorder  History of polysubstance use disorder  History of anabolic steroid abuse    Patient Active Problem List   Diagnosis    Hepatitis C virus infection without hepatic coma    Other bipolar disorder    Opioid use disorder, moderate, in sustained remission    Amphetamine use disorder, severe, in sustained remission    KINGSLEY (acute kidney injury)    NSTEMI (non-ST elevated myocardial infarction)    Non-traumatic rhabdomyolysis    Shock    Solitary kidney, congenital    Hyperkalemia    Hypermagnesemia    Elevated levels of transaminase & lactic acid dehydrogenase    Seizure disorder     He presents with the following impairments/functional limitations:  weakness, impaired endurance, impaired self care skills, impaired functional mobility, gait instability, impaired balance, decreased lower extremity function, pain, impaired cardiopulmonary response to activity.    Rehab Prognosis: Good.    Patient would benefit from continued skilled acute PT services to: address above  listed impairments/functional limitations; receive patient/caregiver education; reduce fall risk; and maximize independency/safety with functional mobility.    -continued: standing edge of bed, side steps at edge of bed, ambulation, with progression of gait distance/frequency/duration and speed, as tolerated/appropriate, with assistance and supervision    Recent Surgery: Procedure(s) (LRB):  Insertion, Catheter, Central Venous, Hemodialysis (N/A)      Plan     During this hospitalization, patient to be seen 3 x/week to address the identified impairments/functional limitations via gait training, therapeutic activities, therapeutic exercises and progress toward the established goals.    Plan of Care Expires:  07/08/23    Subjective     Communicated with patient's nurse Riana prior to session.    Patient agreeable to participate in treatment session.    Chief Complaint: back pain  Patient/Family Comments/goals: home  Pain/Comfort:  Pain Rating 1: 5/10  Location - Orientation 1: lower  Location 1: back  Pain Addressed 1: Nurse notified  Pain Rating Post-Intervention 1: 10/10    Objective     Patient found supine in bed, with HOB elevated, and bed rails up bilateral HOB and left FOB with telemetry, central line, peripheral IV, oxygen  upon PT entry to room.    General Precautions: Standard, fall, seizure, hearing impaired   Orthopedic Precautions:N/A   Braces: N/A  Respiratory Status: 10 liters/min O2 via high flow nasal cannula @ 62%    Functional Mobility:    Bed Mobility:  Rolling Left: modified independence  Rolling Right: modified independence  Scooting: stand by assistance  Supine to Sit: stand by assistance  Sit to Supine: stand by assistance  with no cues required    Transfers:  Sit to Stand: contact guard assistance with rolling walker  with cues for hand placement, foot placement, and posture    Gait:  Patient ambulated 12ft x3 sessions w/ sitting rest x4 minutes b/w sessions with focused breathing cues with  rolling walker and contact guard assistance.  Patient demonstrates decreased step length, wide base of support, decreased foot/floor clearance, inconsistent bilateral foot placement, and flexed posture.    Other Mobility:  Therapeutic Activities performed:        -sitting balance activities:    *donned 2nd 'back' gown  *bilat  socks adjusted            weight shifting:                   -laterally (left)                 -laterally (right)            scooting:                   -forward                 -backward            repositioning at edge of bed    Balance:  Sit  Patient demonstrated static balance on level surface with independence with no verbal cues.  Patient demonstrated dynamic balance on level surface with modified independence with no verbal cues during moderate excursions.  Stand  Patient demonstrated static balance on level surface  using rolling walker with modified independence with no verbal cues.    Vitals (1st and 2nd gait sessions only)  O2 Sat %  1st gait session  rest  98   after gait  83   recovery  92 after 1 minute      O2 Sat %  2nd gait session  rest  97   after gait  89   recovery  94 after 30 seconds      Patient left supine in bed, with HOB elevated, and bed rails up bilateral HOB and left FOB with telemetry, central line, peripheral IV, oxygen, all lines intact, call button in reach, tray table at bedside, bedside commode at bedside, pt's nurse Riana notified, and visitor present.    Goals     Multidisciplinary Problems       Physical Therapy Goals       Problem: Physical Therapy    Goal Priority Disciplines Outcome Goal Variances Interventions   Physical Therapy Goal     PT, PT/OT Ongoing, Progressing     Description: Goals to be met by: DISCHARGE     Patient will increase functional independence with mobility by performin. Supine to sit and sit to supine with [place] - ONGOING  2. Sit to stand transfer with [place] - ONGOING  3. Gait  x 500 feet with  [place] using No Assistive Device - ONGOING           Time Tracking     PT Received On: 06/12/23  PT Start Time: 1103     PT Stop Time: 1126  PT Total Time (min): 23 min     Billable Minutes: Therapeutic Activity 23 06/12/2023

## 2023-06-12 NOTE — PROGRESS NOTES
Inpatient Nutrition Assessment    Admit Date: 6/2/2023   Total duration of encounter: 10 days     Nutrition Recommendation/Prescription     Change diet to Renal Dialysis  Discontinue Boost oral supplement as appetite / intake improving   3.   Renal MVI  4.   Daily wts      Communication of Recommendations: reviewed with patient and reviewed with family    Nutrition Assessment     Malnutrition Assessment/Nutrition-Focused Physical Exam    Malnutrition Context: acute illness or injury  Malnutrition Level: other (see comments) (pt not able to give full diet/wt hx; not meeting malnutrition criteria)  Energy Intake (Malnutrition): less than or equal to 50% for greater than or equal to 5 days               A minimum of two characteristics is recommended for diagnosis of either severe or non-severe malnutrition.    Chart Review    Reason Seen: continuous nutrition monitoring    Malnutrition Screening Tool Results   Have you recently lost weight without trying?: No  Have you been eating poorly because of a decreased appetite?: No   MST Score: 0     Diagnosis:  Meth/fentanyl OD, metabolic encephalopathy, ARF/ATN/rhabdo--on HD; hx polysubstance abuse, shock, PNA, fluid overload, NSTEMI     Relevant Medical History: seizure--drug use, Hep C, solitary kidney     Nutrition-Related Medications: polyethylene glycol, calcium carbonate PRN,   Calorie Containing IV Medications: no significant kcals from medications at this time    Nutrition-Related Labs:  (6-5) H/H 11.5/35.8(L) Gluc 75 bun 46.7(H) Cr 4.7(H) GFR 16(L) K 4.7 Alb 2.4(L) (H) (H)   (6-9) H/H 11.5/35.1(L) Gluc 121 Bun 36 Cr 4.4 GFR 17(L) K 4.2 Alb 1.7(L)  (H)   6/12:  H/H 10.6 L/31.8 L, BUN 41.9 H, Creat 4.3 H, Glu 107, Alb 1.8 L, AST 60 H,  H    Diet/PN Order: Diet Adult Regular  Oral Supplement Order: Boost Plus  Tube Feeding Order: none  Appetite/Oral Intake: fair/50-75% of meals  Factors Affecting Nutritional Intake: decreased  "appetite, respiratory status, and shortness of breath  Food/Protestant/Cultural Preferences: none reported  Food Allergies: none reported       Wound(s):   none    Comments    6/12:  Pt appetite / intake improving.  Asked by mother at bedside to educate pt on Renal diet - pt to begin dialysis 3 days / week.  Pt scheduled for dialysis cath placement.  Pt not happy with renal restrictions and change to diet but understands the importance.  Labs / meds reviewed - reflective of current dx.    (6/9) Pt sitting in bed/ vapotherm; feeling better; reported able to eat some of food /drink boost supplement; no N/V; Nephrology monitor renal status/ recovery--no HD past 2 days; Bun/Cr/GFR--still abnormal. No new wt. Current diet tx appropriate. Encouraged oral intake.     (6/5) Pt receiving HD: has bipap; per RN--pt has been agitated; unable to remove mask to eat at this time; unable to obtain diet/wt hx; per EMR--current wt elevated/ ? Fluid; both diet/TF recs provided to meet nutrient needs.       Anthropometrics    Height: 5' 7" (170.2 cm) Height Method: Stated  Last Weight: 69.4 kg (153 lb) (06/05/23 1100) Weight Method: Standard Scale  BMI (Calculated): 24  BMI Classification: normal (BMI 18.5-24.9)        Ideal Body Weight (IBW), Male: 148 lb     % Ideal Body Weight, Male (lb): 103.38 %                 Usual Body Weight (UBW), kg:  (pt unable to provide wt hx; EMR wt (2-13) 61.7kg)        Usual Weight Provided By: EMR weight history    Wt Readings from Last 5 Encounters:   06/05/23 69.4 kg (153 lb)   02/13/23 61.7 kg (136 lb)   10/10/22 63.3 kg (139 lb 8 oz)   07/21/22 64.3 kg (141 lb 12.8 oz)   05/10/22 64.4 kg (141 lb 15.6 oz)     Weight Change(s) Since Admission:  Admit Weight: 69.4 kg (153 lb) (06/02/23 1310)  No hx     Estimated Needs    Weight Used For Calorie Calculations: 69.4 kg (153 lb)  Energy Calorie Requirements (kcal): 2082 kcal/d; 30 vanita/kg  Energy Need Method: Kcal/kg  Weight Used For Protein Calculations: " 69.4 kg (153 lb)  Protein Requirements: 83 gm protein/d; 1.2 gm/kg ; on HD for ARF  Fluid Requirements (mL): 2082 ml/d; 1ml/vanita  Temp (24hrs), Av.5 °F (36.9 °C), Min:98 °F (36.7 °C), Max:98.9 °F (37.2 °C)       Enteral Nutrition    Patient not receiving enteral nutrition at this time.    Parenteral Nutrition    Patient not receiving parenteral nutrition support at this time.    Evaluation of Received Nutrient Intake    Calories: not meeting estimated needs  Protein: not meeting estimated needs    Patient Education    Education Provided: renal diet  Teaching Method: explanation and printed materials  Comprehension: needs further teaching and needs reinforcement  Barriers to Learning: desire/motivation  Expected Compliance: fair  Comments: All questions were answered and dietitian's contact information was provided.     Nutrition Diagnosis     PES: Inadequate oral intake related to acute illness as evidenced by SOB/on bipap/ eating < 25% . (improving)    Interventions/Goals     Intervention(s): general/healthful diet, commercial beverage, multivitamin/mineral supplement therapy, and collaboration with other providers  Goal: Meet greater than 75% of nutritional needs by follow-up. (goal progressing)    Monitoring & Evaluation     Dietitian will monitor food and beverage intake, weight, and electrolyte/renal panel.  Nutrition Risk/Follow-Up: moderate (follow-up in 3-5 days)   Please consult if re-assessment needed sooner.

## 2023-06-12 NOTE — CONSULTS
"[facility]  General Surgery - [facility] Team  H&P Note  Admit Date: 6/2/2023  HD#10    Patient Name: Ryan Wild  YOB: 1990  Date: 06/12/2023 12:19 PM  Date of Admission: 6/2/2023    PRESENTING HISTORY   Reason for Consult: Tunneled HD line placement    History of Present Illness:  32M w/PMHx of polysubstance abuse (meth use 2 days prior to ED visit) who was brought in to the ER by his coworkers for malaise lasting 12 hours. He was found to have rhabdomyolysis.  Surgery initially placed temporary RIJ HD catheter on 6/2 given electrolyte derangements. He has continued to require dialysis for KINGSLEY and electrolyte abnormalities. Surgery team was consulted for tunneled HD line placement.     Review of Systems:  12 point ROS negative except as stated in HPI    PAST HISTORY:   Past medical history:  Past Medical History:   Diagnosis Date    Depression     Opioid abuse     Seizures     Solitary kidney, congenital     Unspecified viral hepatitis C without hepatic coma        Past surgical history:  Past Surgical History:   Procedure Laterality Date    TONSILLECTOMY         Family history:  unknown    Social history:  Tobacco: vapes daily  Alcohol: no  Drug use: methamphetamines, fentanyl    MEDICATIONS & ALLERGIES:   Home meds: as in chart    Allergies: NKDA    OBJECTIVE:   Vital Signs:  VITAL SIGNS: 24 HR MIN & MAX LAST   Temp  Min: 98 °F (36.7 °C)  Max: 98.9 °F (37.2 °C)  98.3 °F (36.8 °C)   BP  Min: 135/86  Max: 158/91  (!) 158/91    Pulse  Min: 91  Max: 105  91    Resp  Min: 16  Max: 20  20    SpO2  Min: 92 %  Max: 98 %  95 %      HT: 5' 7" (170.2 cm)  WT: 69.4 kg (153 lb)  BMI: 24     Physical Exam:  General: Well developed, well nourished, no acute distress  HEENT: NCAT, PERRL. HD line in R neck.  CV: RR  Resp: NWOB on RA  GI: Not distended  :  Deferred  MSK: Moves all extremities spontaneously and purposefully  Skin/wounds:  No rashes, ulcers, erythema  Neuro:  CNII-XII grossly " intact, A&Ox3    Labs:  Recent Labs     06/10/23  0404 06/11/23  0626 06/12/23  0319   WBC 17.42*  --  20.01*   HGB 10.9*  --  10.6*   HCT 33.3*  --  31.8*     --  365    140 139   K 4.0 3.8 4.2   CO2 21* 25 23   BUN 30.1* 30.8* 41.9*   CREATININE 3.70* 3.81* 4.30*   BILITOT 0.5 0.4 0.3   AST 87* 62* 60*   * 125* 117*   ALKPHOS 30* 32* 41   CALCIUM 8.2* 8.3* 8.2*   ALBUMIN 1.7* 1.7* 1.8*   MG 1.90 1.90 2.00   PHOS 3.9 4.0 4.7       Imaging:  X-Ray Chest 1 View   Final Result      Some improvement in the changes of pulmonary vascular congestion and cardiac decompensation.      No other significant change         Electronically signed by: Emmanuel Waldron   Date:    06/12/2023   Time:    08:49      CT Cervical Spine Without Contrast   Final Result      CT Chest Abdomen Pelvis W W/O Contrast (XPD)   Final Result      X-Ray Chest 1 View   Final Result      Persistent pulmonary edema with slight improvement.         Electronically signed by: Eliazar Gibson MD   Date:    06/10/2023   Time:    10:52      X-Ray Chest 1 View   Final Result      Worsening consolidative changes more so on the left than on the right with some worsening also in the right upper lobe.      No other change         Electronically signed by: Emmanuel Waldron   Date:    06/08/2023   Time:    09:25      X-Ray Chest 1 View   Final Result   .   No significant interval change.         Electronically signed by: Adi Amato   Date:    06/07/2023   Time:    07:26      X-Ray Chest 1 View   Final Result      No significant interval change.         Electronically signed by: Ruiz Elam   Date:    06/06/2023   Time:    06:44      X-Ray Chest 1 View   Final Result      No adverse interval change.  Slight interval improvement.         Electronically signed by: Franco Gonzáles   Date:    06/05/2023   Time:    06:43      X-Ray Chest 1 View   Final Result      Unchanged multifocal right greater than left airspace opacities most consistent with  multifocal pneumonia.         Electronically signed by: Eliazar Ronquillo MD   Date:    06/03/2023   Time:    18:58      X-Ray Chest 1 View   Final Result      As above.         Electronically signed by: Franco Gonzáles   Date:    06/03/2023   Time:    11:48      X-Ray Chest 1 View   Final Result      Interval placement of dialysis catheter with concern for developing right-sided effusion.  Hemothorax is not entirely excluded.         Electronically signed by: Eliazar Gibson MD   Date:    06/02/2023   Time:    22:26      CT Chest Abdomen Pelvis Without Contrast (XPD)   Final Result      1. Multifocal lung consolidation, likely pneumonia.   2. Small volume ascites with nonspecific gallbladder wall thickening.  Some of the ascitic fluid in the pelvis appears complex.   3. Mild hepatomegaly.         Electronically signed by: Arley Cuevas   Date:    06/02/2023   Time:    18:40      US Retroperitoneal Limited   Final Result      X-Ray Chest AP Portable   Final Result      Findings concerning for right mid to lower lung infectious process.         Electronically signed by: Franco Gonzáles   Date:    06/02/2023   Time:    13:48         I have reviewed all pertinent imaging results/findings within the past 24 hours.      ASSESSMENT & PLAN:    32M w/PMHx of polysubstance abuse admitted to the ICU w/ rhabdomyolysis. Surgery consulted for emergent tunneled HD catheter.     - Plan for tunneled HD line placement Friday  - Pt is booked. Will consent.   - Please make pt NPO at midnight prior to surgery       Esme Harley MD   [facility] General Surgery, PGY-1

## 2023-06-12 NOTE — H&P (VIEW-ONLY)
"[facility]  General Surgery - [facility] Team  H&P Note  Admit Date: 6/2/2023  HD#10    Patient Name: Ryan Wild  YOB: 1990  Date: 06/12/2023 12:19 PM  Date of Admission: 6/2/2023    PRESENTING HISTORY   Reason for Consult: Tunneled HD line placement    History of Present Illness:  32M w/PMHx of polysubstance abuse (meth use 2 days prior to ED visit) who was brought in to the ER by his coworkers for malaise lasting 12 hours. He was found to have rhabdomyolysis.  Surgery initially placed temporary RIJ HD catheter on 6/2 given electrolyte derangements. He has continued to require dialysis for KINGSLEY and electrolyte abnormalities. Surgery team was consulted for tunneled HD line placement.     Review of Systems:  12 point ROS negative except as stated in HPI    PAST HISTORY:   Past medical history:  Past Medical History:   Diagnosis Date    Depression     Opioid abuse     Seizures     Solitary kidney, congenital     Unspecified viral hepatitis C without hepatic coma        Past surgical history:  Past Surgical History:   Procedure Laterality Date    TONSILLECTOMY         Family history:  unknown    Social history:  Tobacco: vapes daily  Alcohol: no  Drug use: methamphetamines, fentanyl    MEDICATIONS & ALLERGIES:   Home meds: as in chart    Allergies: NKDA    OBJECTIVE:   Vital Signs:  VITAL SIGNS: 24 HR MIN & MAX LAST   Temp  Min: 98 °F (36.7 °C)  Max: 98.9 °F (37.2 °C)  98.3 °F (36.8 °C)   BP  Min: 135/86  Max: 158/91  (!) 158/91    Pulse  Min: 91  Max: 105  91    Resp  Min: 16  Max: 20  20    SpO2  Min: 92 %  Max: 98 %  95 %      HT: 5' 7" (170.2 cm)  WT: 69.4 kg (153 lb)  BMI: 24     Physical Exam:  General: Well developed, well nourished, no acute distress  HEENT: NCAT, PERRL. HD line in R neck.  CV: RR  Resp: NWOB on RA  GI: Not distended  :  Deferred  MSK: Moves all extremities spontaneously and purposefully  Skin/wounds:  No rashes, ulcers, erythema  Neuro:  CNII-XII grossly " intact, A&Ox3    Labs:  Recent Labs     06/10/23  0404 06/11/23  0626 06/12/23  0319   WBC 17.42*  --  20.01*   HGB 10.9*  --  10.6*   HCT 33.3*  --  31.8*     --  365    140 139   K 4.0 3.8 4.2   CO2 21* 25 23   BUN 30.1* 30.8* 41.9*   CREATININE 3.70* 3.81* 4.30*   BILITOT 0.5 0.4 0.3   AST 87* 62* 60*   * 125* 117*   ALKPHOS 30* 32* 41   CALCIUM 8.2* 8.3* 8.2*   ALBUMIN 1.7* 1.7* 1.8*   MG 1.90 1.90 2.00   PHOS 3.9 4.0 4.7       Imaging:  X-Ray Chest 1 View   Final Result      Some improvement in the changes of pulmonary vascular congestion and cardiac decompensation.      No other significant change         Electronically signed by: Emmanuel Waldron   Date:    06/12/2023   Time:    08:49      CT Cervical Spine Without Contrast   Final Result      CT Chest Abdomen Pelvis W W/O Contrast (XPD)   Final Result      X-Ray Chest 1 View   Final Result      Persistent pulmonary edema with slight improvement.         Electronically signed by: Eliazar Gibson MD   Date:    06/10/2023   Time:    10:52      X-Ray Chest 1 View   Final Result      Worsening consolidative changes more so on the left than on the right with some worsening also in the right upper lobe.      No other change         Electronically signed by: Emmanuel Waldron   Date:    06/08/2023   Time:    09:25      X-Ray Chest 1 View   Final Result   .   No significant interval change.         Electronically signed by: Adi Amato   Date:    06/07/2023   Time:    07:26      X-Ray Chest 1 View   Final Result      No significant interval change.         Electronically signed by: Ruiz Elam   Date:    06/06/2023   Time:    06:44      X-Ray Chest 1 View   Final Result      No adverse interval change.  Slight interval improvement.         Electronically signed by: Franco Gonzáles   Date:    06/05/2023   Time:    06:43      X-Ray Chest 1 View   Final Result      Unchanged multifocal right greater than left airspace opacities most consistent with  multifocal pneumonia.         Electronically signed by: Eliazar Ronquillo MD   Date:    06/03/2023   Time:    18:58      X-Ray Chest 1 View   Final Result      As above.         Electronically signed by: Franco Gonzáles   Date:    06/03/2023   Time:    11:48      X-Ray Chest 1 View   Final Result      Interval placement of dialysis catheter with concern for developing right-sided effusion.  Hemothorax is not entirely excluded.         Electronically signed by: Eliazar Gibson MD   Date:    06/02/2023   Time:    22:26      CT Chest Abdomen Pelvis Without Contrast (XPD)   Final Result      1. Multifocal lung consolidation, likely pneumonia.   2. Small volume ascites with nonspecific gallbladder wall thickening.  Some of the ascitic fluid in the pelvis appears complex.   3. Mild hepatomegaly.         Electronically signed by: Arley Cuevas   Date:    06/02/2023   Time:    18:40      US Retroperitoneal Limited   Final Result      X-Ray Chest AP Portable   Final Result      Findings concerning for right mid to lower lung infectious process.         Electronically signed by: Franco Gonzáles   Date:    06/02/2023   Time:    13:48         I have reviewed all pertinent imaging results/findings within the past 24 hours.      ASSESSMENT & PLAN:    32M w/PMHx of polysubstance abuse admitted to the ICU w/ rhabdomyolysis. Surgery consulted for emergent tunneled HD catheter.     - Plan for tunneled HD line placement Friday  - Pt is booked. Will consent.   - Please make pt NPO at midnight prior to surgery       Esme Harley MD   [facility] General Surgery, PGY-1

## 2023-06-12 NOTE — PROGRESS NOTES
Discussed consult for LTAC placement & OP Dialysis with pt & mother, Jo Ann Kendall ([phone]). [facility]. Referral submitted to [facility] for Dialysis set up through [facility].

## 2023-06-12 NOTE — PROGRESS NOTES
[facility]    Progress Note      Patient Name: Ryan Wild  MRN: [id number]  Admission Date: 6/2/2023  Attending Physician: Naida Egan MD   Primary Care Provider: Adilene Dillon NP    Patient information was obtained from patient and ER records.     Subjective:     Principal Problem:Non-traumatic rhabdomyolysis    Chief Complaint:   Chief Complaint   Patient presents with    Back Pain    Hypotension    Hearing Problem     PT REPORTS WAKING UP W LOWER BACK PAIN, WEAKNESS, SOB AND HEARING LOSS. 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  DENIES DRUG USE. HX OF SEIZURES, NON COMPLIANT W MEDS > 1 MONTH.  .  EKG OBTAINED.         HPI: Ryan Wild is a 32 y.o. male with history of seizures (off Keppra), drug use, hepatitis C (treated), and solitary kidney who presented to the ED on 6/2/2023  with a primary complaint of back pain. Patient is a  who had worked a 12 hour shift out in the sun the day prior. States that he went to bed feeling great, without any complaints. Then he woke up late for work day of admission and admits to not remembering much of what happened afterwards. His coworkers brought him to the ED. Admits to severe back pain that is aggravated by movement, weakness, shortness of breath. Also has not urinated all day. Denies chest pain, palpitations, headache, nausea, vomiting, abdominal pain, fevers. Last IV drug use 1 year ago, but snorted meth 2 days ago. Also uses IM steroids, last injection last night. Has not taken his home Keppra for the past few months; his last seizure was on 8/2022.      In the ED, patient presented hypotensive, tachycardic, SpO2 89% on RA. He was placed on bipap. Vasopressors were started when patient continued to be hypotensive with IVF. Labs significant for WBC 42.1, K 6.9, CO2 14, Cr 3.77, , . CPK 37k. LDH 2,2k. CBG normal. Troponin 1.878,  EKG no ischemic changes. Lactic 8.0. CXR  with increased interstitial markings in the right greater than left lung. Bedside Echo no abnormalities. Pt was given Vanc/Zosyn x1, and 4L bolus NS total. Medicine was called to admit patient for septic shock with multiorgan dysfunction.    Hospital course:  06/03/2023: Patient started on HD emergently, Levophed, Precedex, BiPAP, good response to Lasix  06/04/2023:  Recommend repeat HD today, nephro to see, off Levophed, continue Precedex, BiPAP, consider proning after dialysis, if no dialysis planned we will give repeat dose of Lasix.  6.5.23: No acute events overnight, patient remains on Precedex at max. Concerned about his status and why he is still in-house. Unable to tolerate vapotherm or proning for any appreciable amount of time. Currently being dialyzed.   6.6.23: NAEON. HD yesterday. Removed 0.5-1L ultrafiltrate. Well tolerated.  Able to wean off BIPAP to CPAP. HFNC at supper time and able to tolerate small meal. Back on CPAP at night. Still requiring sedation with Precedex. Currently on Vapotherm at 100% O2.  6.7.23: Desatted to low 79-80s on vapotherm. Initially refused to be placed back CPAP. However, was reasonable to be return to it after speaking with ICU nursing staff. Has been irritable and still reports he is in pain. Received Morphine x1. Still on Precedex 1.4mcg. Appears untouched. Has Net positive fluid balance.   6.8.23: Dialyzed yesterday morning. Removed approx 1.5L. Had an additional 850mL in urinary output. Tolerated Vapotherm. O2 sats remained in <90s.   CK improved prior to dialyzing. He still reports significant back pain requiring scheduled dilaudid. No longer on Precedex.   Has an appetite, but not eating much.   6.9.23: Downgraded to tele. Feels his breathing is better, however is more swollen than normal. Complaints of low back and bilateral limb pain. Requesting Dilaudid every three hours. Appetite improving. Urinating, however states he  doesn't know when happening.  6.10.23: NAEO.  Tolerating HFNC.  Patient continues to report whole-body pain which is worse in his back.  His oxygen requirements have not significantly decreased.  We will have the patient work with PT/OT.  CK continues to downtrend, does have persistent leukocytosis.  Cultures have grown nothing.  He has received 8 days of pneumonia coverage.  6.11.23: NAEON. VSS. Received HD one day ago. >2L UF removed. Still feeling swollen. Oxygen requirements have not decreased significantly. Patient participated, but did not tolerate PT due to low back and leg pain. Reports pain not well controlled with Dilaudid and Norco 10. Appetite improving.      Interval history:  NAEON. VSS. Saturating well on Vapotherm. Still with visible swelling to upper extremities and penis. Lower extremities have improved. Back pain with some improvement.  Swelling also improved.  Added Robaxin (renal dose) to pain regimen. No other issues or concerns at this time.      Past Medical History:   Diagnosis Date    Depression     Opioid abuse     Seizures     Solitary kidney, congenital     Unspecified viral hepatitis C without hepatic coma        Past Surgical History:   Procedure Laterality Date    TONSILLECTOMY         Review of patient's allergies indicates:  No Known Allergies    No current facility-administered medications on file prior to encounter.     Current Outpatient Medications on File Prior to Encounter   Medication Sig    albuterol (PROVENTIL/VENTOLIN HFA) 90 mcg/actuation inhaler INHALE 2 PUFFS BY MOUTH EVERY 6 HOURS AS NEEDED FOR SHORTNESS OF BREATH OR WHEEZING    fluticasone propionate (FLONASE) 50 mcg/actuation nasal spray SHAKE LIQUID AND USE 2 SPRAYS IN EACH NOSTRIL DAILY    levETIRAcetam (KEPPRA) 500 MG Tab Take 1 tablet by mouth 2 (two) times daily.    QUEtiapine (SEROQUEL) 100 MG Tab Take 1 tablet (100 mg total) by mouth nightly.    valACYclovir (VALTREX) 1000 MG tablet Take 1,000 mg by mouth 3  (three) times daily.    venlafaxine (EFFEXOR-XR) 37.5 MG 24 hr capsule Take 37.5 mg by mouth every morning.     Family History       Problem Relation (Age of Onset)    Cerebral aneurysm Father          Tobacco Use    Smoking status: Every Day     Types: Vaping with nicotine    Smokeless tobacco: Current   Substance and Sexual Activity    Alcohol use: Not Currently    Drug use: Not Currently     Types: Heroin, Methamphetamines     Comment: 3 years sober    Sexual activity: Yes     Partners: Female     Review of Systems   Constitutional:  Negative for chills and fever.   Gastrointestinal:  Negative for abdominal pain, nausea and vomiting.   Genitourinary:  Positive for penile pain and penile swelling. Negative for difficulty urinating, dysuria and scrotal swelling.   Musculoskeletal:  Positive for back pain.   Neurological:  Positive for weakness. Negative for headaches.   Psychiatric/Behavioral:  Negative for hallucinations. The patient is not nervous/anxious.    Objective:     Vital Signs (Most Recent):  Temp: 98 °F (36.7 °C) (06/11/23 2330)  Pulse: 105 (06/11/23 2330)  Resp: 18 (06/12/23 0620)  BP: (!) 151/85 (06/12/23 0620)  SpO2: (!) 92 % (06/12/23 0620) Vital Signs (24h Range):  Temp:  [98 °F (36.7 °C)-98.8 °F (37.1 °C)] 98 °F (36.7 °C)  Pulse:  [] 105  Resp:  [16-20] 18  SpO2:  [92 %-98 %] 92 %  BP: (135-156)/(78-86) 151/85     Weight: 69.4 kg (153 lb)  Body mass index is 23.96 kg/m².    Physical Exam  Constitutional:       General: He is awake. He is not in acute distress.     Appearance: Normal appearance. He is normal weight. He is not ill-appearing or diaphoretic.      Comments: Lying in bed on Vapotherm. Awake and alert.   Eyes:      Extraocular Movements: Extraocular movements intact.      Conjunctiva/sclera: Conjunctivae normal.   Cardiovascular:      Rate and Rhythm: Normal rate and regular rhythm.      Pulses: Normal pulses.           Dorsalis pedis pulses are 2+ on the right side and 2+ on the  left side.      Heart sounds: Normal heart sounds.      Arteriovenous access: Right and left arteriovenous access is present.  Pulmonary:      Effort: Pulmonary effort is normal.      Breath sounds: Normal air entry. No wheezing.   Chest:      Chest wall: No tenderness.   Abdominal:      General: Abdomen is flat. Bowel sounds are normal.      Palpations: Abdomen is soft.      Tenderness: There is no abdominal tenderness. There is no guarding.      Comments: Taut abdomen. Nonpitting edema.   Genitourinary:     Testes: Normal.   Musculoskeletal:         General: Tenderness present. Normal range of motion.      Lumbar back: Deformity and tenderness present. No signs of trauma or lacerations.      Right knee: Normal.      Left knee: Normal.      Right lower leg: Tenderness present. No edema.      Left lower leg: Tenderness present. No edema.      Right foot: Swelling present. No deformity.      Left foot: Swelling present.   Feet:      Right foot:      Skin integrity: Warmth present.      Left foot:      Skin integrity: Skin integrity normal.   Skin:     General: Skin is warm.      Capillary Refill: Capillary refill takes 2 to 3 seconds.      Coloration: Skin is not cyanotic, jaundiced or mottled.   Neurological:      Mental Status: He is alert and oriented to person, place, and time.      GCS: GCS eye subscore is 4. GCS verbal subscore is 5. GCS motor subscore is 6.   Psychiatric:         Mood and Affect: Mood normal.         Behavior: Behavior is cooperative.          Significant Labs: All pertinent labs within the past 24 hours have been reviewed.     Latest Reference Range & Units 06/12/23 03:19   WBC 4.50 - 11.50 x10(3)/mcL 20.01 (H)   RBC 4.70 - 6.10 x10(6)/mcL 3.61 (L)   Hemoglobin 14.0 - 18.0 g/dL 10.6 (L)   Hematocrit 42.0 - 52.0 % 31.8 (L)   MCV 80.0 - 94.0 fL 88.1   MCH 27.0 - 31.0 pg 29.4   MCHC 33.0 - 36.0 g/dL 33.3   RDW 11.5 - 17.0 % 12.9   Platelets 130 - 400 x10(3)/mcL 365   MPV 7.4 - 10.4 fL 9.7   Neut  % % 80.0   LYMPH % % 7.2   Mono % % 11.0   Eosinophil % % 0.3   Basophil % % 0.1   Immature Granulocytes % 1.4   Neut # 2.1 - 9.2 x10(3)/mcL 15.97 (H)   Lymph # 0.6 - 4.6 x10(3)/mcL 1.45   Mono # 0.1 - 1.3 x10(3)/mcL 2.21 (H)   Eos # 0 - 0.9 x10(3)/mcL 0.06   Baso # <=0.2 x10(3)/mcL 0.03   Immature Grans (Abs) 0 - 0.04 x10(3)/mcL 0.29 (H)   nRBC % 0.0   Sodium 136 - 145 mmol/L 139   Potassium 3.5 - 5.1 mmol/L 4.2   Chloride 98 - 107 mmol/L 105   CO2 22 - 29 mmol/L 23   BUN 8.9 - 20.6 mg/dL 41.9 (H)   Creatinine 0.73 - 1.18 mg/dL 4.30 (H)   eGFR mls/min/1.73/m2 18   Glucose 74 - 100 mg/dL 107 (H)   Calcium 8.4 - 10.2 mg/dL 8.2 (L)   Phosphorus 2.3 - 4.7 mg/dL 4.7   Magnesium 1.60 - 2.60 mg/dL 2.00   Alkaline Phosphatase 40 - 150 unit/L 41   PROTEIN TOTAL 6.4 - 8.3 gm/dL 5.3 (L)   Albumin 3.5 - 5.0 g/dL 1.8 (L)   Albumin/Globulin Ratio 1.1 - 2.0 ratio 0.5 (L)   BILIRUBIN TOTAL <=1.5 mg/dL 0.3   AST 5 - 34 unit/L 60 (H)   ALT 0 - 55 unit/L 117 (H)   Globulin, Total 2.4 - 3.5 gm/dL 3.5   CPK 30 - 200 U/L 459 (H)   (H): Data is abnormally high  (L): Data is abnormally low     Latest Reference Range & Units 06/12/23 04:34   POC PH 7.350 - 7.450  7.440   POC PO2 75.0 - 100.0 mmHg 102.0 (H)   POC HCO3 22.0 - 26.0 mmol/L 29.2 (H)   Sample site  Right Radial Artery   Drawn by  LETITIA   pCO2, Blood gas 35.0 - 45.0 mmHg 43.0   THb, Blood gas 12 - 18 g/dL 10.2 (L)   sO2, Blood gas >=90.0 % 99.5   O2 Hb, Blood Gas 94.0 - 100.0 % 96.5   TOC2, Blood gas 22.0 - 26.0 mmol/L 30.5 (H)   pAO2 mmHg 481   A-aDO2 mmHg 379   FIO2, Blood gas % 75.0   LPM  13.0   Allens Test  Yes   Oxygen Device, Blood gas  High Flow Cannula   (H): Data is abnormally high  (L): Data is abnormally low    Significant Imaging: I have reviewed all pertinent imaging results/findings within the past 24 hours.    CXR 6.12.23 My read:  Portable chest x-ray.  Patient appears rotated to the right slightly.  No bony deformities or fractures appreciated.  Cardiac silhouette  appears to be normal but partially obscured inferiorly.  Bilateral consolidations appear improved, with less focal density and less his at the right lung base.    Assessment/Plan:   Methamphetamine/fentanyl overdose  Acute renal failure secondary to rhabdo secondary to above- Improving  Whole-body pain  Low back pain  Complex abdominal fluid collection  Fluid overloaded  Hyperkalemia- Resolved with dialysis  HTN 2/2 above  -Continues to improve. CK downtrending. Nephrology following.   -planning for HD sessions per MWF schedule while inpatient.  - PT/OT following.  Recommend more intensive sessions.  Patient motivated and wants to participate.  - now on multimodal pain control.    Pneumonia-community-acquired with possible component of aspiration.  Leukocytosis  Sepsis secondary to above- Resolved  Shock 2/2 above - Resolved  -completed 8 days of abx  -chest x-ray appears to be improving  - leukocytosis thought to be 2/2 sepsis and improved with fluids and abx initially  - leukocytosis has begun improving w/ day 5/5 of steroids for severe CAP.  We will discontinue steroids after p.o. dose today.    Intracardiac shunting  NSTEMI type 2, however given high peak concern for type 1  -Echo 50%EF (normal). Positive for intracardiac shunt. Cardiology following. Possible EUGENIA once hemodynamically stable.   - Discontinued heparin gtt previously as pt remained asymptomatic with normal EF and no RWMA.     Seizure disorder  - Continue Keppra 500mg po. Keppra level <2.0 (6.2.23). No seizure activity to date.    History of polysubstance use disorder  History of anabolic steroid abuse  - Case management consulted for rehab/substance abuse assistance.        CODE STATUS: FULL   Access: HD line, art line, PIV  Antibiotics:  Completed 8 days of vanc/Zosyn  Diet: Regular  DVT Prophylaxis: Heparin 5K units BID.  Fluids: None.    GI Prophylaxis: Compazine PRN.  Oxygen requirements:  Vapotherm, 8 LPM, 62%  Pain: IV Dilaudid 0.5mg q3hPRN;  PO Norco 10 q6hPRN, Robaxin 500 mg t.i.d. (renal dose), fentanyl patch 50 mcg p.r.n..  Sedation: None        Disposition: Continue pain medications, Deescalate as appropriate. Continue Oxygen status monitoring. Wean supplemental O2 as tolerated. HD per nephrology recs.     Dillon Dominguez MD  Department of Hospital Medicine

## 2023-06-12 NOTE — PLAN OF CARE
Problem: Occupational Therapy  Goal: Occupational Therapy Goal  Description: Goals to be met by: d/c     Patient will increase functional independence with ADLs by performing:    LE Dressing with Modified [place].    Grooming while standing at sink with [place].    Toileting from toilet with Modified [place] for hygiene and clothing management.     Toilet transfer to toilet with Modified [place].    Outcome: Ongoing, Progressing

## 2023-06-12 NOTE — PROGRESS NOTES
"[facility]  Nephrology Progress Note    Patient Name: Ryan Wild  Age: 32 y.o.  : 1990  MRN: [id number]  Admission Date: 2023    Chief complaint: Back Pain, Hypotension, and Hearing Problem (PT REPORTS WAKING UP W LOWER BACK PAIN, WEAKNESS, SOB AND HEARING LOSS. 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  DENIES DRUG USE. HX OF SEIZURES, NON COMPLIANT W MEDS > 1 MONTH.  .  EKG OBTAINED. )      Hospital course  Ryan Wild is a 32 y.o. White male with past medical history of seizures, polysubstance abuse, treated hepatitis-C, and solitary kidney.  Patient presented to the emergency department on 2023 with complaints of back pain, shortness breath, and decreased urinary frequency.  Patient believes that symptoms were precipitated by the fact that he has been working outside in the heat for 12 hours. He admitted to polysubstance abuse, including intravenous drug use, and anabolic steroid use.  UDS was positive for methamphetamines and fentanyl. In the emergency department, patient was tachycardic, hypotensive, and hypoxemic.  Laboratory results were remarkable for severe leukocytosis (WBC 41), azotemia, metabolic acidosis, hyperkalemia (serum potassium 6.9), transaminitis, hyperphosphatemia, elevated troponin, and elevated lactic acid level.  CK was 37,420 units per L.  Patient was admitted to the intensive care unit, placed on BiPAP, and emergently dialyzed for correction of life-threatening hyperkalemia on 2023.  Nephrology is continuing to follow for assistance with management of acute kidney injury and multiple electrolyte/acid-base abnormalities    Subjective  Patient is resting in bed. On 62% FiO2 at 10L via Vapotherm. No acute complaints.     Review of Systems  Negative except as stated above    Objective  BP (!) 151/85   Pulse 105   Temp 98 °F (36.7 °C) (Oral)   Resp 20   Ht 5' 7" (1.702 m)  "  Wt 69.4 kg (153 lb)   SpO2 (!) 92%   BMI 23.96 kg/m²     Intake/Output Summary (Last 24 hours) at 6/12/2023 0914  Last data filed at 6/11/2023 1902  Gross per 24 hour   Intake 680 ml   Output --   Net 680 ml       Physical Exam  General appearance: Patient is in no acute distress. On Vapotherm  HEENT: EOMI, no JVD. Neck is supple.  Right IJ dialysis catheter  Chest:   Lung sounds are diminished to auscultation.    Heart: S1, S2.   Abdomen:  Soft, nondistended.  :  Deferred  Extremities: trace generalized edema, peripheral pulses are palpable.   Neuro: No focal deficits.  Alert and oriented.    Medications    Current Facility-Administered Medications:     bisacodyL suppository 10 mg, 10 mg, Rectal, Daily PRN, Dominic Armijo MD    calcium carbonate 200 mg calcium (500 mg) chewable tablet 500 mg, 500 mg, Oral, Daily PRN, Dillon Dominguez MD, 500 mg at 06/11/23 1444    calcium gluconate 1 g in NS IVPB (premixed), 1 g, Intravenous, PRN, Lm Yanes MD, Stopped at 06/04/23 0346    calcium gluconate 1 g in NS IVPB (premixed), 2 g, Intravenous, PRN, Lm Yanes MD    calcium gluconate 1 g in NS IVPB (premixed), 3 g, Intravenous, PRN, Lm Yanes MD    dextrose 10% bolus 125 mL 125 mL, 12.5 g, Intravenous, PRN, Alda Valladares MD, Stopped at 06/02/23 2234    dextrose 40 % gel 15,000 mg, 15 g, Oral, PRN, Alda Valladares MD    dextrose 40 % gel 30,000 mg, 30 g, Oral, PRN, Alda Valladares MD    fentaNYL 50 mcg/hr 1 patch, 1 patch, Transdermal, Q72H, Dillon Dominguez MD, 1 patch at 06/12/23 0828    gabapentin capsule 300 mg, 300 mg, Oral, TID, Dillon Dominguez MD, 300 mg at 06/12/23 0827    glucagon (human recombinant) injection 1 mg, 1 mg, Intramuscular, PRN, Alda Valladares MD    heparin (porcine) injection 5,000 Units, 5,000 Units, Subcutaneous, Q12H, Dominic Armijo MD, 5,000 Units at 06/12/23 0827    HYDROcodone-acetaminophen  mg per tablet 1 tablet, 1 tablet, Oral, Q6H PRN, Dillon Dominguez MD, 1 tablet at 06/12/23  0620    HYDROmorphone injection 0.5 mg, 0.5 mg, Intravenous, Q3H PRN, Dillon Dominguez MD, 0.5 mg at 06/12/23 0407    insulin regular injection 6.94 Units 0.0694 mL, 0.1 Units/kg, Intravenous, PRN, Dominic Armijo MD, 6.94 Units at 06/02/23 2112    levETIRAcetam tablet 500 mg, 500 mg, Oral, BID, Dillon Dominguez MD, 500 mg at 06/12/23 0827    LIDOcaine 5 % patch 1 patch, 1 patch, Transdermal, Q24H, Dillon Dominguez MD, 1 patch at 06/10/23 1651    melatonin tablet 9 mg, 9 mg, Oral, Nightly PRN, Adolfo Turcios DO, 9 mg at 06/11/23 0211    methocarbamoL tablet 500 mg, 500 mg, Oral, TID PRN, Dillon Dominguez MD, 500 mg at 06/11/23 1444    naloxone 0.4 mg/mL injection 0.02 mg, 0.02 mg, Intravenous, PRN, Alda Valladares MD    polyethylene glycol packet 17 g, 17 g, Oral, BID, Dominic Armijo MD, 17 g at 06/11/23 2018    prochlorperazine tablet 10 mg, 10 mg, Oral, TID PRN, Dillon Dominguez MD, 10 mg at 06/08/23 0813    QUEtiapine tablet 100 mg, 100 mg, Oral, Daily, Dillon Dominguez MD, 100 mg at 06/12/23 0827    sodium chloride 0.9% flush 10 mL, 10 mL, Intravenous, Q12H PRN, Alda Valladares MD    tamsulosin 24 hr capsule 0.4 mg, 0.4 mg, Oral, Daily, Dillon Dominguez MD, 0.4 mg at 06/12/23 0827     Imaging:    Reviewed    Laboratory Data:  Hematology  Lab Results   Component Value Date    WBC 20.01 (H) 06/12/2023    HGB 10.6 (L) 06/12/2023    HCT 31.8 (L) 06/12/2023     06/12/2023     06/12/2023    K 4.2 06/12/2023    CHLORIDE 105 06/12/2023    CO2 23 06/12/2023    BUN 41.9 (H) 06/12/2023    CREATININE 4.30 (H) 06/12/2023    EGFRNORACEVR 18 06/12/2023    GLUCOSE 107 (H) 06/12/2023    CALCIUM 8.2 (L) 06/12/2023    ALKPHOS 41 06/12/2023    LABPROT 5.3 (L) 06/12/2023    ALBUMIN 1.8 (L) 06/12/2023    AST 60 (H) 06/12/2023     (H) 06/12/2023    MG 2.00 06/12/2023    PHOS 4.7 06/12/2023     Lab Results   Component Value Date    FERRITIN 153.40 09/14/2022    LDH 2,294 (H) 06/02/2023       Lab Results   Component Value Date    HIV  Nonreactive 09/14/2022    HEPBSURFAG Nonreactive 06/02/2023    HEPBSAB Nonreactive 09/14/2022    HEPBCAB Nonreactive 09/14/2022         Impression  Nonoliguric acute kidney injury, 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 and/or hypoperfusion  Solitary kidney  Rhabdomyolysis - resolved  Transaminitis, improving  Multifocal pneumonia   Respiratory failure on Vapotherm  NSTEMI  History of seizure disorder   Polysubstance abuse with amphetamine/fentanyl  Anabolic steroid use  Treated hepatitis-C    Plan  Will continue hemodialysis per Monday, Wednesday, Friday schedule while hospitalized   Repeating UA and will plan for alkalinization with bicarb drip if pH is less than 7  Spoke to vascular surgery for tunneled hemodialysis catheter insertion this week   Consulted case management for outpatient hemodialysis set up assistance  Wean oxygen as tolerated   Continue supportive care    Nate Kramer MD, PGY-3

## 2023-06-12 NOTE — PT/OT/SLP EVAL
Occupational Therapy   Evaluation    Name: Ryan Wild  MRN: [id number]  Admitting Diagnosis: Non-traumatic rhabdomyolysis  Final diagnoses:  [I95.9] Hypotension  [R53.1] Weakness  [A41.9, R65.21] Septic shock (Primary)  [J18.9] Community acquired pneumonia of right lung, unspecified part of lung  [N17.9] Acute renal failure, unspecified acute renal failure type  [E87.20] Metabolic acidosis  [E87.5] Hyperkalemia  [M62.82] Non-traumatic rhabdomyolysis  [R77.8] Troponin I above reference range   Patient Active Problem List   Diagnosis    Hepatitis C virus infection without hepatic coma    Other bipolar disorder    Opioid use disorder, moderate, in sustained remission    Amphetamine use disorder, severe, in sustained remission    KINGSLEY (acute kidney injury)    NSTEMI (non-ST elevated myocardial infarction)    Non-traumatic rhabdomyolysis    Shock    Solitary kidney, congenital    Hyperkalemia    Hypermagnesemia    Elevated levels of transaminase & lactic acid dehydrogenase    Seizure disorder      Recent Surgery: * No surgery found *      Recommendations:     Discharge Recommendations: home, outpatient PT  Discharge Equipment Recommendations:  other (see comments) (TBD pending progress)  Barriers to discharge:  Other (Comment) (level of skilled assist at this time; medical condition and dx; pain)    Assessment:     Ryan Wild is a 32 y.o. male with a medical diagnosis of Non-traumatic rhabdomyolysis.  He presents with functional decline . Performance deficits affecting function: weakness, impaired endurance, impaired self care skills, impaired functional mobility, gait instability, impaired balance, decreased lower extremity function, pain, decreased ROM, edema, impaired cardiopulmonary response to activity.      Rehab Prognosis: Good; patient would benefit from acute skilled OT services to address these deficits and reach maximum level of function.       Plan:     Patient to be seen 3 x/week to address the above  listed problems via self-care/home management, therapeutic activities, therapeutic exercises  Plan of Care Expires:  (d/c)  Plan of Care Reviewed with: patient    Subjective     Chief Complaint: swelling B LE and generalized pain  Patient/Family Comments/goals: decrease swelling and get stronger    Occupational Profile:  Living Environment: Pt lives in single story home with friends and with no steps to enter and tub shower combo.   Previous level of function: independent basic and I ADL's and ambulated without AD  Roles and Routines: Pt was currently enrolled in IOP program at [facility]; Pt drives and is employed /; pt cooks and performs household chores independently  Equipment Used at Home: none  Assistance upon Discharge: unknown; mother known to visit pt in hospital     Pain/Comfort:  Pain Rating 1: 7/10  Location - Side 1: Bilateral  Location - Orientation 1: generalized (gildardo low back and LE's)  Location 1:  (generalized)  Pain Addressed 1: Nurse notified, Distraction  Pain Rating Post-Intervention 1: 10/10    Patients cultural, spiritual, Nondenominational conflicts given the current situation: no    Objective:     Communicated with: Nurse Mayers prior to session.  Patient found HOB elevated with telemetry, pulse ox (continuous), peripheral IV, oxygen, Other (comments) (central line) upon OT entry to room.    General Precautions: Standard, fall, seizure  Orthopedic Precautions: N/A  Braces: N/A  Respiratory Status: High flow, flow 15 L/min, concentration 62%    VITALS  Presession   Post session  /67    O2 96%   90%    Occupational Performance:    Bed Mobility:    Patient completed Supine to Sit with stand by assistance  Patient completed Sit to Supine with stand by assistance    Functional Mobility/Transfers:  Patient completed Sit <> Stand Transfer with minimum assistance  with  hand-held assist and from EOB and CGA from BSC    Patient completed Toilet Transfer Stand Pivot  technique with minimum assistance with  hand-held assist and bedside commode  Functional Mobility: min A and hand held assist ambulate ~ 6 feet  x 2  from bed to and from lavatory     Activities of Daily Living:  Grooming: contact guard assistance standing at sink for brushing teeth , washing face and hands   Upper Body Dressing: stand by assistance with hospital gown from EOB  Lower Body Dressing: Pt with difficulty reaching toward feet due to swelling B LE and scrotum impacting ROM and causing pain with attempt ; pt required max A threading underwear and shorts over feet to knees and CGA standing phase to pull over hips; max A don socks while EOB impacted by LE swelling and back pain with attempt    Toileting: independent at bedside with use of urinal; per pt report, assist with hygiene after BM impacted by  swelling and pain with attempt       Cognitive/Visual Perceptual:  Cognitive/Psychosocial Skills:  -       Oriented to: Person, Place, Time, and Situation   -       Follows Commands/attention:Follows multistep  commands    Physical Exam:  Edema:  B LE 's impacting performance in basic self care tasks   Sensation:    -       Intact  light/touch B UE and hands  Dominant hand: -       right  Upper Extremity Range of Motion:     -       Right Upper Extremity: WNL  -       Left Upper Extremity: WNL  Upper Extremity Strength:    -       Right Upper Extremity: WFL  -       Left Upper Extremity: WFL   Strength:    -       Right Upper Extremity: WFL  -       Left Upper Extremity: WFL  Fine Motor Coordination:    -       Intact  Left hand thumb/finger opposition skills, Right hand thumb/finger opposition skills, Left hand, manipulation of objects, and Right hand, manipulation of objects    Treatment & Education:  Pt. educated on OT goals, POC, orientation to environment, use of call bell for assist with transfers OOB or for any other needs due to fall risk.     Pt educated on modified techniques for LB dressing ;  needs reenforcement for max performance.     Patient left HOB elevated with all lines intact, call button in reach, and nurse  notified    GOALS:   Multidisciplinary Problems       Occupational Therapy Goals          Problem: Occupational Therapy    Goal Priority Disciplines Outcome Interventions   Occupational Therapy Goal     OT, PT/OT Ongoing, Progressing    Description: Goals to be met by: d/c     Patient will increase functional independence with ADLs by performing:    LE Dressing with Modified [place].    Grooming while standing at sink with [place].    Toileting from toilet with Modified [place] for hygiene and clothing management.     Toilet transfer to toilet with Modified [place].                         History:     Past Medical History:   Diagnosis Date    Depression     Opioid abuse     Seizures     Solitary kidney, congenital     Unspecified viral hepatitis C without hepatic coma          Past Surgical History:   Procedure Laterality Date    TONSILLECTOMY         Time Tracking:     OT Date of Treatment: 06/12/23  OT Start Time: 0858  OT Stop Time: 0940  OT Total Time (min): 42 min    Billable Minutes:Evaluation 30 min  Self Care/Home Management 12 min     6/12/2023

## 2023-06-13 PROBLEM — R65.21 SEPTIC SHOCK: Status: ACTIVE | Noted: 2023-06-13

## 2023-06-13 PROBLEM — E87.20 METABOLIC ACIDOSIS: Status: ACTIVE | Noted: 2023-06-13

## 2023-06-13 PROBLEM — A41.9 SEPTIC SHOCK: Status: ACTIVE | Noted: 2023-06-13

## 2023-06-13 PROBLEM — N17.9 ACUTE RENAL FAILURE: Status: ACTIVE | Noted: 2023-06-13

## 2023-06-13 LAB
ALBUMIN SERPL-MCNC: 2 G/DL (ref 3.5–5)
ALBUMIN/GLOB SERPL: 0.6 RATIO (ref 1.1–2)
ALP SERPL-CCNC: 41 UNIT/L (ref 40–150)
ALT SERPL-CCNC: 118 UNIT/L (ref 0–55)
AST SERPL-CCNC: 61 UNIT/L (ref 5–34)
BASOPHILS # BLD AUTO: 0.06 X10(3)/MCL
BASOPHILS NFR BLD AUTO: 0.3 %
BILIRUBIN DIRECT+TOT PNL SERPL-MCNC: 0.3 MG/DL
BUN SERPL-MCNC: 36.6 MG/DL (ref 8.9–20.6)
CALCIUM SERPL-MCNC: 8.2 MG/DL (ref 8.4–10.2)
CHLORIDE SERPL-SCNC: 104 MMOL/L (ref 98–107)
CO2 SERPL-SCNC: 26 MMOL/L (ref 22–29)
CREAT SERPL-MCNC: 3.56 MG/DL (ref 0.73–1.18)
EOSINOPHIL # BLD AUTO: 0.09 X10(3)/MCL (ref 0–0.9)
EOSINOPHIL NFR BLD AUTO: 0.4 %
ERYTHROCYTE [DISTWIDTH] IN BLOOD BY AUTOMATED COUNT: 13 % (ref 11.5–17)
GFR SERPLBLD CREATININE-BSD FMLA CKD-EPI: 22 MLS/MIN/1.73/M2
GLOBULIN SER-MCNC: 3.5 GM/DL (ref 2.4–3.5)
GLUCOSE SERPL-MCNC: 111 MG/DL (ref 74–100)
HCT VFR BLD AUTO: 31 % (ref 42–52)
HGB BLD-MCNC: 10.2 G/DL (ref 14–18)
IMM GRANULOCYTES # BLD AUTO: 0.56 X10(3)/MCL (ref 0–0.04)
IMM GRANULOCYTES NFR BLD AUTO: 2.5 %
LYMPHOCYTES # BLD AUTO: 1.7 X10(3)/MCL (ref 0.6–4.6)
LYMPHOCYTES NFR BLD AUTO: 7.5 %
MAGNESIUM SERPL-MCNC: 2 MG/DL (ref 1.6–2.6)
MCH RBC QN AUTO: 29.5 PG (ref 27–31)
MCHC RBC AUTO-ENTMCNC: 32.9 G/DL (ref 33–36)
MCV RBC AUTO: 89.6 FL (ref 80–94)
MONOCYTES # BLD AUTO: 2.59 X10(3)/MCL (ref 0.1–1.3)
MONOCYTES NFR BLD AUTO: 11.4 %
NEUTROPHILS # BLD AUTO: 17.69 X10(3)/MCL (ref 2.1–9.2)
NEUTROPHILS NFR BLD AUTO: 77.9 %
NRBC BLD AUTO-RTO: 0 %
PHOSPHATE SERPL-MCNC: 5 MG/DL (ref 2.3–4.7)
PLATELET # BLD AUTO: 374 X10(3)/MCL (ref 130–400)
PMV BLD AUTO: 9.2 FL (ref 7.4–10.4)
POTASSIUM SERPL-SCNC: 4 MMOL/L (ref 3.5–5.1)
PROT SERPL-MCNC: 5.5 GM/DL (ref 6.4–8.3)
RBC # BLD AUTO: 3.46 X10(6)/MCL (ref 4.7–6.1)
SODIUM SERPL-SCNC: 141 MMOL/L (ref 136–145)
WBC # SPEC AUTO: 22.69 X10(3)/MCL (ref 4.5–11.5)

## 2023-06-13 PROCEDURE — 83735 ASSAY OF MAGNESIUM: CPT

## 2023-06-13 PROCEDURE — 27100171 HC OXYGEN HIGH FLOW UP TO 24 HOURS

## 2023-06-13 PROCEDURE — C1752 CATH,HEMODIALYSIS,SHORT-TERM: HCPCS

## 2023-06-13 PROCEDURE — 25000003 PHARM REV CODE 250: Performed by: STUDENT IN AN ORGANIZED HEALTH CARE EDUCATION/TRAINING PROGRAM

## 2023-06-13 PROCEDURE — 63600175 PHARM REV CODE 636 W HCPCS: Performed by: FAMILY MEDICINE

## 2023-06-13 PROCEDURE — 94761 N-INVAS EAR/PLS OXIMETRY MLT: CPT

## 2023-06-13 PROCEDURE — 85025 COMPLETE CBC W/AUTO DIFF WBC: CPT | Performed by: STUDENT IN AN ORGANIZED HEALTH CARE EDUCATION/TRAINING PROGRAM

## 2023-06-13 PROCEDURE — 21400001 HC TELEMETRY ROOM

## 2023-06-13 PROCEDURE — 80053 COMPREHEN METABOLIC PANEL: CPT

## 2023-06-13 PROCEDURE — 94799 UNLISTED PULMONARY SVC/PX: CPT

## 2023-06-13 PROCEDURE — 97116 GAIT TRAINING THERAPY: CPT

## 2023-06-13 PROCEDURE — 25000003 PHARM REV CODE 250: Performed by: FAMILY MEDICINE

## 2023-06-13 PROCEDURE — 84100 ASSAY OF PHOSPHORUS: CPT

## 2023-06-13 PROCEDURE — 63600175 PHARM REV CODE 636 W HCPCS: Performed by: STUDENT IN AN ORGANIZED HEALTH CARE EDUCATION/TRAINING PROGRAM

## 2023-06-13 RX ORDER — HEPARIN SODIUM 5000 [USP'U]/ML
5000 INJECTION, SOLUTION INTRAVENOUS; SUBCUTANEOUS EVERY 8 HOURS
Status: DISCONTINUED | OUTPATIENT
Start: 2023-06-14 | End: 2023-06-16 | Stop reason: HOSPADM

## 2023-06-13 RX ORDER — HEPARIN SODIUM,PORCINE/D5W 25000/250
0-40 INTRAVENOUS SOLUTION INTRAVENOUS CONTINUOUS
Status: DISCONTINUED | OUTPATIENT
Start: 2023-06-13 | End: 2023-06-13

## 2023-06-13 RX ADMIN — HYDROMORPHONE HYDROCHLORIDE 0.5 MG: 1 INJECTION, SOLUTION INTRAMUSCULAR; INTRAVENOUS; SUBCUTANEOUS at 12:06

## 2023-06-13 RX ADMIN — HYDROCODONE BITARTRATE AND ACETAMINOPHEN 1 TABLET: 10; 325 TABLET ORAL at 02:06

## 2023-06-13 RX ADMIN — POLYETHYLENE GLYCOL 3350 17 G: 17 POWDER, FOR SOLUTION ORAL at 08:06

## 2023-06-13 RX ADMIN — HYDROMORPHONE HYDROCHLORIDE 0.5 MG: 1 INJECTION, SOLUTION INTRAMUSCULAR; INTRAVENOUS; SUBCUTANEOUS at 09:06

## 2023-06-13 RX ADMIN — LIDOCAINE PATCH 5% 1 PATCH: 700 PATCH TOPICAL at 04:06

## 2023-06-13 RX ADMIN — HEPARIN SODIUM 5000 UNITS: 5000 INJECTION, SOLUTION INTRAVENOUS; SUBCUTANEOUS at 08:06

## 2023-06-13 RX ADMIN — GABAPENTIN 300 MG: 300 CAPSULE ORAL at 08:06

## 2023-06-13 RX ADMIN — HYDROMORPHONE HYDROCHLORIDE 0.5 MG: 1 INJECTION, SOLUTION INTRAMUSCULAR; INTRAVENOUS; SUBCUTANEOUS at 03:06

## 2023-06-13 RX ADMIN — LEVETIRACETAM 500 MG: 500 TABLET, FILM COATED ORAL at 08:06

## 2023-06-13 RX ADMIN — HYDROMORPHONE HYDROCHLORIDE 0.5 MG: 1 INJECTION, SOLUTION INTRAMUSCULAR; INTRAVENOUS; SUBCUTANEOUS at 06:06

## 2023-06-13 RX ADMIN — TAMSULOSIN HYDROCHLORIDE 0.4 MG: 0.4 CAPSULE ORAL at 08:06

## 2023-06-13 RX ADMIN — GABAPENTIN 300 MG: 300 CAPSULE ORAL at 02:06

## 2023-06-13 RX ADMIN — HYDROCODONE BITARTRATE AND ACETAMINOPHEN 1 TABLET: 10; 325 TABLET ORAL at 01:06

## 2023-06-13 RX ADMIN — CALCIUM CARBONATE 500 MG: 500 TABLET, CHEWABLE ORAL at 06:06

## 2023-06-13 RX ADMIN — HYDROCODONE BITARTRATE AND ACETAMINOPHEN 1 TABLET: 10; 325 TABLET ORAL at 08:06

## 2023-06-13 NOTE — PROGRESS NOTES
[facility] - Hospital Medicine    Progress Note      Patient Name: Ryan Wild  MRN: [id number]  Admission Date: 6/2/2023  Attending Physician: Naida Egan MD   Primary Care Provider: Adilene Dillon NP    Patient information was obtained from patient and ER records.     Subjective:     Principal Problem:Non-traumatic rhabdomyolysis    Chief Complaint:   Chief Complaint   Patient presents with    Back Pain    Hypotension    Hearing Problem     PT REPORTS WAKING UP W LOWER BACK PAIN, WEAKNESS, SOB AND HEARING LOSS. 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  DENIES DRUG USE. HX OF SEIZURES, NON COMPLIANT W MEDS > 1 MONTH.  .  EKG OBTAINED.         HPI: Ryan Wild is a 32 y.o. male with history of seizures (off Keppra), drug use, hepatitis C (treated), and solitary kidney who presented to the ED on 6/2/2023  with a primary complaint of back pain. Patient is a  who had worked a 12 hour shift out in the sun the day prior. States that he went to bed feeling great, without any complaints. Then he woke up late for work day of admission and admits to not remembering much of what happened afterwards. His coworkers brought him to the ED. Admits to severe back pain that is aggravated by movement, weakness, shortness of breath. Also has not urinated all day. Denies chest pain, palpitations, headache, nausea, vomiting, abdominal pain, fevers. Last IV drug use 1 year ago, but snorted meth 2 days ago. Also uses IM steroids, last injection last night. Has not taken his home Keppra for the past few months; his last seizure was on 8/2022.      In the ED, patient presented hypotensive, tachycardic, SpO2 89% on RA. He was placed on bipap. Vasopressors were started when patient continued to be hypotensive with IVF. Labs significant for WBC 42.1, K 6.9, CO2 14, Cr 3.77, , . CPK 37k. LDH 2,2k. CBG normal. Troponin 1.878,  EKG no ischemic changes. Lactic 8.0. CXR  with increased interstitial markings in the right greater than left lung. Bedside Echo no abnormalities. Pt was given Vanc/Zosyn x1, and 4L bolus NS total. Medicine was called to admit patient for septic shock with multiorgan dysfunction.    Hospital course:  06/03/2023: Patient started on HD emergently, Levophed, Precedex, BiPAP, good response to Lasix  06/04/2023:  Recommend repeat HD today, nephro to see, off Levophed, continue Precedex, BiPAP, consider proning after dialysis, if no dialysis planned we will give repeat dose of Lasix.  6.5.23: No acute events overnight, patient remains on Precedex at max. Concerned about his status and why he is still in-house. Unable to tolerate vapotherm or proning for any appreciable amount of time. Currently being dialyzed.   6.6.23: NAEON. HD yesterday. Removed 0.5-1L ultrafiltrate. Well tolerated.  Able to wean off BIPAP to CPAP. HFNC at supper time and able to tolerate small meal. Back on CPAP at night. Still requiring sedation with Precedex. Currently on Vapotherm at 100% O2.  6.7.23: Desatted to low 79-80s on vapotherm. Initially refused to be placed back CPAP. However, was reasonable to be return to it after speaking with ICU nursing staff. Has been irritable and still reports he is in pain. Received Morphine x1. Still on Precedex 1.4mcg. Appears untouched. Has Net positive fluid balance.   6.8.23: Dialyzed yesterday morning. Removed approx 1.5L. Had an additional 850mL in urinary output. Tolerated Vapotherm. O2 sats remained in <90s.   CK improved prior to dialyzing. He still reports significant back pain requiring scheduled dilaudid. No longer on Precedex.   Has an appetite, but not eating much.   6.9.23: Downgraded to tele. Feels his breathing is better, however is more swollen than normal. Complaints of low back and bilateral limb pain. Requesting Dilaudid every three hours. Appetite improving. Urinating, however states he  doesn't know when happening.  6.10.23: NAEO.  Tolerating HFNC.  Patient continues to report whole-body pain which is worse in his back.  His oxygen requirements have not significantly decreased.  We will have the patient work with PT/OT.  CK continues to downtrend, does have persistent leukocytosis.  Cultures have grown nothing.  He has received 8 days of pneumonia coverage.  6.11.23: NAEON. VSS. Received HD one day ago. >2L UF removed. Still feeling swollen. Oxygen requirements have not decreased significantly. Patient participated, but did not tolerate PT due to low back and leg pain. Reports pain not well controlled with Dilaudid and Norco 10. Appetite improving.  6.12.23: NAEON. VSS. Saturating well on Vapotherm. Still with visible swelling to upper extremities and penis. Lower extremities have improved. Back pain with some improvement.  Swelling also improved.  Added Robaxin (renal dose) to pain regimen. No other issues or concerns at this time.    Interval history:  NAEON. VSS. Off oxygen. Patient eating and drinking without difficulty. Pain still significant in lower back. Currently 5/10. Fentanyl patch helping. Weaning pain medications. He states that he would like to be discharged to outpatient PT and to [place] for Rehab once ready. Understands plan for outpatient HD. Tunnel cath planned for this Friday 6.16.23      Past Medical History:   Diagnosis Date    Depression     Opioid abuse     Seizures     Solitary kidney, congenital     Unspecified viral hepatitis C without hepatic coma        Past Surgical History:   Procedure Laterality Date    TONSILLECTOMY         Review of patient's allergies indicates:  No Known Allergies    No current facility-administered medications on file prior to encounter.     Current Outpatient Medications on File Prior to Encounter   Medication Sig    albuterol (PROVENTIL/VENTOLIN HFA) 90 mcg/actuation inhaler INHALE 2 PUFFS BY MOUTH EVERY 6 HOURS AS NEEDED FOR SHORTNESS OF  BREATH OR WHEEZING    fluticasone propionate (FLONASE) 50 mcg/actuation nasal spray SHAKE LIQUID AND USE 2 SPRAYS IN EACH NOSTRIL DAILY    levETIRAcetam (KEPPRA) 500 MG Tab Take 1 tablet by mouth 2 (two) times daily.    QUEtiapine (SEROQUEL) 100 MG Tab Take 1 tablet (100 mg total) by mouth nightly.    valACYclovir (VALTREX) 1000 MG tablet Take 1,000 mg by mouth 3 (three) times daily.    venlafaxine (EFFEXOR-XR) 37.5 MG 24 hr capsule Take 37.5 mg by mouth every morning.     Family History       Problem Relation (Age of Onset)    Cerebral aneurysm Father          Tobacco Use    Smoking status: Every Day     Types: Vaping with nicotine    Smokeless tobacco: Current   Substance and Sexual Activity    Alcohol use: Not Currently    Drug use: Not Currently     Types: Heroin, Methamphetamines     Comment: 3 years sober    Sexual activity: Yes     Partners: Female     Review of Systems   Constitutional:  Negative for chills and fever.   Gastrointestinal:  Negative for abdominal pain, nausea and vomiting.   Genitourinary:  Positive for penile pain and penile swelling. Negative for difficulty urinating, dysuria and scrotal swelling.   Musculoskeletal:  Positive for back pain.   Neurological:  Positive for weakness. Negative for headaches.   Psychiatric/Behavioral:  Negative for hallucinations. The patient is not nervous/anxious.    Objective:     Vital Signs (Most Recent):  Temp: 98.3 °F (36.8 °C) (06/13/23 1142)  Pulse: 100 (06/13/23 1142)  Resp: 20 (06/13/23 1231)  BP: (!) 167/97 (06/13/23 1142)  SpO2: (!) 91 % (06/13/23 1142) Vital Signs (24h Range):  Temp:  [98.2 °F (36.8 °C)-99.1 °F (37.3 °C)] 98.3 °F (36.8 °C)  Pulse:  [] 100  Resp:  [18-20] 20  SpO2:  [89 %-98 %] 91 %  BP: (141-167)/(84-97) 167/97     Weight: 69.4 kg (153 lb)  Body mass index is 23.96 kg/m².    Physical Exam  Constitutional:       General: He is awake. He is not in acute distress.     Appearance: Normal appearance. He is normal weight. He is not  ill-appearing or diaphoretic.      Comments: Lying in bed on Vapotherm. Awake and alert.   Eyes:      Extraocular Movements: Extraocular movements intact.      Conjunctiva/sclera: Conjunctivae normal.   Cardiovascular:      Rate and Rhythm: Normal rate and regular rhythm.      Pulses: Normal pulses.           Dorsalis pedis pulses are 2+ on the right side and 2+ on the left side.      Heart sounds: Normal heart sounds.      Arteriovenous access: Right and left arteriovenous access is present.  Pulmonary:      Effort: Pulmonary effort is normal.      Breath sounds: Normal air entry. No wheezing.   Chest:      Chest wall: No tenderness.   Abdominal:      General: Abdomen is flat. Bowel sounds are normal.      Palpations: Abdomen is soft.      Tenderness: There is no abdominal tenderness. There is no guarding.      Comments: Taut abdomen.    Genitourinary:     Testes: Normal.   Musculoskeletal:         General: Tenderness present. Normal range of motion.      Lumbar back: Deformity and tenderness present. No signs of trauma or lacerations.      Right knee: Normal.      Left knee: Normal.      Right lower leg: Tenderness present. No edema.      Left lower leg: Tenderness present. No edema.      Right foot: Swelling present. No deformity.      Left foot: Swelling present.      Comments: Bilateral pitting edema of the lower extremities. BUE swollen, non tender.   Feet:      Right foot:      Skin integrity: Warmth present.      Left foot:      Skin integrity: Skin integrity normal.   Skin:     General: Skin is warm.      Capillary Refill: Capillary refill takes 2 to 3 seconds.      Coloration: Skin is not cyanotic, jaundiced or mottled.   Neurological:      Mental Status: He is alert and oriented to person, place, and time.      GCS: GCS eye subscore is 4. GCS verbal subscore is 5. GCS motor subscore is 6.   Psychiatric:         Mood and Affect: Mood normal.         Behavior: Behavior is cooperative.          Significant  Labs: All pertinent labs within the past 24 hours have been reviewed.     Latest Reference Range & Units 06/12/23 03:19   WBC 4.50 - 11.50 x10(3)/mcL 20.01 (H)   RBC 4.70 - 6.10 x10(6)/mcL 3.61 (L)   Hemoglobin 14.0 - 18.0 g/dL 10.6 (L)   Hematocrit 42.0 - 52.0 % 31.8 (L)   MCV 80.0 - 94.0 fL 88.1   MCH 27.0 - 31.0 pg 29.4   MCHC 33.0 - 36.0 g/dL 33.3   RDW 11.5 - 17.0 % 12.9   Platelets 130 - 400 x10(3)/mcL 365   MPV 7.4 - 10.4 fL 9.7   Neut % % 80.0   LYMPH % % 7.2   Mono % % 11.0   Eosinophil % % 0.3   Basophil % % 0.1   Immature Granulocytes % 1.4   Neut # 2.1 - 9.2 x10(3)/mcL 15.97 (H)   Lymph # 0.6 - 4.6 x10(3)/mcL 1.45   Mono # 0.1 - 1.3 x10(3)/mcL 2.21 (H)   Eos # 0 - 0.9 x10(3)/mcL 0.06   Baso # <=0.2 x10(3)/mcL 0.03   Immature Grans (Abs) 0 - 0.04 x10(3)/mcL 0.29 (H)   nRBC % 0.0   Sodium 136 - 145 mmol/L 139   Potassium 3.5 - 5.1 mmol/L 4.2   Chloride 98 - 107 mmol/L 105   CO2 22 - 29 mmol/L 23   BUN 8.9 - 20.6 mg/dL 41.9 (H)   Creatinine 0.73 - 1.18 mg/dL 4.30 (H)   eGFR mls/min/1.73/m2 18   Glucose 74 - 100 mg/dL 107 (H)   Calcium 8.4 - 10.2 mg/dL 8.2 (L)   Phosphorus 2.3 - 4.7 mg/dL 4.7   Magnesium 1.60 - 2.60 mg/dL 2.00   Alkaline Phosphatase 40 - 150 unit/L 41   PROTEIN TOTAL 6.4 - 8.3 gm/dL 5.3 (L)   Albumin 3.5 - 5.0 g/dL 1.8 (L)   Albumin/Globulin Ratio 1.1 - 2.0 ratio 0.5 (L)   BILIRUBIN TOTAL <=1.5 mg/dL 0.3   AST 5 - 34 unit/L 60 (H)   ALT 0 - 55 unit/L 117 (H)   Globulin, Total 2.4 - 3.5 gm/dL 3.5   CPK 30 - 200 U/L 459 (H)   (H): Data is abnormally high  (L): Data is abnormally low     Latest Reference Range & Units 06/12/23 04:34   POC PH 7.350 - 7.450  7.440   POC PO2 75.0 - 100.0 mmHg 102.0 (H)   POC HCO3 22.0 - 26.0 mmol/L 29.2 (H)   Sample site  Right Radial Artery   Drawn by  AJ   pCO2, Blood gas 35.0 - 45.0 mmHg 43.0   THb, Blood gas 12 - 18 g/dL 10.2 (L)   sO2, Blood gas >=90.0 % 99.5   O2 Hb, Blood Gas 94.0 - 100.0 % 96.5   TOC2, Blood gas 22.0 - 26.0 mmol/L 30.5 (H)   pAO2 mmHg 481    A-aDO2 mmHg 379   FIO2, Blood gas % 75.0   LPM  13.0   Allens Test  Yes   Oxygen Device, Blood gas  High Flow Cannula   (H): Data is abnormally high  (L): Data is abnormally low    Significant Imaging: I have reviewed all pertinent imaging results/findings within the past 24 hours.    CXR 6.12.23 My read:  Portable chest x-ray.  Patient appears rotated to the right slightly.  No bony deformities or fractures appreciated.  Cardiac silhouette appears to be normal but partially obscured inferiorly.  Bilateral consolidations appear improved, with less focal density and less his at the right lung base.    Assessment/Plan:   Hypoxia- Improving  -Currently off supplemental O2.   -Monitoring saturations    Acute renal failure secondary to rhabdo secondary to above- Improving  Fluid overloaded  HTN 2/2 above  -Continues to improve. CK downtrending. Nephrology following.   -HD sessions per [facility] schedule while inpatient. -No diuresis while receiving HD, per Nephrology.  -Tunnel Cath scheduled for Friday 6.16.23    Leukocytosis  Sepsis secondary to above- Resolved  Shock 2/2 above - Resolved  -Unclear etiology.  -Completed Day 5 prednisone one day ago. Expect WBCs to decrease. Continue to monitor.  -Has remained afebrile.  -Ordered repeat blood cultures prior to HD Cath insertion.  -completed course of abx  -chest x-ray improving    Low back pain  - PT/OT following. Patient motivated and wants to participate.  - now on multimodal pain control. Weaning Dilaudid.    Hyperkalemia- Resolved with dialysis    Intracardiac shunting  NSTEMI type 2, however given high peak concern for type 1  -Echo 50%EF (normal). Positive for intracardiac shunt. Cardiology following. Possible EUGENIA once hemodynamically stable.   - Discontinued heparin gtt.    Seizure disorder  - Continue Keppra 500mg po BID    History of polysubstance use disorder  History of anabolic steroid abuse  - Case management assisting with  rehab/substance abuse  assistance.        CODE STATUS: FULL   Access: HD line, art line, PIV  Antibiotics: None  Diet: Renal on Dialysis  DVT Prophylaxis: Heparin 5K units BID.  Fluids: None.    GI Prophylaxis: Compazine PRN.  Oxygen requirements: None.  Pain: IV Dilaudid 0.5mg q3hPRN; PO Norco 10 q6hPRN, Robaxin 500 mg t.i.d. (renal dose), fentanyl patch 50 mcg p.r.n..  Sedation: None        Disposition: Continue to de-escalate pain medications. Continue Oxygen status monitoring. HD per nephrology recs.     Dillon Dominguez MD  Department of Hospital Medicine

## 2023-06-13 NOTE — PT/OT/SLP PROGRESS
Physical Therapy    Missed Treatment Session    Patient Name:  Ryan Wild   MRN:  [id number]      -patient not seen at this time secondary to patient unwilling to participate  -per patient - I'm in pain...waiting for pain medication...come back in maybe 15 minutes  -will follow-up as patient is available to participate and as therapists' schedule allows.

## 2023-06-13 NOTE — PROGRESS NOTES
[facility]  Nephrology Progress Note    Patient Name: Ryan Wild  Age: 32 y.o.  : 1990  MRN: [id number]  Admission Date: 2023    Chief complaint: Back Pain, Hypotension, and Hearing Problem (PT REPORTS WAKING UP W LOWER BACK PAIN, WEAKNESS, SOB AND HEARING LOSS. 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  DENIES DRUG USE. HX OF SEIZURES, NON COMPLIANT W MEDS > 1 MONTH.  .  EKG OBTAINED. )      Hospital course  Ryan Wild is a 32 y.o. White male with past medical history of seizures, polysubstance abuse, treated hepatitis-C, and solitary kidney.  Patient presented to the emergency department on 2023 with complaints of back pain, shortness breath, and decreased urinary frequency.  Patient believes that symptoms were precipitated by the fact that he has been working outside in the heat for 12 hours. He admitted to polysubstance abuse, including intravenous drug use, and anabolic steroid use.  UDS was positive for methamphetamines and fentanyl. In the emergency department, patient was tachycardic, hypotensive, and hypoxemic.  Laboratory results were remarkable for severe leukocytosis (WBC 41), azotemia, metabolic acidosis, hyperkalemia (serum potassium 6.9), transaminitis, hyperphosphatemia, elevated troponin, and elevated lactic acid level.  CK was 37,420 units per L.  Patient was admitted to the intensive care unit, placed on BiPAP, and emergently dialyzed for correction of life-threatening hyperkalemia on 2023.  Nephrology is continuing to follow for assistance with management of acute kidney injury and multiple electrolyte/acid-base abnormalities    Subjective  Patient is resting in bed. On 55% FiO2 at 8L via Vapotherm, improved from yesterday.  Appears discouraged from swelling/weight gain.  Underwent dialysis yesterday with 2 L of ultrafiltration.    Review of Systems  Negative except as stated  "above    Objective  BP (!) 151/88   Pulse 81   Temp 98.2 °F (36.8 °C) (Oral)   Resp 20   Ht 5' 7" (1.702 m)   Wt 69.4 kg (153 lb)   SpO2 96%   BMI 23.96 kg/m²     Intake/Output Summary (Last 24 hours) at 6/13/2023 0957  Last data filed at 6/12/2023 1943  Gross per 24 hour   Intake 1120 ml   Output 2900 ml   Net -1780 ml       Physical Exam  General appearance: Patient is in no acute distress. On Vapotherm  HEENT: EOMI, no JVD. Neck is supple.  Right IJ dialysis catheter  Chest:   Lung sounds are diminished to auscultation, improved from yesterday.    Heart: S1, S2.   Abdomen:  Soft, nondistended.  :  Deferred  Extremities: generalized upper and lower extremity edema, peripheral pulses are palpable.   Neuro: No focal deficits.  Alert and oriented.    Medications    Current Facility-Administered Medications:     bisacodyL suppository 10 mg, 10 mg, Rectal, Daily PRN, Dominic Armijo MD    calcium carbonate 200 mg calcium (500 mg) chewable tablet 500 mg, 500 mg, Oral, Daily PRN, Dillon Dominguez MD, 500 mg at 06/13/23 0648    calcium gluconate 1 g in NS IVPB (premixed), 1 g, Intravenous, PRN, Lm Yanes MD, Stopped at 06/04/23 0346    calcium gluconate 1 g in NS IVPB (premixed), 2 g, Intravenous, PRN, Lm Yanes MD    calcium gluconate 1 g in NS IVPB (premixed), 3 g, Intravenous, PRN, Lm Yanes MD    dextrose 10% bolus 125 mL 125 mL, 12.5 g, Intravenous, PRN, Alda Valladares MD, Stopped at 06/02/23 2234    dextrose 40 % gel 15,000 mg, 15 g, Oral, PRN, Alda Valladares MD    dextrose 40 % gel 30,000 mg, 30 g, Oral, PRN, Alda Valladares MD    fentaNYL 50 mcg/hr 1 patch, 1 patch, Transdermal, Q72H, Dillon Dominguez MD, 1 patch at 06/12/23 0828    gabapentin capsule 300 mg, 300 mg, Oral, TID, Dillon Dominguez MD, 300 mg at 06/13/23 0831    glucagon (human recombinant) injection 1 mg, 1 mg, Intramuscular, PRN, Alda Valladares MD    heparin (porcine) injection 5,000 Units, 5,000 Units, Subcutaneous, Q12H, Dominic" MD Zofia, 5,000 Units at 06/13/23 0829    HYDROcodone-acetaminophen  mg per tablet 1 tablet, 1 tablet, Oral, Q6H PRN, Dillon Dominguez MD, 1 tablet at 06/13/23 0831    HYDROmorphone injection 0.5 mg, 0.5 mg, Intravenous, Q3H PRN, Dillon Dominguez MD, 0.5 mg at 06/13/23 0935    insulin regular injection 6.94 Units 0.0694 mL, 0.1 Units/kg, Intravenous, PRN, Dominic Armijo MD, 6.94 Units at 06/02/23 2112    levETIRAcetam tablet 500 mg, 500 mg, Oral, BID, Dillon Dominguez MD, 500 mg at 06/13/23 0830    LIDOcaine 5 % patch 1 patch, 1 patch, Transdermal, Q24H, Dillon Dominguez MD, 1 patch at 06/10/23 1651    melatonin tablet 9 mg, 9 mg, Oral, Nightly PRN, Adolfo Turcios DO, 9 mg at 06/11/23 0211    methocarbamoL tablet 500 mg, 500 mg, Oral, TID PRN, Dillon Dominguez MD, 500 mg at 06/12/23 1836    naloxone 0.4 mg/mL injection 0.02 mg, 0.02 mg, Intravenous, PRN, Alda Valladares MD    polyethylene glycol packet 17 g, 17 g, Oral, BID, Dominic Armijo MD, 17 g at 06/12/23 2023    prochlorperazine tablet 10 mg, 10 mg, Oral, TID PRN, Dillon Dominguez MD, 10 mg at 06/08/23 0813    QUEtiapine tablet 100 mg, 100 mg, Oral, Daily, Dillon Dominguez MD, 100 mg at 06/12/23 0827    sodium chloride 0.9% flush 10 mL, 10 mL, Intravenous, Q12H PRN, Alda Valladares MD    tamsulosin 24 hr capsule 0.4 mg, 0.4 mg, Oral, Daily, Dillon Dominguez MD, 0.4 mg at 06/13/23 0830     Imaging:    X-Ray Chest 1 View   Final Result      Some improvement in the changes of pulmonary vascular congestion and cardiac decompensation.      No other significant change         Electronically signed by: Emmanuel Waldron   Date:    06/12/2023   Time:    08:49      CT Cervical Spine Without Contrast   Final Result      CT Chest Abdomen Pelvis W W/O Contrast (XPD)   Final Result      X-Ray Chest 1 View   Final Result      Persistent pulmonary edema with slight improvement.         Electronically signed by: Eliazar Gibson MD   Date:    06/10/2023   Time:    10:52      X-Ray Chest 1 View    Final Result      Worsening consolidative changes more so on the left than on the right with some worsening also in the right upper lobe.      No other change         Electronically signed by: Emmanuel Waldron   Date:    06/08/2023   Time:    09:25      X-Ray Chest 1 View   Final Result   .   No significant interval change.         Electronically signed by: Adi Amato   Date:    06/07/2023   Time:    07:26      X-Ray Chest 1 View   Final Result      No significant interval change.         Electronically signed by: Ruiz Elam   Date:    06/06/2023   Time:    06:44      X-Ray Chest 1 View   Final Result      No adverse interval change.  Slight interval improvement.         Electronically signed by: Franco Gonzáles   Date:    06/05/2023   Time:    06:43      X-Ray Chest 1 View   Final Result      Unchanged multifocal right greater than left airspace opacities most consistent with multifocal pneumonia.         Electronically signed by: Eliazar Ronquillo MD   Date:    06/03/2023   Time:    18:58      X-Ray Chest 1 View   Final Result      As above.         Electronically signed by: Franco Gonzáles   Date:    06/03/2023   Time:    11:48      X-Ray Chest 1 View   Final Result      Interval placement of dialysis catheter with concern for developing right-sided effusion.  Hemothorax is not entirely excluded.         Electronically signed by: Eliazar Gibson MD   Date:    06/02/2023   Time:    22:26      CT Chest Abdomen Pelvis Without Contrast (XPD)   Final Result      1. Multifocal lung consolidation, likely pneumonia.   2. Small volume ascites with nonspecific gallbladder wall thickening.  Some of the ascitic fluid in the pelvis appears complex.   3. Mild hepatomegaly.         Electronically signed by: Arley Cuevas   Date:    06/02/2023   Time:    18:40      US Retroperitoneal Limited   Final Result      X-Ray Chest AP Portable   Final Result      Findings concerning for right mid to lower lung infectious process.          Electronically signed by: Franco Gonzáles   Date:    06/02/2023   Time:    13:48            Laboratory Data:  Hematology  Lab Results   Component Value Date    WBC 22.69 (H) 06/13/2023    HGB 10.2 (L) 06/13/2023    HCT 31.0 (L) 06/13/2023     06/13/2023     06/13/2023    K 4.0 06/13/2023    CHLORIDE 104 06/13/2023    CO2 26 06/13/2023    BUN 36.6 (H) 06/13/2023    CREATININE 3.56 (H) 06/13/2023    EGFRNORACEVR 22 06/13/2023    GLUCOSE 111 (H) 06/13/2023    CALCIUM 8.2 (L) 06/13/2023    ALKPHOS 41 06/13/2023    LABPROT 5.5 (L) 06/13/2023    ALBUMIN 2.0 (L) 06/13/2023    AST 61 (H) 06/13/2023     (H) 06/13/2023    MG 2.00 06/13/2023    PHOS 5.0 (H) 06/13/2023     Lab Results   Component Value Date    FERRITIN 153.40 09/14/2022    LDH 2,294 (H) 06/02/2023       Lab Results   Component Value Date    HIV Nonreactive 09/14/2022    HEPBSURFAG Nonreactive 06/02/2023    HEPBSAB Nonreactive 09/14/2022    HEPBCAB Nonreactive 09/14/2022         Impression  Nonoliguric KINGSLEY, 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 and/or hypoperfusion  Solitary kidney  Rhabdomyolysis - resolved  Transaminitis, stable  Multifocal pneumonia/leukocytosis   Respiratory failure on Vapotherm  NSTEMI, undifferentiated  History of seizure disorder   Polysubstance abuse with amphetamine/fentanyl  Anabolic steroid use  Treated hepatitis-C    Plan  Will continue hemodialysis per Monday, Wednesday, Friday schedule while hospitalized   Plan for tunneled hemodialysis catheter insertion on Friday  Case management working towards LTAC and outpatient hemodialysis set up  Wean oxygen as tolerated   Continue supportive care    Nate Kramer MD, PGY-3   Nephrology

## 2023-06-13 NOTE — NURSING
06/12/23 1943   Post-Hemodialysis Assessment   Total UF (mL) 2000 mL   Post-Hemodialysis Comments tolerated hd well. vss throughout. cvc fx well and able to meet ordreed bfr. post bp 151/84 hr 104

## 2023-06-13 NOTE — PROGRESS NOTES
Provided pt with OP dialysis schedule at [facility] 12:30 pm to begin Friday, June 16. At pt's request, referral packet faxed to [facility] at [phone].for substance abuse tx.

## 2023-06-13 NOTE — PT/OT/SLP PROGRESS
Physical Therapy Treatment    Patient Name:  Ryan Wild   MRN:  [id number]    Recommendations     Discharge Recommendations:  home   Discharge Equipment Recommendations: oxygen   Barriers to discharge:  fall risk, severity of deficits, level of skilled assistance required, and decreased endurance    Assessment     Ryan Wild is a 32 y.o. male admitted with a medical diagnosis of Non-traumatic rhabdomyolysis.    Methamphetamine/fentanyl overdose  Acute renal failure secondary to rhabdo secondary to above- Improving  Whole-body pain  Low back pain  Complex abdominal fluid collection  Fluid overloaded  Hyperkalemia- Resolved with dialysis  HTN 2/2 above  Pneumonia-community-acquired with possible component of aspiration.  Leukocytosis  Sepsis secondary to above- Resolved  Shock 2/2 above - Resolved  Intracardiac shunting  NSTEMI type 2, however given high peak concern for type 1  Seizure disorder  History of polysubstance use disorder  History of anabolic steroid abuse         Patient Active Problem List   Diagnosis    Hepatitis C virus infection without hepatic coma    Other bipolar disorder    Opioid use disorder, moderate, in sustained remission    Amphetamine use disorder, severe, in sustained remission    KINGSLEY (acute kidney injury)    NSTEMI (non-ST elevated myocardial infarction)    Non-traumatic rhabdomyolysis    Shock    Solitary kidney, congenital    Hyperkalemia    Hypermagnesemia    Elevated levels of transaminase & lactic acid dehydrogenase    Seizure disorder     He presents with the following impairments/functional limitations:  impaired endurance, gait instability, impaired balance, decreased safety awareness.    Rehab Prognosis: Good.    Patient would benefit from continued skilled acute PT services to: address above listed impairments/functional limitations; receive patient/caregiver education; reduce fall risk; and maximize independency/safety with functional mobility.    -continued: ambulation,  with progression of gait distance/frequency/duration and speed, as tolerated/appropriate, with assistance and supervision, w/ O2NC    Recent Surgery: Procedure(s) (LRB):  Insertion, Catheter, Central Venous, Hemodialysis (N/A)      Plan     During this hospitalization, patient to be seen 3 x/week to address the identified impairments/functional limitations via gait training, therapeutic activities, therapeutic exercises and progress toward the established goals.    Plan of Care Expires:  07/08/23    Subjective      Communicated with patient's nurse Tameka prior to session.    Patient agreeable to participate in treatment session.    Chief Complaint: none  Patient/Family Comments/goals: home  Pain/Comfort:  Pain Rating 1: 0/10  Pain Addressed 1: Nurse notified  Pain Rating Post-Intervention 1: 0/10    Objective     Patient found supine in bed, with HOB elevated, and bed rails up right HOB with central line, telemetry, peripheral IV, oxygen (O2 NC 'on' at wall and lying on room floor)  upon PT entry to room.    General Precautions: Standard, fall, seizure, hearing impaired   Orthopedic Precautions:N/A   Braces: N/A  Respiratory Status: 2 liters/min O2 via nasal cannula (lying on floor of room)    Functional Mobility:    *O2NC w/ portable O2 tank for all activities w/ therapy    Bed Mobility:  Rolling Left: independence  Rolling Right: independence  Scooting: independence  Supine to Sit: independence  Sit to Supine: independence  with no cues required    Transfers:  Sit to Stand: modified independence with no assistive device  with no cues required    Gait:  Patient ambulated 130ft  Standing pause x2 minutes  Patient ambulated 260ft  Sitting rest x4 minutes  Patient ambulated 260ft  With no assistive device and modified independence.  Patient demonstrates symmetrical step length, no loss of balance, no mis-steps, decreased step length, wide base of support, and inconsistent bilateral foot placement.    Other  Mobility:  not assessed    Balance:  Sit  Patient demonstrated static balance on level surface with independence with no verbal cues.  Patient demonstrated dynamic balance on level surface with modified independence with no verbal cues during moderate excursions.  Stand  Patient demonstrated static balance on level surface  using rolling walker with modified independence with no verbal cues.    Patient left supine in bed, with HOB elevated, and bed rails up bilateral HOB with central line, telemetry, peripheral IV, oxygen-O2NC, all lines intact, call button in reach, tray table at bedside, patient's nurse Tameka notified, and patient removed O2NC and placed on bed surface (pt's nurse Tameka notified) .    Goals     Multidisciplinary Problems       Physical Therapy Goals       Problem: Physical Therapy    Goal Priority Disciplines Outcome Goal Variances Interventions   Physical Therapy Goal     PT, PT/OT Ongoing, Progressing     Description: Goals to be met by: DISCHARGE     Patient will increase functional independence with mobility by performin. Supine to sit and sit to supine with [place] - MET 2023  2. Sit to stand transfer with [place] - ONGOING  3. Gait  x 500 feet with [place] using No Assistive Device - ONGOING           Time Tracking     PT Received On: 23  PT Start Time: 1238     PT Stop Time: 1301  PT Total Time (min): 23 min     Billable Minutes: Gait Training 23    2023

## 2023-06-14 LAB
ALBUMIN SERPL-MCNC: 1.9 G/DL (ref 3.5–5)
ALBUMIN/GLOB SERPL: 0.6 RATIO (ref 1.1–2)
ALP SERPL-CCNC: 34 UNIT/L (ref 40–150)
ALT SERPL-CCNC: 99 UNIT/L (ref 0–55)
AST SERPL-CCNC: 49 UNIT/L (ref 5–34)
BASOPHILS # BLD AUTO: 0.13 X10(3)/MCL
BASOPHILS NFR BLD AUTO: 0.7 %
BILIRUBIN DIRECT+TOT PNL SERPL-MCNC: 0.3 MG/DL
BUN SERPL-MCNC: 46.5 MG/DL (ref 8.9–20.6)
CALCIUM SERPL-MCNC: 8.1 MG/DL (ref 8.4–10.2)
CHLORIDE SERPL-SCNC: 105 MMOL/L (ref 98–107)
CO2 SERPL-SCNC: 25 MMOL/L (ref 22–29)
CREAT SERPL-MCNC: 3.94 MG/DL (ref 0.73–1.18)
EOSINOPHIL # BLD AUTO: 0.92 X10(3)/MCL (ref 0–0.9)
EOSINOPHIL NFR BLD AUTO: 5 %
ERYTHROCYTE [DISTWIDTH] IN BLOOD BY AUTOMATED COUNT: 13.2 % (ref 11.5–17)
GFR SERPLBLD CREATININE-BSD FMLA CKD-EPI: 20 MLS/MIN/1.73/M2
GLOBULIN SER-MCNC: 3.2 GM/DL (ref 2.4–3.5)
GLUCOSE SERPL-MCNC: 83 MG/DL (ref 74–100)
HCT VFR BLD AUTO: 32.2 % (ref 42–52)
HGB BLD-MCNC: 10.2 G/DL (ref 14–18)
IMM GRANULOCYTES # BLD AUTO: 0.54 X10(3)/MCL (ref 0–0.04)
IMM GRANULOCYTES NFR BLD AUTO: 2.9 %
LYMPHOCYTES # BLD AUTO: 2.83 X10(3)/MCL (ref 0.6–4.6)
LYMPHOCYTES NFR BLD AUTO: 15.3 %
MAGNESIUM SERPL-MCNC: 1.8 MG/DL (ref 1.6–2.6)
MCH RBC QN AUTO: 28.9 PG (ref 27–31)
MCHC RBC AUTO-ENTMCNC: 31.7 G/DL (ref 33–36)
MCV RBC AUTO: 91.2 FL (ref 80–94)
MONOCYTES # BLD AUTO: 2.67 X10(3)/MCL (ref 0.1–1.3)
MONOCYTES NFR BLD AUTO: 14.4 %
NEUTROPHILS # BLD AUTO: 11.4 X10(3)/MCL (ref 2.1–9.2)
NEUTROPHILS NFR BLD AUTO: 61.7 %
NRBC BLD AUTO-RTO: 0 %
PHOSPHATE SERPL-MCNC: 5.1 MG/DL (ref 2.3–4.7)
PLATELET # BLD AUTO: 396 X10(3)/MCL (ref 130–400)
PMV BLD AUTO: 9.2 FL (ref 7.4–10.4)
POTASSIUM SERPL-SCNC: 4.1 MMOL/L (ref 3.5–5.1)
PROT SERPL-MCNC: 5.1 GM/DL (ref 6.4–8.3)
RBC # BLD AUTO: 3.53 X10(6)/MCL (ref 4.7–6.1)
SODIUM SERPL-SCNC: 140 MMOL/L (ref 136–145)
WBC # SPEC AUTO: 18.49 X10(3)/MCL (ref 4.5–11.5)

## 2023-06-14 PROCEDURE — 25000003 PHARM REV CODE 250: Performed by: STUDENT IN AN ORGANIZED HEALTH CARE EDUCATION/TRAINING PROGRAM

## 2023-06-14 PROCEDURE — 97116 GAIT TRAINING THERAPY: CPT

## 2023-06-14 PROCEDURE — 63600175 PHARM REV CODE 636 W HCPCS: Performed by: STUDENT IN AN ORGANIZED HEALTH CARE EDUCATION/TRAINING PROGRAM

## 2023-06-14 PROCEDURE — 83735 ASSAY OF MAGNESIUM: CPT

## 2023-06-14 PROCEDURE — 97535 SELF CARE MNGMENT TRAINING: CPT

## 2023-06-14 PROCEDURE — 63600175 PHARM REV CODE 636 W HCPCS

## 2023-06-14 PROCEDURE — 94761 N-INVAS EAR/PLS OXIMETRY MLT: CPT

## 2023-06-14 PROCEDURE — 97530 THERAPEUTIC ACTIVITIES: CPT

## 2023-06-14 PROCEDURE — 25000003 PHARM REV CODE 250: Performed by: FAMILY MEDICINE

## 2023-06-14 PROCEDURE — 80053 COMPREHEN METABOLIC PANEL: CPT

## 2023-06-14 PROCEDURE — 84100 ASSAY OF PHOSPHORUS: CPT

## 2023-06-14 PROCEDURE — 80100014 HC HEMODIALYSIS 1:1

## 2023-06-14 PROCEDURE — 27000221 HC OXYGEN, UP TO 24 HOURS

## 2023-06-14 PROCEDURE — 25000003 PHARM REV CODE 250: Performed by: INTERNAL MEDICINE

## 2023-06-14 PROCEDURE — 21400001 HC TELEMETRY ROOM

## 2023-06-14 PROCEDURE — 85025 COMPLETE CBC W/AUTO DIFF WBC: CPT | Performed by: STUDENT IN AN ORGANIZED HEALTH CARE EDUCATION/TRAINING PROGRAM

## 2023-06-14 RX ORDER — HYDROMORPHONE HYDROCHLORIDE 1 MG/ML
0.2 INJECTION, SOLUTION INTRAMUSCULAR; INTRAVENOUS; SUBCUTANEOUS EVERY 4 HOURS PRN
Status: DISCONTINUED | OUTPATIENT
Start: 2023-06-14 | End: 2023-06-14

## 2023-06-14 RX ORDER — HYDROMORPHONE HYDROCHLORIDE 1 MG/ML
0.2 INJECTION, SOLUTION INTRAMUSCULAR; INTRAVENOUS; SUBCUTANEOUS EVERY 6 HOURS PRN
Status: DISCONTINUED | OUTPATIENT
Start: 2023-06-14 | End: 2023-06-15

## 2023-06-14 RX ORDER — FUROSEMIDE 10 MG/ML
80 INJECTION INTRAMUSCULAR; INTRAVENOUS ONCE
Status: COMPLETED | OUTPATIENT
Start: 2023-06-14 | End: 2023-06-14

## 2023-06-14 RX ORDER — METHOCARBAMOL 750 MG/1
750 TABLET, FILM COATED ORAL 3 TIMES DAILY PRN
Status: DISCONTINUED | OUTPATIENT
Start: 2023-06-14 | End: 2023-06-16 | Stop reason: HOSPADM

## 2023-06-14 RX ADMIN — HYDROCODONE BITARTRATE AND ACETAMINOPHEN 1 TABLET: 10; 325 TABLET ORAL at 08:06

## 2023-06-14 RX ADMIN — HEPARIN SODIUM 5000 UNITS: 5000 INJECTION, SOLUTION INTRAVENOUS; SUBCUTANEOUS at 06:06

## 2023-06-14 RX ADMIN — LEVETIRACETAM 500 MG: 500 TABLET, FILM COATED ORAL at 08:06

## 2023-06-14 RX ADMIN — POLYETHYLENE GLYCOL 3350 17 G: 17 POWDER, FOR SOLUTION ORAL at 08:06

## 2023-06-14 RX ADMIN — GABAPENTIN 300 MG: 300 CAPSULE ORAL at 08:06

## 2023-06-14 RX ADMIN — METHOCARBAMOL 750 MG: 750 TABLET ORAL at 08:06

## 2023-06-14 RX ADMIN — HEPARIN SODIUM 5000 UNITS: 5000 INJECTION, SOLUTION INTRAVENOUS; SUBCUTANEOUS at 01:06

## 2023-06-14 RX ADMIN — LIDOCAINE PATCH 5% 1 PATCH: 700 PATCH TOPICAL at 04:06

## 2023-06-14 RX ADMIN — QUETIAPINE FUMARATE 100 MG: 100 TABLET, FILM COATED ORAL at 08:06

## 2023-06-14 RX ADMIN — HYDROMORPHONE HYDROCHLORIDE 0.2 MG: 0.5 INJECTION, SOLUTION INTRAMUSCULAR; INTRAVENOUS; SUBCUTANEOUS at 01:06

## 2023-06-14 RX ADMIN — HYDROMORPHONE HYDROCHLORIDE 0.5 MG: 1 INJECTION, SOLUTION INTRAMUSCULAR; INTRAVENOUS; SUBCUTANEOUS at 03:06

## 2023-06-14 RX ADMIN — HYDROMORPHONE HYDROCHLORIDE 0.5 MG: 1 INJECTION, SOLUTION INTRAMUSCULAR; INTRAVENOUS; SUBCUTANEOUS at 06:06

## 2023-06-14 RX ADMIN — GABAPENTIN 300 MG: 300 CAPSULE ORAL at 02:06

## 2023-06-14 RX ADMIN — HEPARIN SODIUM 5000 UNITS: 5000 INJECTION, SOLUTION INTRAVENOUS; SUBCUTANEOUS at 09:06

## 2023-06-14 RX ADMIN — HYDROMORPHONE HYDROCHLORIDE 0.5 MG: 1 INJECTION, SOLUTION INTRAMUSCULAR; INTRAVENOUS; SUBCUTANEOUS at 12:06

## 2023-06-14 RX ADMIN — HYDROCODONE BITARTRATE AND ACETAMINOPHEN 1 TABLET: 10; 325 TABLET ORAL at 02:06

## 2023-06-14 RX ADMIN — HYDROMORPHONE HYDROCHLORIDE 0.2 MG: 0.5 INJECTION, SOLUTION INTRAMUSCULAR; INTRAVENOUS; SUBCUTANEOUS at 05:06

## 2023-06-14 RX ADMIN — Medication 9 MG: at 08:06

## 2023-06-14 RX ADMIN — FUROSEMIDE 80 MG: 10 INJECTION, SOLUTION INTRAMUSCULAR; INTRAVENOUS at 01:06

## 2023-06-14 RX ADMIN — TAMSULOSIN HYDROCHLORIDE 0.4 MG: 0.4 CAPSULE ORAL at 08:06

## 2023-06-14 NOTE — PLAN OF CARE
Problem: Infection  Goal: Absence of Infection Signs and Symptoms  Outcome: Ongoing, Progressing     Problem: Adult Inpatient Plan of Care  Goal: Plan of Care Review  Outcome: Ongoing, Progressing  Goal: Patient-Specific Goal (Individualized)  Outcome: Ongoing, Progressing  Goal: Absence of Hospital-Acquired Illness or Injury  Outcome: Ongoing, Progressing  Goal: Optimal Comfort and Wellbeing  Outcome: Ongoing, Progressing  Goal: Readiness for Transition of Care  Outcome: Ongoing, Progressing     Problem: Fluid and Electrolyte Imbalance (Acute Kidney Injury/Impairment)  Goal: Fluid and Electrolyte Balance  Outcome: Ongoing, Progressing     Problem: Oral Intake Inadequate (Acute Kidney Injury/Impairment)  Goal: Optimal Nutrition Intake  Outcome: Ongoing, Progressing     Problem: Renal Function Impairment (Acute Kidney Injury/Impairment)  Goal: Effective Renal Function  Outcome: Ongoing, Progressing     Problem: Device-Related Complication Risk (Hemodialysis)  Goal: Safe, Effective Therapy Delivery  Outcome: Ongoing, Progressing     Problem: Hemodynamic Instability (Hemodialysis)  Goal: Effective Tissue Perfusion  Outcome: Ongoing, Progressing     Problem: Infection (Hemodialysis)  Goal: Absence of Infection Signs and Symptoms  Outcome: Ongoing, Progressing     Problem: Skin Injury Risk Increased  Goal: Skin Health and Integrity  Outcome: Ongoing, Progressing     Problem: Pain Acute  Goal: Acceptable Pain Control and Functional Ability  Outcome: Ongoing, Progressing     Problem: Adjustment to Illness (Sepsis/Septic Shock)  Goal: Optimal Coping  Outcome: Ongoing, Progressing     Problem: Bleeding (Sepsis/Septic Shock)  Goal: Absence of Bleeding  Outcome: Ongoing, Progressing     Problem: Glycemic Control Impaired (Sepsis/Septic Shock)  Goal: Blood Glucose Level Within Desired Range  Outcome: Ongoing, Progressing     Problem: Infection Progression (Sepsis/Septic Shock)  Goal: Absence of Infection Signs and  Symptoms  Outcome: Ongoing, Progressing     Problem: Nutrition Impaired (Sepsis/Septic Shock)  Goal: Optimal Nutrition Intake  Outcome: Ongoing, Progressing

## 2023-06-14 NOTE — MEDICAL/APP STUDENT
"  [facility]  General Surgery - GOLD Team  Progress Note  Admit Date: 6/2/2023  HD#12  POD#* No surgery date entered *    Subjective:   Interval history:  FRANKLINTOMCORNELIUS, AF   Worked with PT yesterday   Off O2  SW working on placement pending DC   NPO in anticipation of the OR today      Objective:     VITAL SIGNS: 24 HR MIN & MAX LAST   Temp  Min: 98.2 °F (36.8 °C)  Max: 99.7 °F (37.6 °C)  98.8 °F (37.1 °C)   BP  Min: 151/88  Max: 167/97  (!) 156/89    Pulse  Min: 81  Max: 100  96    Resp  Min: 18  Max: 20  18    SpO2  Min: 89 %  Max: 96 %  (!) 94 %      HT: 5' 7" (170.2 cm)  WT: 69.4 kg (153 lb)  BMI: 24     Intake/output:  .4 mL/kg.hr  Stool x1    Physical examination:  Gen: NAD, AAOx3, answering questions appropriately  CV: RR  Resp: NWOB  Abd: S/NT/ND  Ext: +2 edema   Neuro: CN II-XII grossly intact    Labs:  Recent Labs     06/11/23  0626 06/12/23  0319 06/13/23  0316 06/14/23  0325   WBC  --  20.01* 22.69*  --    HGB  --  10.6* 10.2*  --    HCT  --  31.8* 31.0*  --    PLT  --  365 374  --     139 141  --    K 3.8 4.2 4.0  --    CO2 25 23 26  --    BUN 30.8* 41.9* 36.6*  --    CREATININE 3.81* 4.30* 3.56*  --    BILITOT 0.4 0.3 0.3  --    AST 62* 60* 61*  --    * 117* 118*  --    ALKPHOS 32* 41 41  --    CALCIUM 8.3* 8.2* 8.2*  --    ALBUMIN 1.7* 1.8* 2.0*  --    MG 1.90 2.00 2.00 1.80   PHOS 4.0 4.7 5.0* 5.1*       Imaging:  CT Abdomen Pelvis  Without Contrast   Final Result      1. Anasarca.  There are pleural effusions and diffuse body wall edema.  Perihilar opacities may be related to pulmonary edema, multifocal pneumonia or ARDS.   2. Persistent left nephrogram compatible with acute renal insufficiency.   3. Limited assessment for gastrointestinal bleed in the absence of intravenous contrast         Electronically signed by: Cindy Davey   Date:    06/13/2023   Time:    16:08      X-Ray Chest 1 View   Final Result      Some improvement in the changes of pulmonary vascular " congestion and cardiac decompensation.      No other significant change         Electronically signed by: Emmanuel Waldron   Date:    06/12/2023   Time:    08:49      CT Cervical Spine Without Contrast   Final Result      CT Chest Abdomen Pelvis W W/O Contrast (XPD)   Final Result      X-Ray Chest 1 View   Final Result      Persistent pulmonary edema with slight improvement.         Electronically signed by: Eliazar Gibson MD   Date:    06/10/2023   Time:    10:52      X-Ray Chest 1 View   Final Result      Worsening consolidative changes more so on the left than on the right with some worsening also in the right upper lobe.      No other change         Electronically signed by: Emmanuel Waldron   Date:    06/08/2023   Time:    09:25      X-Ray Chest 1 View   Final Result   .   No significant interval change.         Electronically signed by: Adi Amato   Date:    06/07/2023   Time:    07:26      X-Ray Chest 1 View   Final Result      No significant interval change.         Electronically signed by: Ruiz Elam   Date:    06/06/2023   Time:    06:44      X-Ray Chest 1 View   Final Result      No adverse interval change.  Slight interval improvement.         Electronically signed by: Franco Gonzáles   Date:    06/05/2023   Time:    06:43      X-Ray Chest 1 View   Final Result      Unchanged multifocal right greater than left airspace opacities most consistent with multifocal pneumonia.         Electronically signed by: Eliazar Ronquillo MD   Date:    06/03/2023   Time:    18:58      X-Ray Chest 1 View   Final Result      As above.         Electronically signed by: Franco Gonzáles   Date:    06/03/2023   Time:    11:48      X-Ray Chest 1 View   Final Result      Interval placement of dialysis catheter with concern for developing right-sided effusion.  Hemothorax is not entirely excluded.         Electronically signed by: Eliazar Gibson MD   Date:    06/02/2023   Time:    22:26      CT Chest Abdomen Pelvis Without Contrast (XPD)    Final Result      1. Multifocal lung consolidation, likely pneumonia.   2. Small volume ascites with nonspecific gallbladder wall thickening.  Some of the ascitic fluid in the pelvis appears complex.   3. Mild hepatomegaly.         Electronically signed by: Arley Cuevas   Date:    06/02/2023   Time:    18:40      US Retroperitoneal Limited   Final Result      X-Ray Chest AP Portable   Final Result      Findings concerning for right mid to lower lung infectious process.         Electronically signed by: Franco Gonzáles   Date:    06/02/2023   Time:    13:48         I have reviewed all pertinent imaging results/findings within the past 24 hours.    Micro/Path:  BC pending     Assessment & Plan:   32M w/PMHx of polysubstance abuse admitted to the ICU w/ rhabdomyolysis. Surgery consulted for emergent tunneled HD catheter.     - MMPC  - Diet: NPO   - PT/OT  - Plan for OR today. Will follow up BC prior to surgery. Lab has not commented on BC in > 24 hours.     Dispo: LTAC and outpatient HD     Elly So   [employer] [facility]

## 2023-06-14 NOTE — PLAN OF CARE
Problem: Infection  Goal: Absence of Infection Signs and Symptoms  6/14/2023 0237 by Lauryn Gaitan RN  Outcome: Ongoing, Progressing  6/14/2023 0153 by Lauryn Gaitan RN  Outcome: Ongoing, Progressing     Problem: Adult Inpatient Plan of Care  Goal: Plan of Care Review  6/14/2023 0237 by Lauryn Gaitan RN  Outcome: Ongoing, Progressing  6/14/2023 0153 by Lauryn Gaitan RN  Outcome: Ongoing, Progressing  Goal: Patient-Specific Goal (Individualized)  6/14/2023 0237 by Lauryn Gaitan RN  Outcome: Ongoing, Progressing  6/14/2023 0153 by Lauryn Gaitan RN  Outcome: Ongoing, Progressing  Goal: Absence of Hospital-Acquired Illness or Injury  6/14/2023 0237 by Lauryn Gaitan RN  Outcome: Ongoing, Progressing  6/14/2023 0153 by Lauryn Gaitan RN  Outcome: Ongoing, Progressing  Goal: Optimal Comfort and Wellbeing  6/14/2023 0237 by Lauryn Gaitan RN  Outcome: Ongoing, Progressing  6/14/2023 0153 by Lauryn Gaitan RN  Outcome: Ongoing, Progressing  Goal: Readiness for Transition of Care  6/14/2023 0237 by Lauryn Gaitan RN  Outcome: Ongoing, Progressing  6/14/2023 0153 by Lauryn Gaitan RN  Outcome: Ongoing, Progressing     Problem: Fluid and Electrolyte Imbalance (Acute Kidney Injury/Impairment)  Goal: Fluid and Electrolyte Balance  6/14/2023 0237 by Lauryn Gaitan RN  Outcome: Ongoing, Progressing  6/14/2023 0153 by Lauryn Gaitan RN  Outcome: Ongoing, Progressing     Problem: Oral Intake Inadequate (Acute Kidney Injury/Impairment)  Goal: Optimal Nutrition Intake  6/14/2023 0237 by Lauryn Gaitan RN  Outcome: Ongoing, Progressing  6/14/2023 0153 by Lauryn Gaitan RN  Outcome: Ongoing, Progressing     Problem: Renal Function Impairment (Acute Kidney Injury/Impairment)  Goal: Effective Renal Function  6/14/2023 0237 by Lauryn Gaitan RN  Outcome: Ongoing, Progressing  6/14/2023 0153 by Lauryn Gaitan RN  Outcome: Ongoing, Progressing     Problem: Device-Related Complication Risk  (Hemodialysis)  Goal: Safe, Effective Therapy Delivery  6/14/2023 0237 by Lauryn Gaitan RN  Outcome: Ongoing, Progressing  6/14/2023 0153 by Lauryn Gaitan RN  Outcome: Ongoing, Progressing     Problem: Hemodynamic Instability (Hemodialysis)  Goal: Effective Tissue Perfusion  6/14/2023 0237 by Lauryn Gaitan RN  Outcome: Ongoing, Progressing  6/14/2023 0153 by Lauryn Gaitan RN  Outcome: Ongoing, Progressing     Problem: Infection (Hemodialysis)  Goal: Absence of Infection Signs and Symptoms  6/14/2023 0237 by Lauryn Gaitan RN  Outcome: Ongoing, Progressing  6/14/2023 0153 by Lauryn Gaitan RN  Outcome: Ongoing, Progressing     Problem: Skin Injury Risk Increased  Goal: Skin Health and Integrity  6/14/2023 0237 by Lauryn Gaitan RN  Outcome: Ongoing, Progressing  6/14/2023 0153 by Lauryn Gaitan RN  Outcome: Ongoing, Progressing     Problem: Pain Acute  Goal: Acceptable Pain Control and Functional Ability  6/14/2023 0237 by Lauryn Gaitan RN  Outcome: Ongoing, Progressing  6/14/2023 0153 by Lauryn Gaitan RN  Outcome: Ongoing, Progressing     Problem: Adjustment to Illness (Sepsis/Septic Shock)  Goal: Optimal Coping  6/14/2023 0237 by Lauryn Gaitan RN  Outcome: Ongoing, Progressing  6/14/2023 0153 by Lauryn Gaitan RN  Outcome: Ongoing, Progressing     Problem: Bleeding (Sepsis/Septic Shock)  Goal: Absence of Bleeding  6/14/2023 0237 by Lauryn Gaitan RN  Outcome: Ongoing, Progressing  6/14/2023 0153 by Lauryn Gaitan RN  Outcome: Ongoing, Progressing     Problem: Glycemic Control Impaired (Sepsis/Septic Shock)  Goal: Blood Glucose Level Within Desired Range  6/14/2023 0237 by Lauryn Gaitan RN  Outcome: Ongoing, Progressing  6/14/2023 0153 by Lauryn Gaitan RN  Outcome: Ongoing, Progressing     Problem: Infection Progression (Sepsis/Septic Shock)  Goal: Absence of Infection Signs and Symptoms  6/14/2023 0237 by Lauryn Gaitan RN  Outcome: Ongoing, Progressing  6/14/2023 0153 by  Lauryn Gaitan RN  Outcome: Ongoing, Progressing     Problem: Nutrition Impaired (Sepsis/Septic Shock)  Goal: Optimal Nutrition Intake  6/14/2023 0237 by Lauryn Gaitan RN  Outcome: Ongoing, Progressing  6/14/2023 0153 by Lauryn Gaitan RN  Outcome: Ongoing, Progressing

## 2023-06-14 NOTE — PROGRESS NOTES
[facility]    Progress Note      Patient Name: Ryan Wild  MRN: [id number]  Admission Date: 6/2/2023  Attending Physician: Naida Egan MD   Primary Care Provider: Adilene Dillon NP    Patient information was obtained from patient and ER records.     Subjective:     Principal Problem:Non-traumatic rhabdomyolysis    Chief Complaint:   Chief Complaint   Patient presents with    Back Pain    Hypotension    Hearing Problem     PT REPORTS WAKING UP W LOWER BACK PAIN, WEAKNESS, SOB AND HEARING LOSS. 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  DENIES DRUG USE. HX OF SEIZURES, NON COMPLIANT W MEDS > 1 MONTH.  .  EKG OBTAINED.         HPI: Ryan Wild is a 32 y.o. male with history of seizures (off Keppra), drug use, hepatitis C (treated), and solitary kidney who presented to the ED on 6/2/2023  with a primary complaint of back pain. Patient is a  who had worked a 12 hour shift out in the sun the day prior. States that he went to bed feeling great, without any complaints. Then he woke up late for work day of admission and admits to not remembering much of what happened afterwards. His coworkers brought him to the ED. Admits to severe back pain that is aggravated by movement, weakness, shortness of breath. Also has not urinated all day. Denies chest pain, palpitations, headache, nausea, vomiting, abdominal pain, fevers. Last IV drug use 1 year ago, but snorted meth 2 days ago. Also uses IM steroids, last injection last night. Has not taken his home Keppra for the past few months; his last seizure was on 8/2022.      In the ED, patient presented hypotensive, tachycardic, SpO2 89% on RA. He was placed on bipap. Vasopressors were started when patient continued to be hypotensive with IVF. Labs significant for WBC 42.1, K 6.9, CO2 14, Cr 3.77, , . CPK 37k. LDH 2,2k. CBG normal. Troponin 1.878,  EKG no ischemic changes. Lactic 8.0. CXR  with increased interstitial markings in the right greater than left lung. Bedside Echo no abnormalities. Pt was given Vanc/Zosyn x1, and 4L bolus NS total. Medicine was called to admit patient for septic shock with multiorgan dysfunction.    Hospital course:  06/03/2023: Patient started on HD emergently, Levophed, Precedex, BiPAP, good response to Lasix  06/04/2023:  Recommend repeat HD today, nephro to see, off Levophed, continue Precedex, BiPAP, consider proning after dialysis, if no dialysis planned we will give repeat dose of Lasix.  6.5.23: No acute events overnight, patient remains on Precedex at max. Concerned about his status and why he is still in-house. Unable to tolerate vapotherm or proning for any appreciable amount of time. Currently being dialyzed.   6.6.23: NAEON. HD yesterday. Removed 0.5-1L ultrafiltrate. Well tolerated.  Able to wean off BIPAP to CPAP. HFNC at supper time and able to tolerate small meal. Back on CPAP at night. Still requiring sedation with Precedex. Currently on Vapotherm at 100% O2.  6.7.23: Desatted to low 79-80s on vapotherm. Initially refused to be placed back CPAP. However, was reasonable to be return to it after speaking with ICU nursing staff. Has been irritable and still reports he is in pain. Received Morphine x1. Still on Precedex 1.4mcg. Appears untouched. Has Net positive fluid balance.   6.8.23: Dialyzed yesterday morning. Removed approx 1.5L. Had an additional 850mL in urinary output. Tolerated Vapotherm. O2 sats remained in <90s.   CK improved prior to dialyzing. He still reports significant back pain requiring scheduled dilaudid. No longer on Precedex.   Has an appetite, but not eating much.   6.9.23: Downgraded to tele. Feels his breathing is better, however is more swollen than normal. Complaints of low back and bilateral limb pain. Requesting Dilaudid every three hours. Appetite improving. Urinating, however states he  doesn't know when happening.  6.10.23: NAEO.  Tolerating HFNC.  Patient continues to report whole-body pain which is worse in his back.  His oxygen requirements have not significantly decreased.  We will have the patient work with PT/OT.  CK continues to downtrend, does have persistent leukocytosis.  Cultures have grown nothing.  He has received 8 days of pneumonia coverage.  6.11.23: NAEON. VSS. Received HD one day ago. >2L UF removed. Still feeling swollen. Oxygen requirements have not decreased significantly. Patient participated, but did not tolerate PT due to low back and leg pain. Reports pain not well controlled with Dilaudid and Norco 10. Appetite improving.  6.12.23: NAEON. VSS. Saturating well on Vapotherm. Still with visible swelling to upper extremities and penis. Lower extremities have improved. Back pain with some improvement.  Swelling also improved.  Added Robaxin (renal dose) to pain regimen. No other issues or concerns at this time.  6.13.23: NAEON. VSS. Off oxygen. Patient eating and drinking without difficulty. Pain still significant in lower back. Currently 5/10. Fentanyl patch helping. Weaning pain medications. He states that he would like to be discharged to outpatient PT and to [place] for Rehab once ready. Understands plan for outpatient HD. Tunnel cath planned for this Friday 6.16.23    Interval history:  No acute events overnight.  No longer requiring supplemental oxygen.  Saturations in the high 90s.  Developed superficial venous thromboses in upper extremities.  No changes in anticoagulation.  Currently being dialyzed.  Low back pain and generalized swelling improving.  Reports being in no acute distress.      Past Medical History:   Diagnosis Date    Depression     Opioid abuse     Seizures     Solitary kidney, congenital     Unspecified viral hepatitis C without hepatic coma        Past Surgical History:   Procedure Laterality Date    TONSILLECTOMY         Review of patient's  allergies indicates:  No Known Allergies    No current facility-administered medications on file prior to encounter.     Current Outpatient Medications on File Prior to Encounter   Medication Sig    albuterol (PROVENTIL/VENTOLIN HFA) 90 mcg/actuation inhaler INHALE 2 PUFFS BY MOUTH EVERY 6 HOURS AS NEEDED FOR SHORTNESS OF BREATH OR WHEEZING    fluticasone propionate (FLONASE) 50 mcg/actuation nasal spray SHAKE LIQUID AND USE 2 SPRAYS IN EACH NOSTRIL DAILY    levETIRAcetam (KEPPRA) 500 MG Tab Take 1 tablet by mouth 2 (two) times daily.    QUEtiapine (SEROQUEL) 100 MG Tab Take 1 tablet (100 mg total) by mouth nightly.    valACYclovir (VALTREX) 1000 MG tablet Take 1,000 mg by mouth 3 (three) times daily.    venlafaxine (EFFEXOR-XR) 37.5 MG 24 hr capsule Take 37.5 mg by mouth every morning.     Family History       Problem Relation (Age of Onset)    Cerebral aneurysm Father          Tobacco Use    Smoking status: Every Day     Types: Vaping with nicotine    Smokeless tobacco: Current   Substance and Sexual Activity    Alcohol use: Not Currently    Drug use: Not Currently     Types: Heroin, Methamphetamines     Comment: 3 years sober    Sexual activity: Yes     Partners: Female     Review of Systems   Constitutional:  Negative for chills and fever.   Gastrointestinal:  Negative for abdominal pain, nausea and vomiting.   Genitourinary:  Negative for difficulty urinating, dysuria, penile swelling and scrotal swelling.   Musculoskeletal:  Positive for back pain.   Neurological:  Positive for weakness. Negative for headaches.   Psychiatric/Behavioral:  Negative for hallucinations. The patient is not nervous/anxious.    Objective:     Vital Signs (Most Recent):  Temp: 98.8 °F (37.1 °C) (06/14/23 0435)  Pulse: 96 (06/14/23 0435)  Resp: 18 (06/14/23 0333)  BP: (!) 156/89 (06/14/23 0435)  SpO2: (!) 94 % (06/14/23 0435) Vital Signs (24h Range):  Temp:  [98.2 °F (36.8 °C)-99.7 °F (37.6 °C)] 98.8 °F (37.1 °C)  Pulse:  []  96  Resp:  [18-20] 18  SpO2:  [89 %-96 %] 94 %  BP: (151-167)/() 156/89     Weight: 69.4 kg (153 lb)  Body mass index is 23.96 kg/m².    Physical Exam  Constitutional:       General: He is awake. He is not in acute distress.     Appearance: Normal appearance. He is normal weight. He is not ill-appearing or diaphoretic.      Comments: Lying in bed on Vapotherm. Awake and alert.   Eyes:      Extraocular Movements: Extraocular movements intact.      Conjunctiva/sclera: Conjunctivae normal.   Cardiovascular:      Rate and Rhythm: Normal rate and regular rhythm.      Pulses: Normal pulses.           Dorsalis pedis pulses are 2+ on the right side and 2+ on the left side.      Heart sounds: Normal heart sounds.      Arteriovenous access: Right and left arteriovenous access is present.  Pulmonary:      Effort: Pulmonary effort is normal.      Breath sounds: Normal air entry. No wheezing.   Chest:      Chest wall: No tenderness.   Abdominal:      General: Abdomen is flat. Bowel sounds are normal.      Palpations: Abdomen is soft.      Tenderness: There is no abdominal tenderness. There is no guarding.   Musculoskeletal:         General: No tenderness. Normal range of motion.      Lumbar back: Deformity present. No signs of trauma or lacerations.      Right knee: Normal.      Left knee: Normal.      Right lower leg: No edema.      Left lower leg: No edema.      Right foot: Swelling present. No deformity.      Left foot: Swelling present.   Feet:      Right foot:      Skin integrity: Warmth present.      Left foot:      Skin integrity: Skin integrity normal.   Skin:     General: Skin is warm.      Capillary Refill: Capillary refill takes 2 to 3 seconds.      Coloration: Skin is not cyanotic, jaundiced or mottled.   Neurological:      Mental Status: He is alert and oriented to person, place, and time.      GCS: GCS eye subscore is 4. GCS verbal subscore is 5. GCS motor subscore is 6.   Psychiatric:         Mood and  Affect: Mood normal.         Behavior: Behavior is cooperative.          Significant Labs: All pertinent labs within the past 24 hours have been reviewed.     Latest Reference Range & Units 06/12/23 03:19   WBC 4.50 - 11.50 x10(3)/mcL 20.01 (H)   RBC 4.70 - 6.10 x10(6)/mcL 3.61 (L)   Hemoglobin 14.0 - 18.0 g/dL 10.6 (L)   Hematocrit 42.0 - 52.0 % 31.8 (L)   MCV 80.0 - 94.0 fL 88.1   MCH 27.0 - 31.0 pg 29.4   MCHC 33.0 - 36.0 g/dL 33.3   RDW 11.5 - 17.0 % 12.9   Platelets 130 - 400 x10(3)/mcL 365   MPV 7.4 - 10.4 fL 9.7   Neut % % 80.0   LYMPH % % 7.2   Mono % % 11.0   Eosinophil % % 0.3   Basophil % % 0.1   Immature Granulocytes % 1.4   Neut # 2.1 - 9.2 x10(3)/mcL 15.97 (H)   Lymph # 0.6 - 4.6 x10(3)/mcL 1.45   Mono # 0.1 - 1.3 x10(3)/mcL 2.21 (H)   Eos # 0 - 0.9 x10(3)/mcL 0.06   Baso # <=0.2 x10(3)/mcL 0.03   Immature Grans (Abs) 0 - 0.04 x10(3)/mcL 0.29 (H)   nRBC % 0.0   Sodium 136 - 145 mmol/L 139   Potassium 3.5 - 5.1 mmol/L 4.2   Chloride 98 - 107 mmol/L 105   CO2 22 - 29 mmol/L 23   BUN 8.9 - 20.6 mg/dL 41.9 (H)   Creatinine 0.73 - 1.18 mg/dL 4.30 (H)   eGFR mls/min/1.73/m2 18   Glucose 74 - 100 mg/dL 107 (H)   Calcium 8.4 - 10.2 mg/dL 8.2 (L)   Phosphorus 2.3 - 4.7 mg/dL 4.7   Magnesium 1.60 - 2.60 mg/dL 2.00   Alkaline Phosphatase 40 - 150 unit/L 41   PROTEIN TOTAL 6.4 - 8.3 gm/dL 5.3 (L)   Albumin 3.5 - 5.0 g/dL 1.8 (L)   Albumin/Globulin Ratio 1.1 - 2.0 ratio 0.5 (L)   BILIRUBIN TOTAL <=1.5 mg/dL 0.3   AST 5 - 34 unit/L 60 (H)   ALT 0 - 55 unit/L 117 (H)   Globulin, Total 2.4 - 3.5 gm/dL 3.5   CPK 30 - 200 U/L 459 (H)   (H): Data is abnormally high  (L): Data is abnormally low     Latest Reference Range & Units 06/12/23 04:34   POC PH 7.350 - 7.450  7.440   POC PO2 75.0 - 100.0 mmHg 102.0 (H)   POC HCO3 22.0 - 26.0 mmol/L 29.2 (H)   Sample site  Right Radial Artery   Drawn by  AJ   pCO2, Blood gas 35.0 - 45.0 mmHg 43.0   THb, Blood gas 12 - 18 g/dL 10.2 (L)   sO2, Blood gas >=90.0 % 99.5   O2 Hb, Blood  Gas 94.0 - 100.0 % 96.5   TOC2, Blood gas 22.0 - 26.0 mmol/L 30.5 (H)   pAO2 mmHg 481   A-aDO2 mmHg 379   FIO2, Blood gas % 75.0   LPM  13.0   Allens Test  Yes   Oxygen Device, Blood gas  High Flow Cannula   (H): Data is abnormally high  (L): Data is abnormally low    Significant Imaging: I have reviewed all pertinent imaging results/findings within the past 24 hours.    Imaging Results              CT Chest Abdomen Pelvis Without Contrast (XPD) (Final result)  Result time 06/02/23 18:40:12      Final result by Arley Cuevas MD (06/02/23 18:40:12)                   Impression:      1. Multifocal lung consolidation, likely pneumonia.  2. Small volume ascites with nonspecific gallbladder wall thickening.  Some of the ascitic fluid in the pelvis appears complex.  3. Mild hepatomegaly.      Electronically signed by: Arley Cuevas  Date:    06/02/2023  Time:    18:40               Narrative:    EXAMINATION:  CT CHEST ABDOMEN PELVIS WITHOUT CONTRAST(XPD)    CLINICAL HISTORY:  Sepsis; Sepsis, unspecified organism    TECHNIQUE:  CT imaging from the chest through the pelvis without IV contrast. Axial, coronal and sagittal reformatted images reviewed. Dose length product 263 mGycm. Automatic exposure control, adjustment of mA/kV or iterative reconstruction technique used to limit radiation dose.    COMPARISON:  No relevant comparison studies available at the time of dictation.    FINDINGS:  Assessment of the visceral organs and vasculature is limited by the lack of IV contrast.    Lung and large airways: Multifocal consolidation in both lungs, greatest in the lower lobes.    Pleura: No significant pleural fluid.    Mediastinum and nae: Normal heart size.  No pathologically enlarged lymph node identified.    Chest wall/axilla and lower neck: No significant findings.    Liver/biliary: Mild hepatomegaly.  Suspected gallbladder wall thickening.  No biliary dilatation.    Pancreas: Normal.    Spleen: Normal.    Adrenals:  Normal.    Genitourinary: Right kidney not identified.  No urinary stone or hydronephrosis on the left.  Normal bladder.    Stomach/bowel: No bowel obstruction.    Abdominal/pelvic lymph nodes and peritoneum: No pathologically enlarged lymph node identified. Small volume ascites with increased fluid density in portions of the pelvis.    Abdominal and pelvic vasculature: Normal abdominal aortic caliber.    Abdominal wall: High-riding right testicle along the right inguinal canal.    Musculoskeletal: No acute osseous findings.                                       US Retroperitoneal Limited (Final result)  Result time 06/02/23 16:31:06      Final result by Familia John MD (06/02/23 16:31:06)                   Narrative:    EXAMINATION  US RETROPERITONEAL LIMITED    CLINICAL HISTORY  lalita;    TECHNIQUE  Multiplanar grayscale sonographic images were obtained of the retroperitoneum.    COMPARISON  None available at the time of initial interpretation.    FINDINGS  Exam quality: adequate for evaluation    Kidneys: The right kidney is not visualized, with no focal or otherwise suspicious findings of the ipsilateral retroperitoneal region.  The left kidney measures 14.1 cm x 7.2 cm x 6.3 cm.  The left renal parenchyma is homogeneous and normal by sonographic appearance, with smooth marginated cortex.  No masses or complex cysts are appreciated.  No collecting system dilatation.  No shadowing calcification is identified.  No perinephric collection or free abdominal fluid.    Miscellaneous: There is incidentally appreciated fluid partially visualized through the left abdominal cavity.  Scattered internal punctate echogenic foci may represent associated debris.    IMPRESSION  1. Solitary left kidney, without sonographic findings of acute urologic abnormality.  2. Partially visualized fluid with echogenic debris at the left hemiabdomen is nonspecific; differential includes complex ascites or collection, versus distended  stomach/bowel.      Electronically signed by: Familia John  Date:    06/02/2023  Time:    16:31                                     X-Ray Chest AP Portable (Final result)  Result time 06/02/23 13:48:55      Final result by Franco Gonzáles MD (06/02/23 13:48:55)                   Impression:      Findings concerning for right mid to lower lung infectious process.      Electronically signed by: Franco Gonzáles  Date:    06/02/2023  Time:    13:48               Narrative:    EXAMINATION:  XR CHEST AP PORTABLE    CLINICAL HISTORY:  Sepsis;    TECHNIQUE:  Single view of the chest    COMPARISON:  No prior imaging available for comparison.    FINDINGS:  Increased interstitial markings in the right greater than left lung.    The cardiomediastinal silhouette is within normal limits.    No acute osseous abnormality.                                      CXR 6.12.23 My read:  Portable chest x-ray.  Patient appears rotated to the right slightly.  No bony deformities or fractures appreciated.  Cardiac silhouette appears to be normal but partially obscured inferiorly.  Bilateral consolidations appear improved, with less focal density and less his at the right lung base.    Assessment/Plan:     Acute renal failure secondary to rhabdo secondary to above  Fluid overloaded  -Continues to improve. CK downtrending. Nephrology following.   -HD sessions per MWF schedule while inpatient. -No diuresis while receiving HD, per Nephrology.  -Tunnel Cath scheduled for Friday 6.16.23    Leukocytosis  -Completed prednisone for 5 days. Expect WBCs to decrease. Continue to monitor.  -Has remained afebrile.  -repeat blood cultures prior to HD Cath insertion.  -completed course of abx  -chest x-ray improving    Low back pain  - PT/OT following.   - now on multimodal pain control. Lowest dose Dilaudid. Weaning to [id number].    Hypoxia- Resolved  -Stable off supplemental O2.   -Monitoring saturations    Intracardiac shunting  NSTEMI type 2, however  given high peak concern for type 1  -Echo 50%EF (normal). Positive for intracardiac shunt. Cardiology following. Possible EUGENIA once hemodynamically stable.       Seizure disorder  - Continue Keppra 500mg po BID.    History of polysubstance use disorder  History of anabolic steroid abuse  - Case management assisting with  rehab/substance abuse assistance.        CODE STATUS: FULL   Access: HD line, art line, PIV  Antibiotics: None  Diet: Renal on Dialysis  DVT Prophylaxis: Heparin 5K units BID.  Fluids: None.    GI Prophylaxis: Compazine PRN.  Oxygen requirements: None.  Pain: IV Dilaudid 0.2mg [id number]; PO Norco 10 q6hPRN, Robaxin 500 mg t.i.d. (renal dose), fentanyl patch 50 mcg p.r.n..  Sedation: None        Disposition: Continue to de-escalate pain medications. Continue Oxygen status monitoring. HD per nephrology recs.     Dillon Dominguez MD  Department of Hospital Medicine

## 2023-06-14 NOTE — PT/OT/SLP PROGRESS
Physical Therapy Treatment    Patient Name:  Ryan Wild   MRN:  [id number]    Recommendations     Discharge Recommendations:  home   Discharge Equipment Recommendations: none   Barriers to discharge: fall risk, severity of deficits, and decreased endurance    Assessment     Ryan Wild is a 32 y.o. male admitted with a medical diagnosis of Non-traumatic rhabdomyolysis.    Methamphetamine/fentanyl overdose  Acute renal failure secondary to rhabdo secondary to above- Improving  Whole-body pain  Low back pain  Complex abdominal fluid collection  Fluid overloaded  Hyperkalemia- Resolved with dialysis  HTN 2/2 above  Pneumonia-community-acquired with possible component of aspiration.  Leukocytosis  Sepsis secondary to above- Resolved  Shock 2/2 above - Resolved  Intracardiac shunting  NSTEMI type 2, however given high peak concern for type 1  Seizure disorder  History of polysubstance use disorder  History of anabolic steroid abuse           Patient Active Problem List   Diagnosis    Hepatitis C virus infection without hepatic coma    Other bipolar disorder    Opioid use disorder, moderate, in sustained remission    Amphetamine use disorder, severe, in sustained remission    KINGSLEY (acute kidney injury)    NSTEMI (non-ST elevated myocardial infarction)    Non-traumatic rhabdomyolysis    Shock    Solitary kidney, congenital    Hyperkalemia    Hypermagnesemia    Elevated levels of transaminase & lactic acid dehydrogenase    Seizure disorder     He presents with the following impairments/functional limitations:  impaired endurance, gait instability, impaired balance, decreased safety awareness.    Rehab Prognosis: Good.    Patient would benefit from continued skilled acute PT services to: address above listed impairments/functional limitations; receive patient/caregiver education; reduce fall risk; and maximize independency/safety with functional mobility.    -continued: ambulation, with progression of gait  distance/frequency/duration and speed, as tolerated/appropriate, with assistance and supervision  -will discharge PT SERVICES once STG's met by patient    Recent Surgery: Procedure(s) (LRB):  Insertion, Catheter, Central Venous, Hemodialysis (N/A)      Plan     During this hospitalization, patient to be seen 3 x/week to address the identified impairments/functional limitations via therapeutic activities, therapeutic exercises, gait training and progress toward the established goals.    Plan of Care Expires:  07/08/23    Subjective     Communicated with patient's nurse Adry prior to session.    Patients nurse Adry into room to administer medications at beginning of therapy session    Patient agreeable to participate in treatment session.    Chief Complaint: pain  Patient/Family Comments/goals: home  Pain/Comfort:  Pain Rating 1: 9/10  Location - Side 1: Bilateral  Location - Orientation 1: lower  Location 1: back  Pain Addressed 1: Nurse notified, Cessation of Activity, Distraction, Reposition  Pain Rating Post-Intervention 1: 9/10    Objective     Patient found supine in bed, with HOB elevated, and bed rails up bilateral HOB with telemetry, pulse ox (continuous) (HD Cath)  upon PT entry to room.    General Precautions: Standard, fall, seizure, hearing impaired   Orthopedic Precautions:N/A   Braces: N/A  Respiratory Status: room air    Functional Mobility:    Bed Mobility:  Rolling Left: independence  Rolling Right: independence  Scooting: independence  Supine to Sit: independence  Sit to Supine: independence  with no cues required    Transfers:  Sit to Stand: modified independence with no assistive device  with no cues required    Gait:  Patient ambulated 200ft  Standing pause x3 minutes  Patient ambulated 100ft  Standing pause x1 minute  Patient ambulated 300ft  With no assistive device and modified independence.  Patient demonstrates  bilat LE hip external rotation, steady gait, symmetrical step length, upright  posture, reciprocal arm swing, no loss of balance, no mis-steps, and wide base of support.    Other Mobility: (prior to out of bed/ambulation) (patients nurse Adry in/out of room - to administer medication prior to gait session)  Therapeutic Exercises performed:   1 set times 10 repetitions  active range of motion  bilat lower extremity       -bed level exercises:              ankle pumps            ankle DF w/ end range hold x3 count       -seated exercises:              long arc quads  Therapeutic Activities performed:        -sitting balance activities:                       weight shifting:                   -laterally (left)                 -laterally (right)            scooting:                   -forward                 -backward            repositioning at edge of bed  *generally slowed/guarded mvmts - all transitional activities    Balance:  Sit  Patient demonstrated static balance on level surface with independence with no verbal cues.  Patient demonstrated dynamic balance on level surface with independence with no verbal cues during moderate excursions.  Stand  Patient demonstrated static balance on level surface  using no device with independence with no verbal cues.    Patient left supine in bed, with HOB elevated, and bed rails up bilateral HOB with telemetry, pulse ox (continuous) (HD Cath) all lines intact, call button in reach, tray table at bedside, bedside commode at bedside, and pt's nurse Adry notified.    Goals     Multidisciplinary Problems       Physical Therapy Goals       Problem: Physical Therapy    Goal Priority Disciplines Outcome Goal Variances Interventions   Physical Therapy Goal     PT, PT/OT Ongoing, Progressing     Description: Goals to be met by: DISCHARGE     Patient will increase functional independence with mobility by performin. Supine to sit and sit to supine with [place] - MET 2023  2. Sit to stand transfer with [place] - ONGOING  3. Gait  x 500 feet  with [place] using No Assistive Device - ONGOING           Time Tracking     PT Received On: 06/14/23  PT Start Time: 1328     PT Stop Time: 1352  PT Total Time (min): 24 min     Billable Minutes: Gait Training 14 and Therapeutic Activity 10    06/14/2023

## 2023-06-14 NOTE — PLAN OF CARE
Problem: Infection  Goal: Absence of Infection Signs and Symptoms  Outcome: Ongoing, Progressing     Problem: Adult Inpatient Plan of Care  Goal: Absence of Hospital-Acquired Illness or Injury  Outcome: Ongoing, Progressing  Goal: Optimal Comfort and Wellbeing  Outcome: Ongoing, Progressing  Goal: Readiness for Transition of Care  Outcome: Ongoing, Progressing     Problem: Fluid and Electrolyte Imbalance (Acute Kidney Injury/Impairment)  Goal: Fluid and Electrolyte Balance  Outcome: Ongoing, Progressing     Problem: Oral Intake Inadequate (Acute Kidney Injury/Impairment)  Goal: Optimal Nutrition Intake  Outcome: Ongoing, Progressing     Problem: Renal Function Impairment (Acute Kidney Injury/Impairment)  Goal: Effective Renal Function  Outcome: Ongoing, Progressing     Problem: Device-Related Complication Risk (Hemodialysis)  Goal: Safe, Effective Therapy Delivery  Outcome: Ongoing, Progressing     Problem: Hemodynamic Instability (Hemodialysis)  Goal: Effective Tissue Perfusion  Outcome: Ongoing, Progressing     Problem: Infection (Hemodialysis)  Goal: Absence of Infection Signs and Symptoms  Outcome: Ongoing, Progressing     Problem: Skin Injury Risk Increased  Goal: Skin Health and Integrity  Outcome: Ongoing, Progressing     Problem: Pain Acute  Goal: Acceptable Pain Control and Functional Ability  Outcome: Ongoing, Progressing     Problem: Bleeding (Sepsis/Septic Shock)  Goal: Absence of Bleeding  Outcome: Ongoing, Progressing

## 2023-06-14 NOTE — PROGRESS NOTES
06/14/23 1127   Post-Hemodialysis Assessment   Blood Volume Processed (Liters) 59 L   Dialyzer Clearance Clear   Duration of Treatment 180 minutes   Total UF (mL) 1500 mL   Patient Response to Treatment NAD   Post-Hemodialysis Comments vss

## 2023-06-14 NOTE — PT/OT/SLP PROGRESS
Physical Therapy    Missed Treatment Session    Patient Name:  Ryan Wild   MRN:  [id number]      Patient not seen at this time secondary to Dialysis.  Will follow-up as patient is available to participate and as therapists' schedule allows.

## 2023-06-14 NOTE — PT/OT/SLP PROGRESS
Occupational Therapy   Treatment and Discharge    Name: Ryan Wild  MRN: [id number]  Admitting Diagnosis:  Non-traumatic rhabdomyolysis   , CKD on HD MWF, pending tunneled cath Friday 6/16, hypoxia, ARF due to rhabdo, NSTEMI, ,low back pain    Recommendations:     Discharge Recommendations: home, outpatient PT  Discharge Equipment Recommendations:  none  Barriers to discharge:       Assessment:     Ryan Wild is a 32 y.o. male with a medical diagnosis of Non-traumatic rhabdomyolysis.  Pt has made good progress, has met OT goals. Pt able to complete ADL tasks at mod I level and able to mobilize at I level.    Plan:     Dc pt from OT services.    Subjective     Pain/Comfort:  Pain Rating 1: 0/10    Objective:     Communicated with: RN prior to session.  Patient found HOB elevated with telemetry, pulse ox (continuous) upon OT entry to room.    General Precautions: Standard, fall, seizure    Orthopedic Precautions:N/A  Braces: N/A  Respiratory Status: Room air  Vital Signs: HR: 126 at rest, pt increased HR to 143 with mobility  Sp02: 91%     Occupational Performance:     Bed Mobility:    Patient completed Rolling/Turning to Left with  independence  Patient completed Rolling/Turning to Right with independence  Patient completed Scooting/Bridging with independence  Patient completed Supine to Sit with independence  Patient completed Sit to Supine with independence     Functional Mobility/Transfers:  Patient completed Sit <> Stand Transfer with independence  with  no assistive device   Patient completed Toilet Transfer Step Transfer technique with modified independence with  no AD; pt able to ambulate to bathroom in room, complete standard toilet t/f mod I, sit>stand from toilet without UE support mod I x 3 trials  Functional Mobility: pt able to ambulate in room and in hallway to retrieve coffee mod I without AD; pt able to withstand moderate resistance in static standing    Activities of Daily Living:  Lower Body  "Dressing: pt donned B socks, pull on shorts I mild difficulty due to edema  Toileting: pt report I with urinal, and use of BSC I'ly ; reports some difficulty with posterior pericleaning due to "I'm so swollen, can't reach good, so I get the nurse to help to be sure I'm clean"; during treatment session worked on posterior reaching, recommend use of standard toilet for BM for ease of cleaning due to grab bar availability and larger space    Therapeutic Activities:  Pt able to ambulate to closet in room to retrieve clothing items, good standing balance     AMPAC 6 Click ADL:      Patient Education:  Patient provided with verbal education regarding OT role/goals/POC and home exercise program .  Understanding was verbalized.    Education on increasing OOB activity, ambulation multiple times per day with close monitoring of HR    Patient left HOB elevated with all lines intact, call button in reach, and lunch tray set up    GOALS:   Multidisciplinary Problems       Occupational Therapy Goals          Problem: Occupational Therapy    Goal Priority Disciplines Outcome Interventions   Occupational Therapy Goal     OT, PT/OT Ongoing, Progressing    Description: Goals to be met by: d/c     Patient will increase functional independence with ADLs by performing:    LE Dressing with Modified [place].    Grooming while standing at sink with [place].    Toileting from toilet with Modified [place] for hygiene and clothing management.     Toilet transfer to toilet with Modified [place].                         Time Tracking:     OT Date of Treatment: 06/14/23  OT Start Time: 1212  OT Stop Time: 1235  OT Total Time (min): 23 min    Billable Minutes:Self Care/Home Management 1 unit  Therapeutic Activity 1 unit    OT/DANNY: OT     Number of DANNY visits since last OT visit: 1    6/14/2023      "

## 2023-06-14 NOTE — PROGRESS NOTES
[facility]  Nephrology Progress Note    Patient Name: Ryan Wild  Age: 32 y.o.  : 1990  MRN: [id number]  Admission Date: 2023    Chief complaint: Back Pain, Hypotension, and Hearing Problem (PT REPORTS WAKING UP W LOWER BACK PAIN, WEAKNESS, SOB AND HEARING LOSS. 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  DENIES DRUG USE. HX OF SEIZURES, NON COMPLIANT W MEDS > 1 MONTH.  .  EKG OBTAINED. )      Hospital course  Ryan Wild is a 32 y.o. White male with past medical history of seizures, polysubstance abuse, treated hepatitis-C, and solitary kidney.  Patient presented to the emergency department on 2023 with complaints of back pain, shortness breath, and decreased urinary frequency.  Patient believes that symptoms were precipitated by the fact that he has been working outside in the heat for 12 hours. He admitted to polysubstance abuse, including intravenous drug use, and anabolic steroid use.  UDS was positive for methamphetamines and fentanyl. In the emergency department, patient was tachycardic, hypotensive, and hypoxemic.  Laboratory results were remarkable for severe leukocytosis (WBC 41), azotemia, metabolic acidosis, hyperkalemia (serum potassium 6.9), transaminitis, hyperphosphatemia, elevated troponin, and elevated lactic acid level.  CK was 37,420 units per L.  Patient was admitted to the intensive care unit, placed on BiPAP, and emergently dialyzed for correction of life-threatening hyperkalemia on 2023.  Nephrology is continuing to follow for assistance with management of acute kidney injury and multiple electrolyte/acid-base abnormalities    Subjective  Patient is resting in bed comfortably.  Undergoing dialysis this morning.  No acute complaints.    Review of Systems  Negative except as stated above    Objective  BP (!) 156/89   Pulse 95   Temp 98.2 °F (36.8 °C) (Oral)   Resp 18   Ht 5'  "7" (1.702 m)   Wt 69.4 kg (153 lb)   SpO2 98%   BMI 23.96 kg/m²     Intake/Output Summary (Last 24 hours) at 6/14/2023 0929  Last data filed at 6/14/2023 0633  Gross per 24 hour   Intake --   Output 1070 ml   Net -1070 ml       Physical Exam  General appearance: Patient is in no acute distress. On oxygen   HEENT: EOMI, no JVD. Neck is supple.  Right IJ dialysis catheter  Chest:   Lung sounds are diminished to auscultation, improved from yesterday.    Heart: S1, S2.   Abdomen:  nondistended. Tight.   :  Deferred  Extremities: generalized upper and lower extremity edema, peripheral pulses are palpable.   Neuro: No focal deficits.  Alert and oriented.    Medications    Current Facility-Administered Medications:     bisacodyL suppository 10 mg, 10 mg, Rectal, Daily PRN, Dominic Armijo MD    calcium carbonate 200 mg calcium (500 mg) chewable tablet 500 mg, 500 mg, Oral, Daily PRN, Dillon Dominguez MD, 500 mg at 06/13/23 0648    calcium gluconate 1 g in NS IVPB (premixed), 1 g, Intravenous, PRN, Lm Yanes MD, Stopped at 06/04/23 0346    calcium gluconate 1 g in NS IVPB (premixed), 2 g, Intravenous, PRN, Lm Yanes MD    calcium gluconate 1 g in NS IVPB (premixed), 3 g, Intravenous, PRN, Lm Yanes MD    dextrose 10% bolus 125 mL 125 mL, 12.5 g, Intravenous, PRN, Alda Valladares MD, Stopped at 06/02/23 2234    dextrose 40 % gel 15,000 mg, 15 g, Oral, PRN, Alda Valladares MD    dextrose 40 % gel 30,000 mg, 30 g, Oral, PRN, Alda Valladares MD    fentaNYL 50 mcg/hr 1 patch, 1 patch, Transdermal, Q72H, Dillon Dominguez MD, 1 patch at 06/12/23 0828    gabapentin capsule 300 mg, 300 mg, Oral, TID, Dillon Dominguez MD, 300 mg at 06/14/23 0807    glucagon (human recombinant) injection 1 mg, 1 mg, Intramuscular, PRN, Alda Valladares MD    heparin (porcine) injection 5,000 Units, 5,000 Units, Subcutaneous, Q8H, Bhakti Beckett MD, 5,000 Units at 06/14/23 0624    HYDROcodone-acetaminophen  mg per tablet 1 tablet, 1 " tablet, Oral, Q6H PRN, Dillon Dominguez MD, 1 tablet at 06/14/23 0807    HYDROmorphone injection 0.2 mg, 0.2 mg, Intravenous, Q4H PRN, Dillon Dominguez MD    insulin regular injection 6.94 Units 0.0694 mL, 0.1 Units/kg, Intravenous, PRN, Dominic Armijo MD, 6.94 Units at 06/02/23 2112    levETIRAcetam tablet 500 mg, 500 mg, Oral, BID, Dillon Dominguez MD, 500 mg at 06/14/23 0807    LIDOcaine 5 % patch 1 patch, 1 patch, Transdermal, Q24H, Dillon Dominguez MD, 1 patch at 06/13/23 1630    melatonin tablet 9 mg, 9 mg, Oral, Nightly PRN, Adolfo Turcios DO, 9 mg at 06/11/23 0211    methocarbamoL tablet 500 mg, 500 mg, Oral, TID PRN, Dillon Dominguez MD, 500 mg at 06/12/23 1836    naloxone 0.4 mg/mL injection 0.02 mg, 0.02 mg, Intravenous, PRN, Alda Valladares MD    polyethylene glycol packet 17 g, 17 g, Oral, BID, Dominic Armijo MD, 17 g at 06/13/23 2018    prochlorperazine tablet 10 mg, 10 mg, Oral, TID PRN, Dillon Dominguez MD, 10 mg at 06/08/23 0813    QUEtiapine tablet 100 mg, 100 mg, Oral, Daily, Dillon Dominguez MD, 100 mg at 06/14/23 0807    sodium chloride 0.9% flush 10 mL, 10 mL, Intravenous, Q12H PRN, Alda Valladares MD    tamsulosin 24 hr capsule 0.4 mg, 0.4 mg, Oral, Daily, Dillon Dominguez MD, 0.4 mg at 06/14/23 0807     Imaging:    CT Abdomen Pelvis  Without Contrast   Final Result      1. Anasarca.  There are pleural effusions and diffuse body wall edema.  Perihilar opacities may be related to pulmonary edema, multifocal pneumonia or ARDS.   2. Persistent left nephrogram compatible with acute renal insufficiency.   3. Limited assessment for gastrointestinal bleed in the absence of intravenous contrast         Electronically signed by: Cindy Davey   Date:    06/13/2023   Time:    16:08      X-Ray Chest 1 View   Final Result      Some improvement in the changes of pulmonary vascular congestion and cardiac decompensation.      No other significant change         Electronically signed by: Emmanuel Waldron   Date:    06/12/2023    Time:    08:49      CT Cervical Spine Without Contrast   Final Result      CT Chest Abdomen Pelvis W W/O Contrast (XPD)   Final Result      X-Ray Chest 1 View   Final Result      Persistent pulmonary edema with slight improvement.         Electronically signed by: Eliazar Gibson MD   Date:    06/10/2023   Time:    10:52      X-Ray Chest 1 View   Final Result      Worsening consolidative changes more so on the left than on the right with some worsening also in the right upper lobe.      No other change         Electronically signed by: Emmanuel Waldron   Date:    06/08/2023   Time:    09:25      X-Ray Chest 1 View   Final Result   .   No significant interval change.         Electronically signed by: Adi Amato   Date:    06/07/2023   Time:    07:26      X-Ray Chest 1 View   Final Result      No significant interval change.         Electronically signed by: Ruiz Elam   Date:    06/06/2023   Time:    06:44      X-Ray Chest 1 View   Final Result      No adverse interval change.  Slight interval improvement.         Electronically signed by: Franco Gonzáles   Date:    06/05/2023   Time:    06:43      X-Ray Chest 1 View   Final Result      Unchanged multifocal right greater than left airspace opacities most consistent with multifocal pneumonia.         Electronically signed by: Eliazar Ronquillo MD   Date:    06/03/2023   Time:    18:58      X-Ray Chest 1 View   Final Result      As above.         Electronically signed by: Franco Gonzáles   Date:    06/03/2023   Time:    11:48      X-Ray Chest 1 View   Final Result      Interval placement of dialysis catheter with concern for developing right-sided effusion.  Hemothorax is not entirely excluded.         Electronically signed by: Eliazar Gibson MD   Date:    06/02/2023   Time:    22:26      CT Chest Abdomen Pelvis Without Contrast (XPD)   Final Result      1. Multifocal lung consolidation, likely pneumonia.   2. Small volume ascites with nonspecific gallbladder wall thickening.   Some of the ascitic fluid in the pelvis appears complex.   3. Mild hepatomegaly.         Electronically signed by: Arley Cuevas   Date:    06/02/2023   Time:    18:40      US Retroperitoneal Limited   Final Result      X-Ray Chest AP Portable   Final Result      Findings concerning for right mid to lower lung infectious process.         Electronically signed by: Franco Gonzáles   Date:    06/02/2023   Time:    13:48            Laboratory Data:  Hematology  Lab Results   Component Value Date    WBC 22.69 (H) 06/13/2023    HGB 10.2 (L) 06/13/2023    HCT 31.0 (L) 06/13/2023     06/13/2023     06/14/2023    K 4.1 06/14/2023    CHLORIDE 105 06/14/2023    CO2 25 06/14/2023    BUN 46.5 (H) 06/14/2023    CREATININE 3.94 (H) 06/14/2023    EGFRNORACEVR 20 06/14/2023    GLUCOSE 83 06/14/2023    CALCIUM 8.1 (L) 06/14/2023    ALKPHOS 34 (L) 06/14/2023    LABPROT 5.1 (L) 06/14/2023    ALBUMIN 1.9 (L) 06/14/2023    AST 49 (H) 06/14/2023    ALT 99 (H) 06/14/2023    MG 1.80 06/14/2023    PHOS 5.1 (H) 06/14/2023     Lab Results   Component Value Date    FERRITIN 153.40 09/14/2022    LDH 2,294 (H) 06/02/2023       Lab Results   Component Value Date    HIV Nonreactive 09/14/2022    HEPBSURFAG Nonreactive 06/02/2023    HEPBSAB Nonreactive 09/14/2022    HEPBCAB Nonreactive 09/14/2022         Impression  Nonoliguric KINGSLEY, 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 at 12:30 pm  Wean oxygen as  tolerated   Continue supportive care    Nate Kramer MD, PGY-3   Nephrology

## 2023-06-15 PROBLEM — J18.9 COMMUNITY ACQUIRED PNEUMONIA OF RIGHT LUNG: Status: ACTIVE | Noted: 2023-06-15

## 2023-06-15 LAB
ALBUMIN SERPL-MCNC: 2 G/DL (ref 3.5–5)
ALBUMIN/GLOB SERPL: 0.6 RATIO (ref 1.1–2)
ALP SERPL-CCNC: 40 UNIT/L (ref 40–150)
ALT SERPL-CCNC: 83 UNIT/L (ref 0–55)
AST SERPL-CCNC: 46 UNIT/L (ref 5–34)
BASOPHILS # BLD AUTO: 0.11 X10(3)/MCL
BASOPHILS NFR BLD AUTO: 0.7 %
BILIRUBIN DIRECT+TOT PNL SERPL-MCNC: 0.3 MG/DL
BUN SERPL-MCNC: 39.9 MG/DL (ref 8.9–20.6)
CALCIUM SERPL-MCNC: 8.3 MG/DL (ref 8.4–10.2)
CHLORIDE SERPL-SCNC: 102 MMOL/L (ref 98–107)
CO2 SERPL-SCNC: 27 MMOL/L (ref 22–29)
CREAT SERPL-MCNC: 3.55 MG/DL (ref 0.73–1.18)
EOSINOPHIL # BLD AUTO: 1.01 X10(3)/MCL (ref 0–0.9)
EOSINOPHIL NFR BLD AUTO: 6.2 %
ERYTHROCYTE [DISTWIDTH] IN BLOOD BY AUTOMATED COUNT: 13 % (ref 11.5–17)
GFR SERPLBLD CREATININE-BSD FMLA CKD-EPI: 22 MLS/MIN/1.73/M2
GLOBULIN SER-MCNC: 3.4 GM/DL (ref 2.4–3.5)
GLUCOSE SERPL-MCNC: 113 MG/DL (ref 74–100)
HCT VFR BLD AUTO: 29.3 % (ref 42–52)
HGB BLD-MCNC: 9.6 G/DL (ref 14–18)
IMM GRANULOCYTES # BLD AUTO: 0.53 X10(3)/MCL (ref 0–0.04)
IMM GRANULOCYTES NFR BLD AUTO: 3.3 %
LYMPHOCYTES # BLD AUTO: 2.32 X10(3)/MCL (ref 0.6–4.6)
LYMPHOCYTES NFR BLD AUTO: 14.3 %
MAGNESIUM SERPL-MCNC: 1.6 MG/DL (ref 1.6–2.6)
MCH RBC QN AUTO: 28.8 PG (ref 27–31)
MCHC RBC AUTO-ENTMCNC: 32.8 G/DL (ref 33–36)
MCV RBC AUTO: 88 FL (ref 80–94)
MONOCYTES # BLD AUTO: 2.26 X10(3)/MCL (ref 0.1–1.3)
MONOCYTES NFR BLD AUTO: 13.9 %
NEUTROPHILS # BLD AUTO: 9.98 X10(3)/MCL (ref 2.1–9.2)
NEUTROPHILS NFR BLD AUTO: 61.6 %
NRBC BLD AUTO-RTO: 0 %
PHOSPHATE SERPL-MCNC: 3.5 MG/DL (ref 2.3–4.7)
PLATELET # BLD AUTO: 321 X10(3)/MCL (ref 130–400)
PMV BLD AUTO: 9 FL (ref 7.4–10.4)
POTASSIUM SERPL-SCNC: 4.1 MMOL/L (ref 3.5–5.1)
PROT SERPL-MCNC: 5.4 GM/DL (ref 6.4–8.3)
RBC # BLD AUTO: 3.33 X10(6)/MCL (ref 4.7–6.1)
SODIUM SERPL-SCNC: 139 MMOL/L (ref 136–145)
WBC # SPEC AUTO: 16.21 X10(3)/MCL (ref 4.5–11.5)

## 2023-06-15 PROCEDURE — 21400001 HC TELEMETRY ROOM

## 2023-06-15 PROCEDURE — 97116 GAIT TRAINING THERAPY: CPT

## 2023-06-15 PROCEDURE — C1752 CATH,HEMODIALYSIS,SHORT-TERM: HCPCS

## 2023-06-15 PROCEDURE — 80053 COMPREHEN METABOLIC PANEL: CPT | Performed by: STUDENT IN AN ORGANIZED HEALTH CARE EDUCATION/TRAINING PROGRAM

## 2023-06-15 PROCEDURE — 63600175 PHARM REV CODE 636 W HCPCS: Performed by: STUDENT IN AN ORGANIZED HEALTH CARE EDUCATION/TRAINING PROGRAM

## 2023-06-15 PROCEDURE — 63600175 PHARM REV CODE 636 W HCPCS

## 2023-06-15 PROCEDURE — 25000003 PHARM REV CODE 250: Performed by: STUDENT IN AN ORGANIZED HEALTH CARE EDUCATION/TRAINING PROGRAM

## 2023-06-15 PROCEDURE — 85025 COMPLETE CBC W/AUTO DIFF WBC: CPT | Performed by: STUDENT IN AN ORGANIZED HEALTH CARE EDUCATION/TRAINING PROGRAM

## 2023-06-15 PROCEDURE — 25000003 PHARM REV CODE 250: Performed by: INTERNAL MEDICINE

## 2023-06-15 PROCEDURE — 94761 N-INVAS EAR/PLS OXIMETRY MLT: CPT

## 2023-06-15 PROCEDURE — 83735 ASSAY OF MAGNESIUM: CPT | Performed by: STUDENT IN AN ORGANIZED HEALTH CARE EDUCATION/TRAINING PROGRAM

## 2023-06-15 PROCEDURE — 84100 ASSAY OF PHOSPHORUS: CPT | Performed by: STUDENT IN AN ORGANIZED HEALTH CARE EDUCATION/TRAINING PROGRAM

## 2023-06-15 RX ORDER — FUROSEMIDE 10 MG/ML
80 INJECTION INTRAMUSCULAR; INTRAVENOUS ONCE
Status: COMPLETED | OUTPATIENT
Start: 2023-06-15 | End: 2023-06-15

## 2023-06-15 RX ORDER — ACETAMINOPHEN 325 MG/1
650 TABLET ORAL EVERY 6 HOURS PRN
Status: DISCONTINUED | OUTPATIENT
Start: 2023-06-15 | End: 2023-06-16 | Stop reason: HOSPADM

## 2023-06-15 RX ORDER — HYDROMORPHONE HYDROCHLORIDE 1 MG/ML
0.2 INJECTION, SOLUTION INTRAMUSCULAR; INTRAVENOUS; SUBCUTANEOUS EVERY 8 HOURS PRN
Status: DISCONTINUED | OUTPATIENT
Start: 2023-06-15 | End: 2023-06-16

## 2023-06-15 RX ORDER — HYDROCODONE BITARTRATE AND ACETAMINOPHEN 10; 325 MG/1; MG/1
1 TABLET ORAL EVERY 8 HOURS PRN
Status: DISCONTINUED | OUTPATIENT
Start: 2023-06-15 | End: 2023-06-16 | Stop reason: HOSPADM

## 2023-06-15 RX ORDER — FENTANYL 50 UG/1
1 PATCH TRANSDERMAL
Status: DISCONTINUED | OUTPATIENT
Start: 2023-06-15 | End: 2023-06-16 | Stop reason: HOSPADM

## 2023-06-15 RX ORDER — MAGNESIUM SULFATE HEPTAHYDRATE 40 MG/ML
2 INJECTION, SOLUTION INTRAVENOUS ONCE
Status: COMPLETED | OUTPATIENT
Start: 2023-06-15 | End: 2023-06-15

## 2023-06-15 RX ADMIN — Medication 9 MG: at 09:06

## 2023-06-15 RX ADMIN — HYDROMORPHONE HYDROCHLORIDE 0.2 MG: 0.5 INJECTION, SOLUTION INTRAMUSCULAR; INTRAVENOUS; SUBCUTANEOUS at 02:06

## 2023-06-15 RX ADMIN — HEPARIN SODIUM 5000 UNITS: 5000 INJECTION, SOLUTION INTRAVENOUS; SUBCUTANEOUS at 02:06

## 2023-06-15 RX ADMIN — LEVETIRACETAM 500 MG: 500 TABLET, FILM COATED ORAL at 09:06

## 2023-06-15 RX ADMIN — HYDROCODONE BITARTRATE AND ACETAMINOPHEN 1 TABLET: 10; 325 TABLET ORAL at 02:06

## 2023-06-15 RX ADMIN — FENTANYL 1 PATCH: 50 PATCH TRANSDERMAL at 07:06

## 2023-06-15 RX ADMIN — FUROSEMIDE 80 MG: 10 INJECTION, SOLUTION INTRAMUSCULAR; INTRAVENOUS at 12:06

## 2023-06-15 RX ADMIN — MAGNESIUM SULFATE HEPTAHYDRATE 2 G: 40 INJECTION, SOLUTION INTRAVENOUS at 12:06

## 2023-06-15 RX ADMIN — METHOCARBAMOL 750 MG: 750 TABLET ORAL at 09:06

## 2023-06-15 RX ADMIN — HYDROMORPHONE HYDROCHLORIDE 0.2 MG: 0.5 INJECTION, SOLUTION INTRAMUSCULAR; INTRAVENOUS; SUBCUTANEOUS at 10:06

## 2023-06-15 RX ADMIN — HEPARIN SODIUM 5000 UNITS: 5000 INJECTION, SOLUTION INTRAVENOUS; SUBCUTANEOUS at 09:06

## 2023-06-15 RX ADMIN — ACETAMINOPHEN 650 MG: 325 TABLET ORAL at 09:06

## 2023-06-15 RX ADMIN — LEVETIRACETAM 500 MG: 500 TABLET, FILM COATED ORAL at 10:06

## 2023-06-15 RX ADMIN — GABAPENTIN 300 MG: 300 CAPSULE ORAL at 10:06

## 2023-06-15 RX ADMIN — GABAPENTIN 300 MG: 300 CAPSULE ORAL at 09:06

## 2023-06-15 RX ADMIN — HYDROCODONE BITARTRATE AND ACETAMINOPHEN 1 TABLET: 10; 325 TABLET ORAL at 10:06

## 2023-06-15 RX ADMIN — TAMSULOSIN HYDROCHLORIDE 0.4 MG: 0.4 CAPSULE ORAL at 10:06

## 2023-06-15 RX ADMIN — HYDROMORPHONE HYDROCHLORIDE 0.2 MG: 0.5 INJECTION, SOLUTION INTRAMUSCULAR; INTRAVENOUS; SUBCUTANEOUS at 05:06

## 2023-06-15 RX ADMIN — HYDROMORPHONE HYDROCHLORIDE 0.2 MG: 0.5 INJECTION, SOLUTION INTRAMUSCULAR; INTRAVENOUS; SUBCUTANEOUS at 12:06

## 2023-06-15 RX ADMIN — HEPARIN SODIUM 5000 UNITS: 5000 INJECTION, SOLUTION INTRAVENOUS; SUBCUTANEOUS at 05:06

## 2023-06-15 RX ADMIN — QUETIAPINE FUMARATE 100 MG: 100 TABLET, FILM COATED ORAL at 10:06

## 2023-06-15 RX ADMIN — GABAPENTIN 300 MG: 300 CAPSULE ORAL at 02:06

## 2023-06-15 RX ADMIN — HYDROCODONE BITARTRATE AND ACETAMINOPHEN 1 TABLET: 10; 325 TABLET ORAL at 06:06

## 2023-06-15 NOTE — PT/OT/SLP PROGRESS
Physical Therapy Treatment    Patient Name:  Ryan Wild   MRN:  [id number]    Recommendations     Discharge Recommendations:  home   Discharge Equipment Recommendations: none   Barriers to discharge:  fall risk, severity of deficits, and decreased endurance    Assessment     Ryan Wild is a 32 y.o. male admitted with a medical diagnosis of Non-traumatic rhabdomyolysis.    Methamphetamine/fentanyl overdose  Acute renal failure secondary to rhabdo secondary to above- Improving  Whole-body pain  Low back pain  Complex abdominal fluid collection  Fluid overloaded  Hyperkalemia- Resolved with dialysis  HTN 2/2 above  Pneumonia-community-acquired with possible component of aspiration.  Leukocytosis  Sepsis secondary to above- Resolved  Shock 2/2 above - Resolved  Intracardiac shunting  NSTEMI type 2, however given high peak concern for type 1  Seizure disorder  History of polysubstance use disorder  History of anabolic steroid abuse           Patient Active Problem List   Diagnosis    Hepatitis C virus infection without hepatic coma    Other bipolar disorder    Opioid use disorder, moderate, in sustained remission    Amphetamine use disorder, severe, in sustained remission    KINGSLEY (acute kidney injury)    NSTEMI (non-ST elevated myocardial infarction)    Non-traumatic rhabdomyolysis    Shock    Solitary kidney, congenital    Hyperkalemia    Hypermagnesemia    Elevated levels of transaminase & lactic acid dehydrogenase    Seizure disorder     He presents with the following impairments/functional limitations:  impaired endurance, gait instability, impaired balance, decreased safety awareness.    Rehab Prognosis: Good.    Patient would benefit from continued skilled acute PT services to: address above listed impairments/functional limitations; receive patient/caregiver education; reduce fall risk; and maximize independency/safety with functional mobility.    -continued: ambulation, with progression of gait  distance/frequency/duration and speed, as tolerated/appropriate, with assistance and supervision  -will discharge PT SERVICES once STG's met by patient    Recent Surgery: Procedure(s) (LRB):  Insertion, Catheter, Central Venous, Hemodialysis (N/A)      Plan     During this hospitalization, patient to be seen 3 x/week to address the identified impairments/functional limitations via gait training, therapeutic activities, therapeutic exercises and progress toward the established goals.    Plan of Care Expires:  07/08/23    Subjective     Communicated with patient's nurse Amauri prior to session.    Patient agreeable to participate in treatment session.    Chief Complaint: back pain  Patient/Family Comments/goals: home  Pain/Comfort:  Pain Rating 1: 7/10  Location - Side 1: Bilateral  Location - Orientation 1: lower  Location 1: back  Pain Addressed 1: Nurse notified, Cessation of Activity, Distraction, Reposition  Pain Rating Post-Intervention 1: 9/10    Objective     Patient found supine in bed, with HOB elevated, and bed rails up bilateral HOB with telemetry, peripheral IV (HD Cath)  upon PT entry to room.    General Precautions: Standard, fall, hearing impaired, seizure   Orthopedic Precautions:N/A   Braces: N/A  Respiratory Status: room air    Functional Mobility:    Bed Mobility:  Rolling Left: independence  Rolling Right: independence  Scooting: independence  Supine to Sit: independence  Sit to Supine: independence  with no cues required    Transfers:  Sit to Stand: modified independence with no assistive device  with no cues required    Gait:  Patient ambulated 260ft  Sitting rest x3 minutes  Patient ambulated 260ft  With no assistive device and modified independence.  Patient demonstrates  bilat LE hip external rotation, steady gait, symmetrical step length, upright posture, reciprocal arm swing, no loss of balance, no mis-steps, and wide base of support. .    Other Mobility:  not  assessed    Balance:  Sit  Patient demonstrated static balance on level surface with independence with no verbal cues.  Patient demonstrated dynamic balance on level surface with independence with no verbal cues during moderate excursions.  Stand  Patient demonstrated static balance on level surface  using no device with independence with no verbal cues.    Patient left supine in bed, with HOB elevated, and bed rails up bilateral HOB with all lines intact, call button in reach, tray table at bedside, bedside commode at bedside, and pt's nurse Amauri notified.    Goals     Multidisciplinary Problems       Physical Therapy Goals          Problem: Physical Therapy    Goal Priority Disciplines Outcome Goal Variances Interventions   Physical Therapy Goal     PT, PT/OT Ongoing, Progressing     Description: Goals to be met by: DISCHARGE     Patient will increase functional independence with mobility by performin. Supine to sit and sit to supine with [place] - MET 2023  2. Sit to stand transfer with [place] - ONGOING  3. Gait  x 500 feet with [place] using No Assistive Device - ONGOING                     Time Tracking     PT Received On: 06/15/23  PT Start Time: 0825     PT Stop Time: 0841  PT Total Time (min): 16 min     Billable Minutes: Gait Training 16    06/15/2023

## 2023-06-15 NOTE — PLAN OF CARE
Plan of care    Patient consented for tunneled HD catheter placement tomorrow, 6/16. Risks, benefits, and alternatives were discussed with patient and all questions answered.     Per nephrology, plans for continued Monday, Wednesday, Friday HD schedule. Set up for outpatient dialysis at [facility] M/W/ at 12:30pm.     Please keep patient NPO at midnight in preparation for OR tomorrow.     Esme Harley MD   [employer] General Surgery, PGY-1

## 2023-06-15 NOTE — PROGRESS NOTES
[facility]  Nephrology Progress Note    Patient Name: Ryan Wild  Age: 32 y.o.  : 1990  MRN: [id number]  Admission Date: 2023    Chief complaint: Back Pain, Hypotension, and Hearing Problem (PT REPORTS WAKING UP W LOWER BACK PAIN, WEAKNESS, SOB AND HEARING LOSS. 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  DENIES DRUG USE. HX OF SEIZURES, NON COMPLIANT W MEDS > 1 MONTH.  .  EKG OBTAINED. )      Hospital course  Ryan Wild is a 32 y.o. White male with past medical history of seizures, polysubstance abuse, treated hepatitis-C, and solitary kidney.  Patient presented to the emergency department on 2023 with complaints of back pain, shortness breath, and decreased urinary frequency.  Patient believes that symptoms were precipitated by the fact that he has been working outside in the heat for 12 hours. He admitted to polysubstance abuse, including intravenous drug use, and anabolic steroid use.  UDS was positive for methamphetamines and fentanyl. In the emergency department, patient was tachycardic, hypotensive, and hypoxemic.  Laboratory results were remarkable for severe leukocytosis (WBC 41), azotemia, metabolic acidosis, hyperkalemia (serum potassium 6.9), transaminitis, hyperphosphatemia, elevated troponin, and elevated lactic acid level.  CK was 37,420 units per L.  Patient was admitted to the intensive care unit, placed on BiPAP, and emergently dialyzed for correction of life-threatening hyperkalemia on 2023.  Nephrology is continuing to follow for assistance with management of acute kidney injury and multiple electrolyte/acid-base abnormalities    Subjective  Patient is resting in bed.  On room air.  Endorsing pain after working with therapy.  Refused lab draws this morning.  States he made significant urine after receiving 80 mg IV Lasix yesterday.  Underwent dialysis yesterday with 1500 cc of  "ultrafiltration.    Review of Systems  Negative except as stated above    Objective  BP (!) 163/95   Pulse 93   Temp 97.9 °F (36.6 °C) (Oral)   Resp 20   Ht 5' 7" (1.702 m)   Wt 69.4 kg (153 lb)   SpO2 95%   BMI 23.96 kg/m²     Intake/Output Summary (Last 24 hours) at 6/15/2023 1007  Last data filed at 6/15/2023 0549  Gross per 24 hour   Intake 480 ml   Output 2200 ml   Net -1720 ml       Physical Exam  General appearance: Patient is in no acute distress. On RA  HEENT: EOMI, no JVD. Neck is supple.  Right IJ dialysis catheter  Chest:   Lung sounds are diminished to auscultation, improved from yesterday.    Heart: S1, S2.   Abdomen:  nondistended. Tight.  Nontender.  Bowel sounds present.  Extremities: generalized upper and lower extremity edema, mildly improved from yesterday   Neuro: No focal deficits.  Alert and oriented.    Medications    Current Facility-Administered Medications:     acetaminophen tablet 650 mg, 650 mg, Oral, Q6H PRN, Dillon Dominguez MD    bisacodyL suppository 10 mg, 10 mg, Rectal, Daily PRN, Dominic Armijo MD    calcium carbonate 200 mg calcium (500 mg) chewable tablet 500 mg, 500 mg, Oral, Daily PRN, Dillon Dominguez MD, 500 mg at 06/13/23 0648    calcium gluconate 1 g in NS IVPB (premixed), 1 g, Intravenous, PRN, Lm Yanes MD, Stopped at 06/04/23 0346    calcium gluconate 1 g in NS IVPB (premixed), 2 g, Intravenous, PRN, Lm Yanes MD    calcium gluconate 1 g in NS IVPB (premixed), 3 g, Intravenous, PRN, Lm Yanes MD    dextrose 10% bolus 125 mL 125 mL, 12.5 g, Intravenous, PRN, Alda Valladares MD, Stopped at 06/02/23 2234    dextrose 40 % gel 15,000 mg, 15 g, Oral, PRN, Alda Valladares MD    dextrose 40 % gel 30,000 mg, 30 g, Oral, PRN, Alda Valladares MD    fentaNYL 50 mcg/hr 1 patch, 1 patch, Transdermal, Q72H, Dillon Dominguez MD, 1 patch at 06/15/23 0709    gabapentin capsule 300 mg, 300 mg, Oral, TID, Dillon Dominguez MD, 300 mg at 06/14/23 2000    glucagon (human " recombinant) injection 1 mg, 1 mg, Intramuscular, PRN, Alda Valladares MD    heparin (porcine) injection 5,000 Units, 5,000 Units, Subcutaneous, Q8H, Bhakti Beckett MD, 5,000 Units at 06/15/23 0555    HYDROcodone-acetaminophen  mg per tablet 1 tablet, 1 tablet, Oral, Q8H PRN, Dillon Dominguez MD    HYDROmorphone injection 0.2 mg, 0.2 mg, Intravenous, Q8H PRN, Dillon Dominguez MD    insulin regular injection 6.94 Units 0.0694 mL, 0.1 Units/kg, Intravenous, PRN, Dominic Armijo MD, 6.94 Units at 06/02/23 2112    levETIRAcetam tablet 500 mg, 500 mg, Oral, BID, Dillon Dominguez MD, 500 mg at 06/14/23 2001    LIDOcaine 5 % patch 1 patch, 1 patch, Transdermal, Q24H, Dillon Dominguez MD, 1 patch at 06/14/23 1653    melatonin tablet 9 mg, 9 mg, Oral, Nightly PRN, Adolfo Turcios DO, 9 mg at 06/14/23 2000    methocarbamoL tablet 750 mg, 750 mg, Oral, TID PRN, Dillon Dominguez MD, 750 mg at 06/14/23 2000    naloxone 0.4 mg/mL injection 0.02 mg, 0.02 mg, Intravenous, PRN, Alda Valladares MD    polyethylene glycol packet 17 g, 17 g, Oral, BID, Dominic Armijo MD, 17 g at 06/14/23 2000    prochlorperazine tablet 10 mg, 10 mg, Oral, TID PRN, Dillon Dominguez MD, 10 mg at 06/08/23 0813    QUEtiapine tablet 100 mg, 100 mg, Oral, Daily, Dillon Dominguez MD, 100 mg at 06/14/23 0807    sodium chloride 0.9% flush 10 mL, 10 mL, Intravenous, Q12H PRN, Alda Valladares MD    tamsulosin 24 hr capsule 0.4 mg, 0.4 mg, Oral, Daily, Dillon Dominguez MD, 0.4 mg at 06/14/23 0807     Imaging:    CT Abdomen Pelvis  Without Contrast   Final Result      1. Anasarca.  There are pleural effusions and diffuse body wall edema.  Perihilar opacities may be related to pulmonary edema, multifocal pneumonia or ARDS.   2. Persistent left nephrogram compatible with acute renal insufficiency.   3. Limited assessment for gastrointestinal bleed in the absence of intravenous contrast         Electronically signed by: Cindy Davey   Date:    06/13/2023   Time:    16:08      X-Ray Chest  1 View   Final Result      Some improvement in the changes of pulmonary vascular congestion and cardiac decompensation.      No other significant change         Electronically signed by: Emmanuel Waldron   Date:    06/12/2023   Time:    08:49      CT Cervical Spine Without Contrast   Final Result      CT Chest Abdomen Pelvis W W/O Contrast (XPD)   Final Result      X-Ray Chest 1 View   Final Result      Persistent pulmonary edema with slight improvement.         Electronically signed by: Eliazar Gibson MD   Date:    06/10/2023   Time:    10:52      X-Ray Chest 1 View   Final Result      Worsening consolidative changes more so on the left than on the right with some worsening also in the right upper lobe.      No other change         Electronically signed by: Emmanuel Waldron   Date:    06/08/2023   Time:    09:25      X-Ray Chest 1 View   Final Result   .   No significant interval change.         Electronically signed by: Adi Amato   Date:    06/07/2023   Time:    07:26      X-Ray Chest 1 View   Final Result      No significant interval change.         Electronically signed by: Ruiz Elam   Date:    06/06/2023   Time:    06:44      X-Ray Chest 1 View   Final Result      No adverse interval change.  Slight interval improvement.         Electronically signed by: Franco Gonzáles   Date:    06/05/2023   Time:    06:43      X-Ray Chest 1 View   Final Result      Unchanged multifocal right greater than left airspace opacities most consistent with multifocal pneumonia.         Electronically signed by: Eliazar Ronquillo MD   Date:    06/03/2023   Time:    18:58      X-Ray Chest 1 View   Final Result      As above.         Electronically signed by: Franco Gonzáles   Date:    06/03/2023   Time:    11:48      X-Ray Chest 1 View   Final Result      Interval placement of dialysis catheter with concern for developing right-sided effusion.  Hemothorax is not entirely excluded.         Electronically signed by: Eliazar Gibson MD    Date:    06/02/2023   Time:    22:26      CT Chest Abdomen Pelvis Without Contrast (XPD)   Final Result      1. Multifocal lung consolidation, likely pneumonia.   2. Small volume ascites with nonspecific gallbladder wall thickening.  Some of the ascitic fluid in the pelvis appears complex.   3. Mild hepatomegaly.         Electronically signed by: Arley Cuevas   Date:    06/02/2023   Time:    18:40      US Retroperitoneal Limited   Final Result      X-Ray Chest AP Portable   Final Result      Findings concerning for right mid to lower lung infectious process.         Electronically signed by: Franco Gonzáles   Date:    06/02/2023   Time:    13:48            Laboratory Data:  Hematology  Lab Results   Component Value Date    WBC 16.21 (H) 06/15/2023    HGB 9.6 (L) 06/15/2023    HCT 29.3 (L) 06/15/2023     06/15/2023     06/14/2023    K 4.1 06/14/2023    CHLORIDE 105 06/14/2023    CO2 25 06/14/2023    BUN 46.5 (H) 06/14/2023    CREATININE 3.94 (H) 06/14/2023    EGFRNORACEVR 20 06/14/2023    GLUCOSE 83 06/14/2023    CALCIUM 8.1 (L) 06/14/2023    ALKPHOS 34 (L) 06/14/2023    LABPROT 5.1 (L) 06/14/2023    ALBUMIN 1.9 (L) 06/14/2023    AST 49 (H) 06/14/2023    ALT 99 (H) 06/14/2023    MG 1.80 06/14/2023    PHOS 5.1 (H) 06/14/2023     Lab Results   Component Value Date    FERRITIN 153.40 09/14/2022    LDH 2,294 (H) 06/02/2023       Lab Results   Component Value Date    HIV Nonreactive 09/14/2022    HEPBSURFAG Nonreactive 06/02/2023    HEPBSAB Nonreactive 09/14/2022    HEPBCAB Nonreactive 09/14/2022         Impression  Nonoliguric KINGSLEY, 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  Solitary kidney  Anasarca/pleural effusion  Rhabdomyolysis - resolved  Transaminitis, stable - improving   NSTEMI, undifferentiated  History of seizure disorder   Polysubstance abuse with amphetamine/fentanyl  Anabolic steroid use  Treated hepatitis-C    Plan  Will continue hemodialysis per Monday, Wednesday, Friday schedule  while hospitalized   Excellent urine output with Lasix yesterday  Will provide another 80 mg IV Lasix x1 dose today and re-evaluate kidney function tomorrow morning prior to scheduled tunneled catheter insertion  Replacing magnesium  Outpatient dialysis at [facility] at 12:30 pm  Continue supportive care    Nate Kramer MD, PGY-3   Nephrology

## 2023-06-15 NOTE — PROGRESS NOTES
[facility]    Progress Note      Patient Name: Ryan Wild  MRN: [id number]  Admission Date: 6/2/2023  Attending Physician: Naida Egan MD   Primary Care Provider: Adilene Dillon NP    Patient information was obtained from patient and ER records.     Subjective:     Principal Problem:Non-traumatic rhabdomyolysis    Chief Complaint:   Chief Complaint   Patient presents with    Back Pain    Hypotension    Hearing Problem     PT REPORTS WAKING UP W LOWER BACK PAIN, WEAKNESS, SOB AND HEARING LOSS. 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  DENIES DRUG USE. HX OF SEIZURES, NON COMPLIANT W MEDS > 1 MONTH.  .  EKG OBTAINED.         HPI: Ryan Wild is a 32 y.o. male with history of seizures (off Keppra), drug use, hepatitis C (treated), and solitary kidney who presented to the ED on 6/2/2023  with a primary complaint of back pain. Patient is a  who had worked a 12 hour shift out in the sun the day prior. States that he went to bed feeling great, without any complaints. Then he woke up late for work day of admission and admits to not remembering much of what happened afterwards. His coworkers brought him to the ED. Admits to severe back pain that is aggravated by movement, weakness, shortness of breath. Also has not urinated all day. Denies chest pain, palpitations, headache, nausea, vomiting, abdominal pain, fevers. Last IV drug use 1 year ago, but snorted meth 2 days ago. Also uses IM steroids, last injection last night. Has not taken his home Keppra for the past few months; his last seizure was on 8/2022.      In the ED, patient presented hypotensive, tachycardic, SpO2 89% on RA. He was placed on bipap. Vasopressors were started when patient continued to be hypotensive with IVF. Labs significant for WBC 42.1, K 6.9, CO2 14, Cr 3.77, , . CPK 37k. LDH 2,2k. CBG normal. Troponin 1.878,  EKG no ischemic changes. Lactic 8.0. CXR  with increased interstitial markings in the right greater than left lung. Bedside Echo no abnormalities. Pt was given Vanc/Zosyn x1, and 4L bolus NS total. Medicine was called to admit patient for septic shock with multiorgan dysfunction.    Hospital course:  06/03/2023: Patient started on HD emergently, Levophed, Precedex, BiPAP, good response to Lasix  06/04/2023:  Recommend repeat HD today, nephro to see, off Levophed, continue Precedex, BiPAP, consider proning after dialysis, if no dialysis planned we will give repeat dose of Lasix.  6.5.23: No acute events overnight, patient remains on Precedex at max. Concerned about his status and why he is still in-house. Unable to tolerate vapotherm or proning for any appreciable amount of time. Currently being dialyzed.   6.6.23: NAEON. HD yesterday. Removed 0.5-1L ultrafiltrate. Well tolerated.  Able to wean off BIPAP to CPAP. HFNC at supper time and able to tolerate small meal. Back on CPAP at night. Still requiring sedation with Precedex. Currently on Vapotherm at 100% O2.  6.7.23: Desatted to low 79-80s on vapotherm. Initially refused to be placed back CPAP. However, was reasonable to be return to it after speaking with ICU nursing staff. Has been irritable and still reports he is in pain. Received Morphine x1. Still on Precedex 1.4mcg. Appears untouched. Has Net positive fluid balance.   6.8.23: Dialyzed yesterday morning. Removed approx 1.5L. Had an additional 850mL in urinary output. Tolerated Vapotherm. O2 sats remained in <90s.   CK improved prior to dialyzing. He still reports significant back pain requiring scheduled dilaudid. No longer on Precedex.   Has an appetite, but not eating much.   6.9.23: Downgraded to tele. Feels his breathing is better, however is more swollen than normal. Complaints of low back and bilateral limb pain. Requesting Dilaudid every three hours. Appetite improving. Urinating, however states he  doesn't know when happening.  6.10.23: NAEO.  Tolerating HFNC.  Patient continues to report whole-body pain which is worse in his back.  His oxygen requirements have not significantly decreased.  We will have the patient work with PT/OT.  CK continues to downtrend, does have persistent leukocytosis.  Cultures have grown nothing.  He has received 8 days of pneumonia coverage.  6.11.23: NAEON. VSS. Received HD one day ago. >2L UF removed. Still feeling swollen. Oxygen requirements have not decreased significantly. Patient participated, but did not tolerate PT due to low back and leg pain. Reports pain not well controlled with Dilaudid and Norco 10. Appetite improving.  6.12.23: NAEON. VSS. Saturating well on Vapotherm. Still with visible swelling to upper extremities and penis. Lower extremities have improved. Back pain with some improvement.  Swelling also improved.  Added Robaxin (renal dose) to pain regimen. No other issues or concerns at this time.  6.13.23: NAEON. VSS. Off oxygen. Patient eating and drinking without difficulty. Pain still significant in lower back. Currently 5/10. Fentanyl patch helping. Weaning pain medications. He states that he would like to be discharged to outpatient PT and to [facility] once ready. Understands plan for outpatient HD. Tunnel cath planned for this Friday 6.16.23    6.15.23: No acute events overnight.  No longer requiring supplemental oxygen.  Saturations in the high 90s.  Developed superficial venous thromboses in upper extremities.  No changes in anticoagulation.  Currently being dialyzed.  Low back pain and generalized swelling improving.  Reports being in no acute distress.    Interval history:  No acute events overnight.  Patient remained off supplemental oxygen.  Saturations 95-96%.  HD pulled of 1.5L. Received Lasix per Nephrology.  Diuresing well, yet still has significant swelling compared to admission. He reports being able to sleep comfortably, without any  shortness of breath.  Still with back pain and requiring pain medication.  Is compliant with physical therapy.  Appetite significantly improved.      Past Medical History:   Diagnosis Date    Depression     Opioid abuse     Seizures     Solitary kidney, congenital     Unspecified viral hepatitis C without hepatic coma        Past Surgical History:   Procedure Laterality Date    TONSILLECTOMY         Review of patient's allergies indicates:  No Known Allergies    No current facility-administered medications on file prior to encounter.     Current Outpatient Medications on File Prior to Encounter   Medication Sig    albuterol (PROVENTIL/VENTOLIN HFA) 90 mcg/actuation inhaler INHALE 2 PUFFS BY MOUTH EVERY 6 HOURS AS NEEDED FOR SHORTNESS OF BREATH OR WHEEZING    fluticasone propionate (FLONASE) 50 mcg/actuation nasal spray SHAKE LIQUID AND USE 2 SPRAYS IN EACH NOSTRIL DAILY    levETIRAcetam (KEPPRA) 500 MG Tab Take 1 tablet by mouth 2 (two) times daily.    QUEtiapine (SEROQUEL) 100 MG Tab Take 1 tablet (100 mg total) by mouth nightly.    valACYclovir (VALTREX) 1000 MG tablet Take 1,000 mg by mouth 3 (three) times daily.    venlafaxine (EFFEXOR-XR) 37.5 MG 24 hr capsule Take 37.5 mg by mouth every morning.     Family History       Problem Relation (Age of Onset)    Cerebral aneurysm Father          Tobacco Use    Smoking status: Every Day     Types: Vaping with nicotine    Smokeless tobacco: Current   Substance and Sexual Activity    Alcohol use: Not Currently    Drug use: Not Currently     Types: Heroin, Methamphetamines     Comment: 3 years sober    Sexual activity: Yes     Partners: Female     Review of Systems   Constitutional:  Negative for chills and fever.   Respiratory:  Negative for shortness of breath.    Gastrointestinal:  Negative for abdominal pain, nausea and vomiting.   Genitourinary:  Negative for difficulty urinating, dysuria, penile swelling and scrotal swelling.   Musculoskeletal:  Positive for back  pain.   Neurological:  Negative for weakness and headaches.   Psychiatric/Behavioral:  Negative for hallucinations. The patient is not nervous/anxious.    Objective:     Vital Signs (Most Recent):  Temp: 97.9 °F (36.6 °C) (06/15/23 075)  Pulse: 93 (06/15/23 075)  Resp: 20 (06/15/23 0709)  BP: (!) 163/95 (06/15/23 075)  SpO2: 95 % (06/15/23 075) Vital Signs (24h Range):  Temp:  [97.9 °F (36.6 °C)-100.5 °F (38.1 °C)] 97.9 °F (36.6 °C)  Pulse:  [89-99] 93  Resp:  [14-20] 20  SpO2:  [91 %-98 %] 95 %  BP: (151-168)/(84-95) 163/95     Weight: 69.4 kg (153 lb)  Body mass index is 23.96 kg/m².    Physical Exam  Constitutional:       General: He is awake. He is not in acute distress.     Appearance: Normal appearance. He is normal weight.      Comments: Lying in bed on Vapotherm. Awake and alert.   Eyes:      Extraocular Movements: Extraocular movements intact.      Conjunctiva/sclera: Conjunctivae normal.   Cardiovascular:      Rate and Rhythm: Normal rate and regular rhythm.      Pulses: Normal pulses.           Dorsalis pedis pulses are 2+ on the right side and 2+ on the left side.      Heart sounds: Normal heart sounds.      Arteriovenous access: Right and left arteriovenous access is present.  Pulmonary:      Effort: Pulmonary effort is normal.      Breath sounds: Normal air entry. No wheezing.   Chest:      Chest wall: No tenderness.   Abdominal:      General: Abdomen is flat. Bowel sounds are normal.      Palpations: Abdomen is soft.      Tenderness: There is no abdominal tenderness. There is no guarding.   Musculoskeletal:         General: No tenderness. Normal range of motion.      Right forearm: Swelling present.      Left forearm: Swelling present.      Lumbar back: Deformity present. No signs of trauma or lacerations.      Right knee: Normal.      Left knee: Normal.      Right lower le+ Pitting Edema present.      Left lower le+ Pitting Edema present.      Right foot: Swelling present. No deformity.       Left foot: Swelling present.   Feet:      Right foot:      Skin integrity: Warmth present.      Left foot:      Skin integrity: Skin integrity normal.   Skin:     General: Skin is warm.      Capillary Refill: Capillary refill takes 2 to 3 seconds.      Coloration: Skin is not cyanotic, jaundiced or mottled.   Neurological:      Mental Status: He is alert and oriented to person, place, and time.      GCS: GCS eye subscore is 4. GCS verbal subscore is 5. GCS motor subscore is 6.   Psychiatric:         Mood and Affect: Mood normal.         Behavior: Behavior is cooperative.          Significant Labs: All pertinent labs within the past 24 hours have been reviewed.    Recent Labs     06/15/23  0322   WBC 16.21*       Significant Imaging: I have reviewed all pertinent imaging results/findings within the past 24 hours.    Imaging Results              CT Chest Abdomen Pelvis Without Contrast (XPD) (Final result)  Result time 06/02/23 18:40:12      Final result by Arley Cuevas MD (06/02/23 18:40:12)                   Impression:      1. Multifocal lung consolidation, likely pneumonia.  2. Small volume ascites with nonspecific gallbladder wall thickening.  Some of the ascitic fluid in the pelvis appears complex.  3. Mild hepatomegaly.      Electronically signed by: Arley Cuevas  Date:    06/02/2023  Time:    18:40               Narrative:    EXAMINATION:  CT CHEST ABDOMEN PELVIS WITHOUT CONTRAST(XPD)    CLINICAL HISTORY:  Sepsis; Sepsis, unspecified organism    TECHNIQUE:  CT imaging from the chest through the pelvis without IV contrast. Axial, coronal and sagittal reformatted images reviewed. Dose length product 263 mGycm. Automatic exposure control, adjustment of mA/kV or iterative reconstruction technique used to limit radiation dose.    COMPARISON:  No relevant comparison studies available at the time of dictation.    FINDINGS:  Assessment of the visceral organs and vasculature is limited by the lack of IV  contrast.    Lung and large airways: Multifocal consolidation in both lungs, greatest in the lower lobes.    Pleura: No significant pleural fluid.    Mediastinum and nae: Normal heart size.  No pathologically enlarged lymph node identified.    Chest wall/axilla and lower neck: No significant findings.    Liver/biliary: Mild hepatomegaly.  Suspected gallbladder wall thickening.  No biliary dilatation.    Pancreas: Normal.    Spleen: Normal.    Adrenals: Normal.    Genitourinary: Right kidney not identified.  No urinary stone or hydronephrosis on the left.  Normal bladder.    Stomach/bowel: No bowel obstruction.    Abdominal/pelvic lymph nodes and peritoneum: No pathologically enlarged lymph node identified. Small volume ascites with increased fluid density in portions of the pelvis.    Abdominal and pelvic vasculature: Normal abdominal aortic caliber.    Abdominal wall: High-riding right testicle along the right inguinal canal.    Musculoskeletal: No acute osseous findings.                                       US Retroperitoneal Limited (Final result)  Result time 06/02/23 16:31:06      Final result by Familia John MD (06/02/23 16:31:06)                   Narrative:    EXAMINATION  US RETROPERITONEAL LIMITED    CLINICAL HISTORY  lalita;    TECHNIQUE  Multiplanar grayscale sonographic images were obtained of the retroperitoneum.    COMPARISON  None available at the time of initial interpretation.    FINDINGS  Exam quality: adequate for evaluation    Kidneys: The right kidney is not visualized, with no focal or otherwise suspicious findings of the ipsilateral retroperitoneal region.  The left kidney measures 14.1 cm x 7.2 cm x 6.3 cm.  The left renal parenchyma is homogeneous and normal by sonographic appearance, with smooth marginated cortex.  No masses or complex cysts are appreciated.  No collecting system dilatation.  No shadowing calcification is identified.  No perinephric collection or free abdominal  fluid.    Miscellaneous: There is incidentally appreciated fluid partially visualized through the left abdominal cavity.  Scattered internal punctate echogenic foci may represent associated debris.    IMPRESSION  1. Solitary left kidney, without sonographic findings of acute urologic abnormality.  2. Partially visualized fluid with echogenic debris at the left hemiabdomen is nonspecific; differential includes complex ascites or collection, versus distended stomach/bowel.      Electronically signed by: Familia John  Date:    06/02/2023  Time:    16:31                                     X-Ray Chest AP Portable (Final result)  Result time 06/02/23 13:48:55      Final result by Franco Gonzáles MD (06/02/23 13:48:55)                   Impression:      Findings concerning for right mid to lower lung infectious process.      Electronically signed by: Franco Gonzáles  Date:    06/02/2023  Time:    13:48               Narrative:    EXAMINATION:  XR CHEST AP PORTABLE    CLINICAL HISTORY:  Sepsis;    TECHNIQUE:  Single view of the chest    COMPARISON:  No prior imaging available for comparison.    FINDINGS:  Increased interstitial markings in the right greater than left lung.    The cardiomediastinal silhouette is within normal limits.    No acute osseous abnormality.                                      CXR 6.12.23 My read:  Portable chest x-ray.  Patient appears rotated to the right slightly.  No bony deformities or fractures appreciated.  Cardiac silhouette appears to be normal but partially obscured inferiorly.  Bilateral consolidations appear improved, with less focal density and less his at the right lung base.    CV US VEIN MAPPING 6.13.23  The right upper extremity was positive for occlusive superficial vein thrombus in the median cubital vein at the antecubital fossa.   The right cephalic vein is of small caliber.   The right basilic vein is greater than 0.49 cm in diameter.     The left upper extremity was positive  for non-occlusive acute superficial vein thrombus in the median cubital vein at the antecubital fossa.   The left cephalic vein is of small caliber.   The left basilic vein is greater than 0.31 cm in diameter.   The left proximal radial artery was obscured by IV bandaging.   Assessment/Plan:     Superficial venous thromboses bilateral upper arms  - Therapeutic heparin 5Ku q8h. He was already on prophylactic dose at q12h. No indication for more aggressive anticoagulation.  - Continue to monitor.    Acute renal failure secondary to rhabdo secondary to above  Fluid overloaded- Improving  - Given Lasix 80mg IV.  -HD removed 1.5L. Currently -1720cc/24hr  -Tunnel Cath scheduled for Friday 6.16.23  -Flomax daily  - Accepted to outpatient HD.    Leukocytosis  -WBCs downtrending.  -Has remained afebrile.  -repeat blood cultures show no growth at 48h.    Low back pain  - PT/OT following.   - now on multimodal pain control. Lowest dose Dilaudid. Weaning to q8h.  -Added po Tylenol.    Hypoxia- Resolved  -Stable off supplemental O2.   -Monitoring saturations while ambulating.    Intracardiac shunting  NSTEMI type 2, however given high peak concern for type 1  -Echo 50%EF (normal). Positive for intracardiac shunt. Cardiology following. Possible EUGENIA once hemodynamically stable.     Seizure disorder  - Continue Keppra 500mg po BID.    History of polysubstance use disorder  History of anabolic steroid abuse  - Case management assisting with  rehab/substance abuse assistance.  -Denied [facility] due to outpatient HD needs.  -Will continue referral process.        CODE STATUS: FULL   Access: HD line, art line, PIV  Antibiotics: None  Diet: Renal on Dialysis  DVT Prophylaxis: Heparin 5K units BID.  Fluids: None.    GI Prophylaxis: Compazine PRN.  Oxygen requirements: None.  Pain: IV Dilaudid 0.2mg q8hPRN; PO Norco 10 q8hPRN, Robaxin 750 mg t.i.d. (renal dose), fentanyl patch 50 mcg p.r.n; lidocaine patch..  Sedation:  None        Disposition: Continue to de-escalate pain medications. Aggressive PT/OT. Continue Oxygen status monitoring. HD per nephrology recs. Tunnel cath for HD 6.16.23.    Dillon Dominguez MD  Department of Hospital Medicine

## 2023-06-15 NOTE — PLAN OF CARE
Problem: Infection  Goal: Absence of Infection Signs and Symptoms  Outcome: Ongoing, Progressing     Problem: Adult Inpatient Plan of Care  Goal: Plan of Care Review  Outcome: Ongoing, Progressing  Goal: Patient-Specific Goal (Individualized)  Outcome: Ongoing, Progressing  Goal: Absence of Hospital-Acquired Illness or Injury  Outcome: Ongoing, Progressing  Goal: Optimal Comfort and Wellbeing  Outcome: Ongoing, Progressing  Goal: Readiness for Transition of Care  Outcome: Ongoing, Progressing     Problem: Fluid and Electrolyte Imbalance (Acute Kidney Injury/Impairment)  Goal: Fluid and Electrolyte Balance  Outcome: Ongoing, Progressing     Problem: Oral Intake Inadequate (Acute Kidney Injury/Impairment)  Goal: Optimal Nutrition Intake  Outcome: Ongoing, Progressing     Problem: Renal Function Impairment (Acute Kidney Injury/Impairment)  Goal: Effective Renal Function  Outcome: Ongoing, Progressing     Problem: Device-Related Complication Risk (Hemodialysis)  Goal: Safe, Effective Therapy Delivery  Outcome: Ongoing, Progressing     Problem: Hemodynamic Instability (Hemodialysis)  Goal: Effective Tissue Perfusion  Outcome: Ongoing, Progressing     Problem: Infection (Hemodialysis)  Goal: Absence of Infection Signs and Symptoms  Outcome: Ongoing, Progressing     Problem: Skin Injury Risk Increased  Goal: Skin Health and Integrity  Outcome: Ongoing, Progressing     Problem: Pain Acute  Goal: Acceptable Pain Control and Functional Ability  Outcome: Ongoing, Progressing     Problem: Adjustment to Illness (Sepsis/Septic Shock)  Goal: Optimal Coping  Outcome: Ongoing, Progressing     Problem: Bleeding (Sepsis/Septic Shock)  Goal: Absence of Bleeding  Outcome: Ongoing, Progressing     Problem: Glycemic Control Impaired (Sepsis/Septic Shock)  Goal: Blood Glucose Level Within Desired Range  Outcome: Ongoing, Progressing     Problem: Infection Progression (Sepsis/Septic Shock)  Goal: Absence of Infection Signs and  Symptoms  Outcome: Ongoing, Progressing     Problem: Nutrition Impaired (Sepsis/Septic Shock)  Goal: Optimal Nutrition Intake  Outcome: Ongoing, Progressing     Problem: Fall Injury Risk  Goal: Absence of Fall and Fall-Related Injury  Outcome: Ongoing, Progressing

## 2023-06-15 NOTE — NURSING
Spoke with Dr PAULINE Wilson, patient refusing lab draws and any participation in care do to change in his pain medication. They will speak with him on rounds, patient notified.

## 2023-06-16 VITALS
RESPIRATION RATE: 20 BRPM | WEIGHT: 184 LBS | DIASTOLIC BLOOD PRESSURE: 89 MMHG | HEART RATE: 110 BPM | SYSTOLIC BLOOD PRESSURE: 156 MMHG | HEIGHT: 67 IN | OXYGEN SATURATION: 96 % | BODY MASS INDEX: 28.88 KG/M2 | TEMPERATURE: 99 F

## 2023-06-16 PROBLEM — I95.9 HYPOTENSION: Status: ACTIVE | Noted: 2023-06-16

## 2023-06-16 LAB
ALBUMIN SERPL-MCNC: 2.1 G/DL (ref 3.5–5)
ALBUMIN/GLOB SERPL: 0.7 RATIO (ref 1.1–2)
ALP SERPL-CCNC: 40 UNIT/L (ref 40–150)
ALT SERPL-CCNC: 74 UNIT/L (ref 0–55)
AST SERPL-CCNC: 42 UNIT/L (ref 5–34)
BASOPHILS # BLD AUTO: 0.13 X10(3)/MCL
BASOPHILS NFR BLD AUTO: 0.7 %
BILIRUBIN DIRECT+TOT PNL SERPL-MCNC: 0.2 MG/DL
BUN SERPL-MCNC: 41.8 MG/DL (ref 8.9–20.6)
CALCIUM SERPL-MCNC: 8.3 MG/DL (ref 8.4–10.2)
CHLORIDE SERPL-SCNC: 101 MMOL/L (ref 98–107)
CO2 SERPL-SCNC: 28 MMOL/L (ref 22–29)
CREAT SERPL-MCNC: 3.74 MG/DL (ref 0.73–1.18)
EOSINOPHIL # BLD AUTO: 0.68 X10(3)/MCL (ref 0–0.9)
EOSINOPHIL NFR BLD AUTO: 3.8 %
ERYTHROCYTE [DISTWIDTH] IN BLOOD BY AUTOMATED COUNT: 13 % (ref 11.5–17)
GFR SERPLBLD CREATININE-BSD FMLA CKD-EPI: 21 MLS/MIN/1.73/M2
GLOBULIN SER-MCNC: 3.1 GM/DL (ref 2.4–3.5)
GLUCOSE SERPL-MCNC: 96 MG/DL (ref 74–100)
HCT VFR BLD AUTO: 29.4 % (ref 42–52)
HGB BLD-MCNC: 9.6 G/DL (ref 14–18)
IMM GRANULOCYTES # BLD AUTO: 0.5 X10(3)/MCL (ref 0–0.04)
IMM GRANULOCYTES NFR BLD AUTO: 2.8 %
LYMPHOCYTES # BLD AUTO: 2.24 X10(3)/MCL (ref 0.6–4.6)
LYMPHOCYTES NFR BLD AUTO: 12.4 %
MAGNESIUM SERPL-MCNC: 1.9 MG/DL (ref 1.6–2.6)
MCH RBC QN AUTO: 29.1 PG (ref 27–31)
MCHC RBC AUTO-ENTMCNC: 32.7 G/DL (ref 33–36)
MCV RBC AUTO: 89.1 FL (ref 80–94)
MONOCYTES # BLD AUTO: 2.12 X10(3)/MCL (ref 0.1–1.3)
MONOCYTES NFR BLD AUTO: 11.7 %
NEUTROPHILS # BLD AUTO: 12.38 X10(3)/MCL (ref 2.1–9.2)
NEUTROPHILS NFR BLD AUTO: 68.6 %
NRBC BLD AUTO-RTO: 0 %
PHOSPHATE SERPL-MCNC: 5 MG/DL (ref 2.3–4.7)
PLATELET # BLD AUTO: 353 X10(3)/MCL (ref 130–400)
PMV BLD AUTO: 8.8 FL (ref 7.4–10.4)
POTASSIUM SERPL-SCNC: 4 MMOL/L (ref 3.5–5.1)
PROT SERPL-MCNC: 5.2 GM/DL (ref 6.4–8.3)
RBC # BLD AUTO: 3.3 X10(6)/MCL (ref 4.7–6.1)
SODIUM SERPL-SCNC: 140 MMOL/L (ref 136–145)
WBC # SPEC AUTO: 18.05 X10(3)/MCL (ref 4.5–11.5)

## 2023-06-16 PROCEDURE — 63600175 PHARM REV CODE 636 W HCPCS: Performed by: SURGERY

## 2023-06-16 PROCEDURE — 84100 ASSAY OF PHOSPHORUS: CPT | Performed by: STUDENT IN AN ORGANIZED HEALTH CARE EDUCATION/TRAINING PROGRAM

## 2023-06-16 PROCEDURE — 36558 INSERT TUNNELED CV CATH: CPT | Performed by: SURGERY

## 2023-06-16 PROCEDURE — 77001 FLUOROGUIDE FOR VEIN DEVICE: CPT | Performed by: SURGERY

## 2023-06-16 PROCEDURE — 99152 MOD SED SAME PHYS/QHP 5/>YRS: CPT | Performed by: SURGERY

## 2023-06-16 PROCEDURE — 80100016 HC MAINTENANCE HEMODIALYSIS

## 2023-06-16 PROCEDURE — C1750 CATH, HEMODIALYSIS,LONG-TERM: HCPCS | Performed by: SURGERY

## 2023-06-16 PROCEDURE — 63600175 PHARM REV CODE 636 W HCPCS

## 2023-06-16 PROCEDURE — 25000003 PHARM REV CODE 250: Performed by: SURGERY

## 2023-06-16 PROCEDURE — 85025 COMPLETE CBC W/AUTO DIFF WBC: CPT | Performed by: STUDENT IN AN ORGANIZED HEALTH CARE EDUCATION/TRAINING PROGRAM

## 2023-06-16 PROCEDURE — 94761 N-INVAS EAR/PLS OXIMETRY MLT: CPT

## 2023-06-16 PROCEDURE — 25000003 PHARM REV CODE 250: Performed by: STUDENT IN AN ORGANIZED HEALTH CARE EDUCATION/TRAINING PROGRAM

## 2023-06-16 PROCEDURE — 63600175 PHARM REV CODE 636 W HCPCS: Performed by: STUDENT IN AN ORGANIZED HEALTH CARE EDUCATION/TRAINING PROGRAM

## 2023-06-16 PROCEDURE — 83735 ASSAY OF MAGNESIUM: CPT | Performed by: STUDENT IN AN ORGANIZED HEALTH CARE EDUCATION/TRAINING PROGRAM

## 2023-06-16 PROCEDURE — 80053 COMPREHEN METABOLIC PANEL: CPT | Performed by: STUDENT IN AN ORGANIZED HEALTH CARE EDUCATION/TRAINING PROGRAM

## 2023-06-16 DEVICE — PRECISION HSI CHRONIC CATHETER KIT,SYMMETRICAL TIP, HEPARIN COATING, SILVER ION SLEEVE AND TAL VENATRAC STYLET,14.5 FR/CH (4.8 MM) X 23 CM
Type: IMPLANTABLE DEVICE | Site: NECK | Status: FUNCTIONAL
Brand: PALINDROME

## 2023-06-16 RX ORDER — LIDOCAINE HYDROCHLORIDE 10 MG/ML
INJECTION INFILTRATION; PERINEURAL
Status: DISCONTINUED | OUTPATIENT
Start: 2023-06-16 | End: 2023-06-16 | Stop reason: HOSPADM

## 2023-06-16 RX ORDER — HEPARIN SODIUM 5000 [USP'U]/ML
INJECTION, SOLUTION INTRAVENOUS; SUBCUTANEOUS
Status: DISCONTINUED | OUTPATIENT
Start: 2023-06-16 | End: 2023-06-16 | Stop reason: HOSPADM

## 2023-06-16 RX ORDER — MIDAZOLAM HYDROCHLORIDE 5 MG/ML
INJECTION INTRAMUSCULAR; INTRAVENOUS
Status: DISCONTINUED | OUTPATIENT
Start: 2023-06-16 | End: 2023-06-16 | Stop reason: HOSPADM

## 2023-06-16 RX ORDER — HYDROCODONE BITARTRATE AND ACETAMINOPHEN 10; 325 MG/1; MG/1
1 TABLET ORAL EVERY 12 HOURS PRN
Qty: 14 TABLET | Refills: 0 | Status: SHIPPED | OUTPATIENT
Start: 2023-06-16 | End: 2023-06-23

## 2023-06-16 RX ADMIN — TAMSULOSIN HYDROCHLORIDE 0.4 MG: 0.4 CAPSULE ORAL at 09:06

## 2023-06-16 RX ADMIN — HYDROCODONE BITARTRATE AND ACETAMINOPHEN 1 TABLET: 10; 325 TABLET ORAL at 02:06

## 2023-06-16 RX ADMIN — HYDROMORPHONE HYDROCHLORIDE 0.2 MG: 0.5 INJECTION, SOLUTION INTRAMUSCULAR; INTRAVENOUS; SUBCUTANEOUS at 06:06

## 2023-06-16 RX ADMIN — ACETAMINOPHEN 650 MG: 325 TABLET ORAL at 01:06

## 2023-06-16 RX ADMIN — HYDROMORPHONE HYDROCHLORIDE 0.2 MG: 0.5 INJECTION, SOLUTION INTRAMUSCULAR; INTRAVENOUS; SUBCUTANEOUS at 02:06

## 2023-06-16 RX ADMIN — QUETIAPINE FUMARATE 100 MG: 100 TABLET, FILM COATED ORAL at 09:06

## 2023-06-16 RX ADMIN — HEPARIN SODIUM 5000 UNITS: 5000 INJECTION, SOLUTION INTRAVENOUS; SUBCUTANEOUS at 03:06

## 2023-06-16 RX ADMIN — HYDROCODONE BITARTRATE AND ACETAMINOPHEN 1 TABLET: 10; 325 TABLET ORAL at 09:06

## 2023-06-16 RX ADMIN — ACETAMINOPHEN 650 MG: 325 TABLET ORAL at 05:06

## 2023-06-16 RX ADMIN — LEVETIRACETAM 500 MG: 500 TABLET, FILM COATED ORAL at 09:06

## 2023-06-16 RX ADMIN — HEPARIN SODIUM 5000 UNITS: 5000 INJECTION, SOLUTION INTRAVENOUS; SUBCUTANEOUS at 05:06

## 2023-06-16 RX ADMIN — GABAPENTIN 300 MG: 300 CAPSULE ORAL at 09:06

## 2023-06-16 RX ADMIN — GABAPENTIN 300 MG: 300 CAPSULE ORAL at 03:06

## 2023-06-16 NOTE — OP NOTE
[facility] - Cath Lab  Operative Note    SUMMARY     Surgery Date: 6/16/2023     Surgeon(s) and Role:     * Eliazar Lopes III, MD - Primary    Assisting Surgeon:      * Esme Harley MD - Resident     Pre-op Diagnosis:  Acute renal failure, unspecified acute renal failure type [N17.9]    Post-op Diagnosis:  Post-Op Diagnosis Codes:     * Acute renal failure, unspecified acute renal failure type [N17.9]    Procedure(s) (LRB):  Insertion, Catheter, Central Venous, Hemodialysis (N/A)    Anesthesia: RN IV Sedation    Operative Findings: temporary HD cath exchanged for tunneled HD cath successfully    Operative Report:   The patient was brought to the cath lab and placed supine on table. Sedative was given and patient's neck was prepped with betadine and draped in standard sterile fashion. Sutures securing catheter were cut, and wire was inserted into catheter. Lidocaine was infiltrated around catheter site for local anesthesia. Catheter was removed over wire. Next, the peel-away sheath was passed over the wire under fluoroscopic guidance. We then used lidocaine to numb tract from chest wall to venipuncture site. A small skin nick was made with 11-blade scalpel on chest wall insertion site. Tunneler with 23 cm, 14.5 Fr dialysis catheter was then tunneled subcutaneously to venipuncture site. The catheter was then passed through the sheath and the peel away sheath was removed. The catheter was then easily flushed with saline and heparin locked. 2-0 nylon was used to suture catheter to chest wall and 4-0 Monocryl was used to close previous non-tunneled catheter insertion site. Dermabond was applied and chlorhexidene dressing was placed over catheter insertion site. The patient tolerated the procedure well with no complications.       Estimated Blood Loss: * No values recorded between 6/16/2023 11:21 AM and 6/16/2023 11:25 AM *    Estimated Blood Loss has not been documented. EBL = 5.         Specimens:   Specimen (24h  ago, onward)      None            [id number]    Esme Harley MD   [employer] General Surgery, PGY-1

## 2023-06-16 NOTE — PROGRESS NOTES
"  [facility]  General Surgery - Gold Team  Progress Note  Admit Date: 6/2/2023  HD#14  POD#* No surgery date entered *    Subjective:   Interval history:  AF, hypertensive with SBP to 170s  Last PO intake prior to midnight  WBC 18 from 16.2  Blood cxs with no growth at 48h   Pt complains of tooth ache. No additional complaints.       Objective:     VITAL SIGNS: 24 HR MIN & MAX LAST   Temp  Min: 97.3 °F (36.3 °C)  Max: 99.3 °F (37.4 °C)  98.8 °F (37.1 °C)   BP  Min: 142/90  Max: 173/84  (!) 142/90    Pulse  Min: 93  Max: 101  93    Resp  Min: 16  Max: 20  18    SpO2  Min: 91 %  Max: 96 %  95 %      HT: 5' 7" (170.2 cm)  WT: 83.5 kg (184 lb)  BMI: 28.8       Physical examination:  Gen: NAD, AAOx3, answering questions appropriately  HEENT: NCAT. RIJ HD catheter  CV: RR  Resp: NWOB  Abd: S/NT/ND  Ext: moving all extremities spontaneously and purposefully  Neuro: CN II-XII grossly intact    Labs:  Recent Labs     06/14/23  0325 06/15/23  0322 06/15/23  1057 06/16/23  0316   WBC 18.49* 16.21*  --  18.05*   HGB 10.2* 9.6*  --  9.6*   HCT 32.2* 29.3*  --  29.4*    321  --  353     --  139 140   K 4.1  --  4.1 4.0   CO2 25  --  27 28   BUN 46.5*  --  39.9* 41.8*   CREATININE 3.94*  --  3.55* 3.74*   BILITOT 0.3  --  0.3 0.2   AST 49*  --  46* 42*   ALT 99*  --  83* 74*   ALKPHOS 34*  --  40 40   CALCIUM 8.1*  --  8.3* 8.3*   ALBUMIN 1.9*  --  2.0* 2.1*   MG 1.80  --  1.60 1.90   PHOS 5.1*  --  3.5 5.0*       Imaging:  CT Abdomen Pelvis  Without Contrast   Final Result      1. Anasarca.  There are pleural effusions and diffuse body wall edema.  Perihilar opacities may be related to pulmonary edema, multifocal pneumonia or ARDS.   2. Persistent left nephrogram compatible with acute renal insufficiency.   3. Limited assessment for gastrointestinal bleed in the absence of intravenous contrast         Electronically signed by: Cindy Davey   Date:    06/13/2023   Time:    16:08      X-Ray " Chest 1 View   Final Result      Some improvement in the changes of pulmonary vascular congestion and cardiac decompensation.      No other significant change         Electronically signed by: Emmanuel Waldron   Date:    06/12/2023   Time:    08:49      CT Cervical Spine Without Contrast   Final Result      CT Chest Abdomen Pelvis W W/O Contrast (XPD)   Final Result      X-Ray Chest 1 View   Final Result      Persistent pulmonary edema with slight improvement.         Electronically signed by: Eliazar Gibson MD   Date:    06/10/2023   Time:    10:52      X-Ray Chest 1 View   Final Result      Worsening consolidative changes more so on the left than on the right with some worsening also in the right upper lobe.      No other change         Electronically signed by: Emmanuel Waldron   Date:    06/08/2023   Time:    09:25      X-Ray Chest 1 View   Final Result   .   No significant interval change.         Electronically signed by: Adi Amato   Date:    06/07/2023   Time:    07:26      X-Ray Chest 1 View   Final Result      No significant interval change.         Electronically signed by: Ruiz Elam   Date:    06/06/2023   Time:    06:44      X-Ray Chest 1 View   Final Result      No adverse interval change.  Slight interval improvement.         Electronically signed by: Franco Gonzáles   Date:    06/05/2023   Time:    06:43      X-Ray Chest 1 View   Final Result      Unchanged multifocal right greater than left airspace opacities most consistent with multifocal pneumonia.         Electronically signed by: Eliazar Ronquillo MD   Date:    06/03/2023   Time:    18:58      X-Ray Chest 1 View   Final Result      As above.         Electronically signed by: Franco Gonzáles   Date:    06/03/2023   Time:    11:48      X-Ray Chest 1 View   Final Result      Interval placement of dialysis catheter with concern for developing right-sided effusion.  Hemothorax is not entirely excluded.         Electronically signed by: Eliazar Gibson MD    Date:    06/02/2023   Time:    22:26      CT Chest Abdomen Pelvis Without Contrast (XPD)   Final Result      1. Multifocal lung consolidation, likely pneumonia.   2. Small volume ascites with nonspecific gallbladder wall thickening.  Some of the ascitic fluid in the pelvis appears complex.   3. Mild hepatomegaly.         Electronically signed by: Arley Cuevas   Date:    06/02/2023   Time:    18:40      US Retroperitoneal Limited   Final Result      X-Ray Chest AP Portable   Final Result      Findings concerning for right mid to lower lung infectious process.         Electronically signed by: Franco Gonzáles   Date:    06/02/2023   Time:    13:48         I have reviewed all pertinent imaging results/findings within the past 24 hours.    Assessment & Plan:   32M w/PMHx of polysubstance abuse admitted to the ICU w/ rhabdomyolysis. Surgery consulted initially for temporary HD catheter. Plans for tunneled HD catheter today    - Keep pt NPO  - Plans for tunneled HD line today  - Pt is booked and consented    Esme Harley MD  [employer] General Surgery, PGY-1

## 2023-06-16 NOTE — PT/OT/SLP PROGRESS
Physical Therapy    Missed Treatment Session    Patient Name:  Ryan Wild   MRN:  [id number]      Patient not seen at this time secondary to off the floor for procedure/surgery (HD cath)    Will follow-up as patient is available to participate and as therapists' schedule allows

## 2023-06-16 NOTE — PROGRESS NOTES
Inpatient Nutrition Assessment    Admit Date: 6/2/2023   Total duration of encounter: 14 days     Nutrition Recommendation/Prescription     Post surgery/HD cath placement--ADAT to Renal Dialysis  Discontinue Boost oral supplement as appetite / intake improving   3.   Renal MVI  4.   Daily wts      Communication of Recommendations: reviewed with patient and reviewed with family    Nutrition Assessment     Malnutrition Assessment/Nutrition-Focused Physical Exam    Malnutrition Context: acute illness or injury  Malnutrition Level: other (see comments) (pt not able to give full diet/wt hx; not meeting malnutrition criteria)  Energy Intake (Malnutrition): less than or equal to 50% for greater than or equal to 5 days               A minimum of two characteristics is recommended for diagnosis of either severe or non-severe malnutrition.    Chart Review    Reason Seen: continuous nutrition monitoring    Malnutrition Screening Tool Results   Have you recently lost weight without trying?: No  Have you been eating poorly because of a decreased appetite?: No   MST Score: 0     Diagnosis:  Meth/fentanyl OD, metabolic encephalopathy, ARF/ATN/rhabdo--on HD; hx polysubstance abuse, shock, PNA, fluid overload, NSTEMI ; (6/16) HD tunnel cath placement today     Relevant Medical History: seizure--drug use, Hep C, solitary kidney     Nutrition-Related Medications: polyethylene glycol, calcium carbonate PRN,   Calorie Containing IV Medications: no significant kcals from medications at this time    Nutrition-Related Labs:  6/12:  H/H 10.6 L/31.8 L, BUN 41.9 H, Creat 4.3 H, Glu 107, Alb 1.8 L, AST 60 H,  H  (6-16) H/H 9.6/29.4(L) Gluc 96 bun 41.8(H) Cr 3.7(H) GFR 21(L) P 5.0(H) AST 42 ALT 74     Diet/PN Order: Diet NPO Except for: Sips with Medication, Medication  Oral Supplement Order: Boost Plus  Tube Feeding Order: none  Appetite/Oral Intake: fair/50-75% of meals; 6/16 pt npo for procedure   Factors Affecting Nutritional Intake:  "decreased appetite, respiratory status, and shortness of breath  Food/Anabaptist/Cultural Preferences: none reported  Food Allergies: none reported       Wound(s):   none    Comments  6/16: Pt currently NPO for Tunnel cath placement; pt hungry for food; has been eating better; post procedure--suggest advance diet back to renal diet. Wt elevated/ + anasarca/fluid. Abnormal labs reflective renal dz.     6/12:  Pt appetite / intake improving.  Asked by mother at bedside to educate pt on Renal diet - pt to begin dialysis 3 days / week.  Pt scheduled for dialysis cath placement.  Pt not happy with renal restrictions and change to diet but understands the importance.  Labs / meds reviewed - reflective of current dx.    (6/9) Pt sitting in bed/ vapotherm; feeling better; reported able to eat some of food /drink boost supplement; no N/V; Nephrology monitor renal status/ recovery--no HD past 2 days; Bun/Cr/GFR--still abnormal. No new wt. Current diet tx appropriate. Encouraged oral intake.     (6/5) Pt receiving HD: has bipap; per RN--pt has been agitated; unable to remove mask to eat at this time; unable to obtain diet/wt hx; per EMR--current wt elevated/ ? Fluid; both diet/TF recs provided to meet nutrient needs.       Anthropometrics    Height: 5' 7" (170.2 cm) Height Method: Stated  Last Weight: 83.5 kg (184 lb) (06/15/23 2137) Weight Method: Standard Scale  BMI (Calculated): 28.8  BMI Classification: normal (BMI 18.5-24.9)        Ideal Body Weight (IBW), Male: 148 lb     % Ideal Body Weight, Male (lb): 103.38 %                 Usual Body Weight (UBW), kg:  (pt unable to provide wt hx; EMR wt (2-13) 61.7kg)        Usual Weight Provided By: EMR weight history    Wt Readings from Last 5 Encounters:   06/15/23 83.5 kg (184 lb)   02/13/23 61.7 kg (136 lb)   10/10/22 63.3 kg (139 lb 8 oz)   07/21/22 64.3 kg (141 lb 12.8 oz)   05/10/22 64.4 kg (141 lb 15.6 oz)     Weight Change(s) Since Admission:  Admit Weight: 69.4 kg (153 " lb) (23 1310)  No hx   () wt elevated 83.5kg; + fluid/anasarca/ will monitor   Estimated Needs    Weight Used For Calorie Calculations: 69.4 kg (153 lb)  Energy Calorie Requirements (kcal): 2082 kcal/d; 30 vanita/kg  Energy Need Method: Kcal/kg  Weight Used For Protein Calculations: 69.4 kg (153 lb)  Protein Requirements: 83 gm protein/d; 1.2 gm/kg ; on HD for ARF  Fluid Requirements (mL): 2082 ml/d; 1ml/vanita  Temp (24hrs), Av.5 °F (36.9 °C), Min:97.3 °F (36.3 °C), Max:99.3 °F (37.4 °C)       Enteral Nutrition    Patient not receiving enteral nutrition at this time.    Parenteral Nutrition    Patient not receiving parenteral nutrition support at this time.    Evaluation of Received Nutrient Intake    Calories: not meeting estimated needs  Protein: not meeting estimated needs    Patient Education    Education Provided: renal diet  Teaching Method: explanation and printed materials  Comprehension: needs further teaching and needs reinforcement  Barriers to Learning: desire/motivation  Expected Compliance: fair  Comments: All questions were answered and dietitian's contact information was provided.     Nutrition Diagnosis     PES: Inadequate oral intake related to acute illness as evidenced by SOB/on bipap/ eating < 25% . (improving)    Interventions/Goals     Intervention(s): general/healthful diet, commercial beverage, multivitamin/mineral supplement therapy, and collaboration with other providers  Goal: Meet greater than 75% of nutritional needs by follow-up. (goal progressing)    Monitoring & Evaluation     Dietitian will monitor food and beverage intake, weight, and electrolyte/renal panel.  Nutrition Risk/Follow-Up: moderate (follow-up in 3-5 days)   Please consult if re-assessment needed sooner.

## 2023-06-16 NOTE — INTERVAL H&P NOTE
The patient has been examined and the H&P has been reviewed:    I concur with the findings and no changes have occurred since H&P was written.    Surgery risks, benefits and alternative options discussed and understood by patient/family.    ASA: 2   Mallampati: 2    Patient has been NPO since midnight. Off any supplemental O2. Bcx NG @ 48 hours.     Whitley Rushing MD    Active Hospital Problems    Diagnosis  POA    *Non-traumatic rhabdomyolysis [M62.82]  Yes    Community acquired pneumonia of right lung [J18.9]  Unknown    Septic shock [A41.9, R65.21]  No    Acute renal failure [N17.9]  Yes    Metabolic acidosis [E87.20]  Yes    KINGSLEY (acute kidney injury) [N17.9]  Yes    NSTEMI (non-ST elevated myocardial infarction) [I21.4]  Yes    Shock [R57.9]  Yes    Solitary kidney, congenital [Q60.0]  Not Applicable    Hyperkalemia [E87.5]  Yes    Hypermagnesemia [E83.41]  Yes    Elevated levels of transaminase & lactic acid dehydrogenase [R74.01, R74.02]  Yes    Seizure disorder [G40.909]  Yes      Resolved Hospital Problems   No resolved problems to display.      [] : results reviewed

## 2023-06-16 NOTE — DISCHARGE SUMMARY
[facility] Discharge Summary    Admitting Physician: Dewayne Torres MD  Attending Physician: Naida Corbin MD  Date of Admit: 6/2/2023  Date of Discharge: 6/16/2023    Condition: Stable  Outcome: Condition has improved and patient is now ready for discharge.  DISPOSITION: Home or Self Care    Discharge Diagnoses     Principal Problem:  Non-traumatic rhabdomyolysis  Active Hospital Problems    Diagnosis  POA    *Non-traumatic rhabdomyolysis [M62.82]  Yes    Community acquired pneumonia of right lung [J18.9]  Yes    Septic shock [A41.9, R65.21]  No    Acute renal failure [N17.9]  Yes    Metabolic acidosis [E87.20]  Yes    KINGSLEY (acute kidney injury) [N17.9]  Yes    NSTEMI (non-ST elevated myocardial infarction) [I21.4]  Yes    Shock [R57.9]  Yes    Solitary kidney, congenital [Q60.0]  Not Applicable    Hyperkalemia [E87.5]  Yes    Hypermagnesemia [E83.41]  Yes    Elevated levels of transaminase & lactic acid dehydrogenase [R74.01, R74.02]  Yes    Seizure disorder [G40.909]  Yes      Resolved Hospital Problems   No resolved problems to display.     Consultants and Procedures     Consultants:  IP CONSULT TO NEPHROLOGY  IP CONSULT TO SOCIAL WORK/CASE MANAGEMENT  IP CONSULT TO CARDIOLOGY  IP CONSULT TO SOCIAL WORK/CASE MANAGEMENT  IP CONSULT TO SOCIAL WORK/CASE MANAGEMENT  IP CONSULT TO SOCIAL WORK/CASE MANAGEMENT  IP CONSULT TO VASCULAR SURGERY    Procedures:   Procedure(s) (LRB):  Insertion, Catheter, Central Venous, Hemodialysis (N/A)     Brief Admission History      32 y.o. male with history of seizures (off Keppra), drug use, hepatitis C (treated), and solitary kidney who presented to the ED on 6/2/2023  with a primary complaint of back pain. Patient is a  who had worked a 12 hour shift out in the sun the day prior. States that he went to bed feeling great, without any complaints. Then he woke up late for work day of admission and admits to not remembering much of what happened  "afterwards. His coworkers brought him to the ED. Admits to severe back pain that is aggravated by movement, weakness, shortness of breath. Also has not urinated all day. Denies chest pain, palpitations, headache, nausea, vomiting, abdominal pain, fevers. Last IV drug use 1 year ago, but snorted meth 2 days ago. Also uses IM steroids, last injection last night. Has not taken his home Keppra for the past few months; his last seizure was on 8/2022.     Hospital Course with Pertinent Findings     Patient admitted for rhabdomyolsis likely secondary to illicit drug use resulting in 14 day hospital admission. On admit, was in the ICU requiring HD emergently, Levophed, Precedex, BiPAP. Temporary cath was placed for every other day dialysis with hopes for renal function to return to baseline. His clinical status and CK continued to improve, although without evidence of renal recovery. He was downgraded to floor on day 7, howver still required HFNC and dialysis. Tunnel cath was placed for continuing outpatient hemodialysis which was set up for him [facility] schedule. Initial CPK >34,000, eventually resulting to <500. Vitals remained stable and he was weaned off oxygen, returning to baseline. Of note, his WBC was initially 40K and downtrended throughout admission, however still remained elevated. Initial and repeat blood cultures were negative and he remained without signs of infection thus the leukocytosis likely result of stress reaction.      Discharge physical exam:  Vitals  BP: (!) 149/85  Temp: 98.5 °F (36.9 °C)  Temp Source: Oral  Pulse: 91  Resp: 20  SpO2: 96 %  Height: 5' 7" (170.2 cm)  Weight: 83.5 kg (184 lb)    Physical Exam  Vitals and nursing note reviewed.   Constitutional:       General: He is not in acute distress.     Appearance: He is not diaphoretic.   HENT:      Head: Atraumatic.      Mouth/Throat:      Mouth: Mucous membranes are moist.      Pharynx: Oropharynx is clear.   Eyes:      General: No scleral " icterus.     Extraocular Movements: Extraocular movements intact.   Cardiovascular:      Rate and Rhythm: Normal rate and regular rhythm.      Heart sounds: No murmur heard.  Pulmonary:      Effort: No respiratory distress.      Breath sounds: No wheezing.   Abdominal:      General: Abdomen is flat. There is no distension.      Palpations: Abdomen is soft.      Tenderness: There is no abdominal tenderness.   Musculoskeletal:      Right lower leg: No edema.      Left lower leg: No edema.   Skin:     General: Skin is warm and dry.      Capillary Refill: Capillary refill takes less than 2 seconds.      Coloration: Skin is not jaundiced or pale.      Comments: Tunnel cath in place   Neurological:      Mental Status: He is alert and oriented to person, place, and time.      Comments: CNII-XII grossly intact    Psychiatric:         Behavior: Behavior normal.       TIME SPENT ON DISCHARGE: 60 minutes    Discharge Medications        Medication List        START taking these medications      HYDROcodone-acetaminophen  mg per tablet  Commonly known as: NORCO  Take 1 tablet by mouth every 12 (twelve) hours as needed for Pain.            CONTINUE taking these medications      albuterol 90 mcg/actuation inhaler  Commonly known as: PROVENTIL/VENTOLIN HFA     fluticasone propionate 50 mcg/actuation nasal spray  Commonly known as: FLONASE     levETIRAcetam 500 MG Tab  Commonly known as: KEPPRA     QUEtiapine 100 MG Tab  Commonly known as: SEROQUEL  Take 1 tablet (100 mg total) by mouth nightly.     valACYclovir 1000 MG tablet  Commonly known as: VALTREX     venlafaxine 37.5 MG 24 hr capsule  Commonly known as: EFFEXOR-XR               Where to Get Your Medications        These medications were sent to [address] [address]      Phone: [phone]   HYDROcodone-acetaminophen  mg per tablet         Discharge Information:      Complete all medications as prescribed.     ED precautions discussed including but not limited to weakness, SOB.      Maintain the following appointments:   Follow-up Information       [facility] Follow up.    Specialty: Family Medicine  Why: Someone will contact you to schedule appointment  Contact information:  [address] [facility] [address] [phone]             [facility] Emergency [facility] Follow up.    Specialty: Emergency Medicine  Why: If symptoms worsen  Contact information:  [address] [facility] [address] [phone]             [facility]. Go to.    Why: MWF for dialysis  Contact information:  [address] [place] [address] [phone]                       Katina Palma MD  [facility]

## 2023-06-16 NOTE — PT/OT/SLP PROGRESS
Physical Therapy    Missed Treatment Session    Patient Name:  Ryan Wild   MRN:  [id number]      Patient not seen at this time secondary to Dialysis.    Will follow-up as patient is available to participate and as therapists' schedule allows.

## 2023-06-16 NOTE — NURSING
06/16/23 1615        Hemodialysis Catheter 06/16/23 1127 right internal jugular   Placement Date/Time: 06/16/23 1127   Present Prior to Hospital Arrival?: No  Hand Hygiene: Performed  Barrier Precautions: Performed  Skin Antisepsis: ChloraPrep  Hemodialysis Catheter Type: Tunneled catheter  Location: right internal jugular  Cathete...   Line Necessity Review CRRT/HD   Site Assessment No drainage;No redness;No swelling;No warmth   Dressing Type Central line dressing   Dressing Status Clean;Dry;Intact   Dressing Intervention Integrity maintained   Date on Dressing 06/16/23   Post-Hemodialysis Assessment   Rinseback Volume (mL) 250 mL   Blood Volume Processed (Liters) 56.4 L   Dialyzer Clearance Heavily streaked   Duration of Treatment 180 minutes   Total UF (mL) 2000 mL   Net Fluid Removal 1500   Patient Response to Treatment Tolerated well. Lines ultimately had to be reversed d/t high Arterial pressures. Able to complete treatment with lines reversed.   Post-Hemodialysis Comments Blood rinsed back per P&P

## 2023-06-16 NOTE — PROGRESS NOTES
Tunneled Catheter Op Report faxed to [facility] at [phone]. Scheduled to begin OP Dialysis Monday at 12:30. Pt reporting he has nowhere to go upon discharge. Multiple family members at bedside. Pt refused mother's, Jo Ann Kendall ([phone]) offer to stay with her in [facility]. Fly will discuss discharge options with pt.

## 2023-06-16 NOTE — PLAN OF CARE
Problem: Infection  Goal: Absence of Infection Signs and Symptoms  Outcome: Ongoing, Progressing     Problem: Adult Inpatient Plan of Care  Goal: Plan of Care Review  Outcome: Ongoing, Progressing  Goal: Patient-Specific Goal (Individualized)  Outcome: Ongoing, Progressing  Goal: Absence of Hospital-Acquired Illness or Injury  Outcome: Ongoing, Progressing  Goal: Optimal Comfort and Wellbeing  Outcome: Ongoing, Progressing  Goal: Readiness for Transition of Care  Outcome: Ongoing, Progressing     Problem: Fluid and Electrolyte Imbalance (Acute Kidney Injury/Impairment)  Goal: Fluid and Electrolyte Balance  Outcome: Ongoing, Progressing     Problem: Oral Intake Inadequate (Acute Kidney Injury/Impairment)  Goal: Optimal Nutrition Intake  Outcome: Ongoing, Progressing     Problem: Renal Function Impairment (Acute Kidney Injury/Impairment)  Goal: Effective Renal Function  Outcome: Ongoing, Progressing     Problem: Device-Related Complication Risk (Hemodialysis)  Goal: Safe, Effective Therapy Delivery  Outcome: Ongoing, Progressing     Problem: Hemodynamic Instability (Hemodialysis)  Goal: Effective Tissue Perfusion  Outcome: Ongoing, Progressing     Problem: Infection (Hemodialysis)  Goal: Absence of Infection Signs and Symptoms  Outcome: Ongoing, Progressing     Problem: Skin Injury Risk Increased  Goal: Skin Health and Integrity  Outcome: Ongoing, Progressing     Problem: Pain Acute  Goal: Acceptable Pain Control and Functional Ability  Outcome: Ongoing, Progressing     Problem: Adjustment to Illness (Sepsis/Septic Shock)  Goal: Optimal Coping  Outcome: Ongoing, Progressing     Problem: Bleeding (Sepsis/Septic Shock)  Goal: Absence of Bleeding  Outcome: Ongoing, Progressing     Problem: Infection Progression (Sepsis/Septic Shock)  Goal: Absence of Infection Signs and Symptoms  Outcome: Ongoing, Progressing     Problem: Nutrition Impaired (Sepsis/Septic Shock)  Goal: Optimal Nutrition Intake  Outcome: Ongoing,  Progressing     Problem: Fall Injury Risk  Goal: Absence of Fall and Fall-Related Injury  Outcome: Ongoing, Progressing     Problem: Fluid Imbalance (Pneumonia)  Goal: Fluid Balance  Outcome: Ongoing, Progressing     Problem: Infection (Pneumonia)  Goal: Resolution of Infection Signs and Symptoms  Outcome: Ongoing, Progressing     Problem: Respiratory Compromise (Pneumonia)  Goal: Effective Oxygenation and Ventilation  Outcome: Ongoing, Progressing

## 2023-06-16 NOTE — PLAN OF CARE
Problem: Infection  Goal: Absence of Infection Signs and Symptoms  Outcome: Ongoing, Progressing     Problem: Adult Inpatient Plan of Care  Goal: Absence of Hospital-Acquired Illness or Injury  Outcome: Ongoing, Progressing  Goal: Optimal Comfort and Wellbeing  Outcome: Ongoing, Progressing     Problem: Fluid and Electrolyte Imbalance (Acute Kidney Injury/Impairment)  Goal: Fluid and Electrolyte Balance  Outcome: Ongoing, Progressing     Problem: Oral Intake Inadequate (Acute Kidney Injury/Impairment)  Goal: Optimal Nutrition Intake  Outcome: Ongoing, Progressing     Problem: Renal Function Impairment (Acute Kidney Injury/Impairment)  Goal: Effective Renal Function  Outcome: Ongoing, Progressing     Problem: Device-Related Complication Risk (Hemodialysis)  Goal: Safe, Effective Therapy Delivery  Outcome: Ongoing, Progressing     Problem: Infection (Hemodialysis)  Goal: Absence of Infection Signs and Symptoms  Outcome: Ongoing, Progressing     Problem: Pain Acute  Goal: Acceptable Pain Control and Functional Ability  Outcome: Ongoing, Progressing     Problem: Bleeding (Sepsis/Septic Shock)  Goal: Absence of Bleeding  Outcome: Ongoing, Progressing     Problem: Infection Progression (Sepsis/Septic Shock)  Goal: Absence of Infection Signs and Symptoms  Outcome: Ongoing, Progressing

## 2023-06-16 NOTE — HOSPITAL COURSE
Patient admitted for rhabdomyolsis likely secondary to illicit drug use resulting in 14 day hospital admission. On admit, was in the ICU requiring HD emergently, Levophed, Precedex, BiPAP. Temporary cath was placed for every other day dialysis with hopes for renal function to return to baseline. His clinical status and CK continued to improve, although without evidence of renal recovery. He was downgraded to floor on day 7, howver still required HFNC and dialysis. Tunnel cath was placed for continuing outpatient hemodialysis which was set up for him [facility] schedule. Initial CPK >34,000, eventually resulting to <500. Vitals remained stable and he was weaned off oxygen, returning to baseline. Of note, his WBC was initially 40K and downtrended throughout admission, however still remained elevated. Initial and repeat blood cultures were negative and he remained without signs of infection thus the leukocytosis likely result of stress reaction.

## 2023-06-16 NOTE — PROGRESS NOTES
[facility]  Nephrology Progress Note    Patient Name: Ryan Wild  Age: 32 y.o.  : 1990  MRN: [id number]  Admission Date: 2023    Chief complaint: Back Pain, Hypotension, and Hearing Problem (PT REPORTS WAKING UP W LOWER BACK PAIN, WEAKNESS, SOB AND HEARING LOSS. 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  DENIES DRUG USE. HX OF SEIZURES, NON COMPLIANT W MEDS > 1 MONTH.  .  EKG OBTAINED. )      Hospital course  Ryan Wild is a 32 y.o. White male with past medical history of seizures, polysubstance abuse, treated hepatitis-C, and solitary kidney.  Patient presented to the emergency department on 2023 with complaints of back pain, shortness breath, and decreased urinary frequency.  Patient believes that symptoms were precipitated by the fact that he has been working outside in the heat for 12 hours. He admitted to polysubstance abuse, including intravenous drug use, and anabolic steroid use.  UDS was positive for methamphetamines and fentanyl. In the emergency department, patient was tachycardic, hypotensive, and hypoxemic.  Laboratory results were remarkable for severe leukocytosis (WBC 41), azotemia, metabolic acidosis, hyperkalemia (serum potassium 6.9), transaminitis, hyperphosphatemia, elevated troponin, and elevated lactic acid level.  CK was 37,420 units per L.  Patient was admitted to the intensive care unit, placed on BiPAP, and emergently dialyzed for correction of life-threatening hyperkalemia on 2023.  Nephrology is continuing to follow for assistance with management of acute kidney injury and multiple electrolyte/acid-base abnormalities    Subjective  Patient is resting in bed.  On room air.  NPO for tunneled dialysis line placement today per vascular.  States he had less urine output with Lasix yesterday compared to the day prior.  Will undergo dialysis after tunneled line insertion.   "States overall edema slightly improved.  No chest pain or shortness breath.    Review of Systems  Negative except as stated above    Objective  BP (!) 149/85   Pulse 91   Temp 98.5 °F (36.9 °C) (Oral)   Resp 16   Ht 5' 7" (1.702 m)   Wt 83.5 kg (184 lb)   SpO2 (!) 94%   BMI 28.82 kg/m²   No intake or output data in the 24 hours ending 06/16/23 0918      Physical Exam  General appearance: Patient is in no acute distress. On RA  HEENT: EOMI, no JVD. Neck is supple.  Right IJ dialysis catheter  Chest:   Lung sounds are diminished to auscultation, improved from yesterday.    Heart: S1, S2.   Abdomen:  nondistended. Tight.  Nontender.  Bowel sounds present.  Extremities: generalized upper and lower extremity edema, mildly improved from yesterday   Neuro: No focal deficits.  Alert and oriented.    Medications    Current Facility-Administered Medications:     acetaminophen tablet 650 mg, 650 mg, Oral, Q6H PRN, Dillon Dominguez MD, 650 mg at 06/16/23 0526    bisacodyL suppository 10 mg, 10 mg, Rectal, Daily PRN, Dominic Armijo MD    calcium carbonate 200 mg calcium (500 mg) chewable tablet 500 mg, 500 mg, Oral, Daily PRN, Dillon Dominguez MD, 500 mg at 06/13/23 0648    calcium gluconate 1 g in NS IVPB (premixed), 1 g, Intravenous, PRN, Lm Yanes MD, Stopped at 06/04/23 0346    calcium gluconate 1 g in NS IVPB (premixed), 2 g, Intravenous, PRN, Lm Yanes MD    calcium gluconate 1 g in NS IVPB (premixed), 3 g, Intravenous, PRN, Lm Yanes MD    dextrose 10% bolus 125 mL 125 mL, 12.5 g, Intravenous, PRN, Alda Valladares MD, Stopped at 06/02/23 2234    dextrose 40 % gel 15,000 mg, 15 g, Oral, PRN, Alda Valladares MD    dextrose 40 % gel 30,000 mg, 30 g, Oral, PRN, Alda Valladares MD    fentaNYL 50 mcg/hr 1 patch, 1 patch, Transdermal, Q72H, Dillon Dominguez MD, 1 patch at 06/15/23 0709    gabapentin capsule 300 mg, 300 mg, Oral, TID, Dillon Dominguez MD, 300 mg at 06/15/23 2124    glucagon (human recombinant) " injection 1 mg, 1 mg, Intramuscular, PRN, Alda Valladares MD    heparin (porcine) injection 5,000 Units, 5,000 Units, Subcutaneous, Q8H, Bhakti Beckett MD, 5,000 Units at 06/16/23 0526    HYDROcodone-acetaminophen  mg per tablet 1 tablet, 1 tablet, Oral, Q8H PRN, Dillon Dominguez MD, 1 tablet at 06/16/23 0224    HYDROmorphone injection 0.2 mg, 0.2 mg, Intravenous, Q8H PRN, Dillon Dominguez MD, 0.2 mg at 06/16/23 0638    insulin regular injection 6.94 Units 0.0694 mL, 0.1 Units/kg, Intravenous, PRN, Dominic Armijo MD, 6.94 Units at 06/02/23 2112    levETIRAcetam tablet 500 mg, 500 mg, Oral, BID, Dillon Dominguez MD, 500 mg at 06/15/23 2124    LIDOcaine 5 % patch 1 patch, 1 patch, Transdermal, Q24H, Dillon Dominguze MD, 1 patch at 06/14/23 1653    melatonin tablet 9 mg, 9 mg, Oral, Nightly PRN, Adolfo Turcios DO, 9 mg at 06/15/23 2124    methocarbamoL tablet 750 mg, 750 mg, Oral, TID PRN, Dillon Dominguez MD, 750 mg at 06/15/23 2124    naloxone 0.4 mg/mL injection 0.02 mg, 0.02 mg, Intravenous, PRN, Alda Valladares MD    polyethylene glycol packet 17 g, 17 g, Oral, BID, Dominic Armijo MD, 17 g at 06/14/23 2000    prochlorperazine tablet 10 mg, 10 mg, Oral, TID PRN, Dillon Dominguez MD, 10 mg at 06/08/23 0813    QUEtiapine tablet 100 mg, 100 mg, Oral, Daily, Dillon Dominguez MD, 100 mg at 06/15/23 1030    sodium chloride 0.9% flush 10 mL, 10 mL, Intravenous, Q12H PRN, Alda Valladares MD    tamsulosin 24 hr capsule 0.4 mg, 0.4 mg, Oral, Daily, Dillon Dominguez MD, 0.4 mg at 06/15/23 1030     Imaging:    CT Abdomen Pelvis  Without Contrast   Final Result      1. Anasarca.  There are pleural effusions and diffuse body wall edema.  Perihilar opacities may be related to pulmonary edema, multifocal pneumonia or ARDS.   2. Persistent left nephrogram compatible with acute renal insufficiency.   3. Limited assessment for gastrointestinal bleed in the absence of intravenous contrast         Electronically signed by: Cindy Davey    Date:    06/13/2023   Time:    16:08      X-Ray Chest 1 View   Final Result      Some improvement in the changes of pulmonary vascular congestion and cardiac decompensation.      No other significant change         Electronically signed by: Emmanuel Waldron   Date:    06/12/2023   Time:    08:49      CT Cervical Spine Without Contrast   Final Result      CT Chest Abdomen Pelvis W W/O Contrast (XPD)   Final Result      X-Ray Chest 1 View   Final Result      Persistent pulmonary edema with slight improvement.         Electronically signed by: Eliazar Gibson MD   Date:    06/10/2023   Time:    10:52      X-Ray Chest 1 View   Final Result      Worsening consolidative changes more so on the left than on the right with some worsening also in the right upper lobe.      No other change         Electronically signed by: Emmanuel Waldron   Date:    06/08/2023   Time:    09:25      X-Ray Chest 1 View   Final Result   .   No significant interval change.         Electronically signed by: Adi Amato   Date:    06/07/2023   Time:    07:26      X-Ray Chest 1 View   Final Result      No significant interval change.         Electronically signed by: Ruiz Elam   Date:    06/06/2023   Time:    06:44      X-Ray Chest 1 View   Final Result      No adverse interval change.  Slight interval improvement.         Electronically signed by: Franco Gonzáles   Date:    06/05/2023   Time:    06:43      X-Ray Chest 1 View   Final Result      Unchanged multifocal right greater than left airspace opacities most consistent with multifocal pneumonia.         Electronically signed by: Eliazar Ronquillo MD   Date:    06/03/2023   Time:    18:58      X-Ray Chest 1 View   Final Result      As above.         Electronically signed by: Franco Gonzáles   Date:    06/03/2023   Time:    11:48      X-Ray Chest 1 View   Final Result      Interval placement of dialysis catheter with concern for developing right-sided effusion.  Hemothorax is not entirely excluded.          Electronically signed by: Eliazar Gibson MD   Date:    06/02/2023   Time:    22:26      CT Chest Abdomen Pelvis Without Contrast (XPD)   Final Result      1. Multifocal lung consolidation, likely pneumonia.   2. Small volume ascites with nonspecific gallbladder wall thickening.  Some of the ascitic fluid in the pelvis appears complex.   3. Mild hepatomegaly.         Electronically signed by: Arley Cuevas   Date:    06/02/2023   Time:    18:40      US Retroperitoneal Limited   Final Result      X-Ray Chest AP Portable   Final Result      Findings concerning for right mid to lower lung infectious process.         Electronically signed by: Franco Gonzáles   Date:    06/02/2023   Time:    13:48            Laboratory Data:  Hematology  Lab Results   Component Value Date    WBC 18.05 (H) 06/16/2023    HGB 9.6 (L) 06/16/2023    HCT 29.4 (L) 06/16/2023     06/16/2023     06/16/2023    K 4.0 06/16/2023    CHLORIDE 101 06/16/2023    CO2 28 06/16/2023    BUN 41.8 (H) 06/16/2023    CREATININE 3.74 (H) 06/16/2023    EGFRNORACEVR 21 06/16/2023    GLUCOSE 96 06/16/2023    CALCIUM 8.3 (L) 06/16/2023    ALKPHOS 40 06/16/2023    LABPROT 5.2 (L) 06/16/2023    ALBUMIN 2.1 (L) 06/16/2023    AST 42 (H) 06/16/2023    ALT 74 (H) 06/16/2023    MG 1.90 06/16/2023    PHOS 5.0 (H) 06/16/2023     Lab Results   Component Value Date    FERRITIN 153.40 09/14/2022    LDH 2,294 (H) 06/02/2023       Lab Results   Component Value Date    HIV Nonreactive 09/14/2022    HEPBSURFAG Nonreactive 06/02/2023    HEPBSAB Nonreactive 09/14/2022    HEPBCAB Nonreactive 09/14/2022         Impression  Nonoliguric KINGSLEY, 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  Solitary kidney  Anasarca/pleural effusion  Rhabdomyolysis - resolved  Transaminitis, stable - improving   NSTEMI, undifferentiated  Hypertension  Leukocytosis  History of seizure disorder   Polysubstance abuse with amphetamine/fentanyl  Anabolic steroid use  Treated  hepatitis-C    Plan  Tunneled line placement today per vascular surgery   Will undergo hemodialysis after line is placed today  Continue hemodialysis per Monday, Wednesday, Friday schedule while hospitalized   Outpatient dialysis at [facility] at 12:30 pm  Continue supportive care  Ensure adequate antihypertensive regimen   Disposition per primary after hemodialysis today    Nate Kramer MD, PGY-3   Nephrology

## 2023-06-18 LAB
BACTERIA BLD CULT: NORMAL
BACTERIA BLD CULT: NORMAL

## 2023-06-18 NOTE — PT/OT/SLP DISCHARGE
POST DISCHARGE DOCUMENTATION - 06/18/2023 6:52 AM    Physical Therapy Discharge Summary    Name: Ryan Wild  MRN: [id number]   Principal Problem: Non-traumatic rhabdomyolysis     Methamphetamine/fentanyl overdose  Acute renal failure secondary to rhabdo secondary to above- Improving  Whole-body pain  Low back pain  Complex abdominal fluid collection  Fluid overloaded  Hyperkalemia- Resolved with dialysis  HTN 2/2 above  Pneumonia-community-acquired with possible component of aspiration.  Leukocytosis  Sepsis secondary to above- Resolved  Shock 2/2 above - Resolved  Intracardiac shunting  NSTEMI type 2, however given high peak concern for type 1  Seizure disorder  History of polysubstance use disorder  History of anabolic steroid abuse           Patient Active Problem List   Diagnosis    Hepatitis C virus infection without hepatic coma    Other bipolar disorder    Opioid use disorder, moderate, in sustained remission    Amphetamine use disorder, severe, in sustained remission    KINGSLEY (acute kidney injury)    NSTEMI (non-ST elevated myocardial infarction)    Non-traumatic rhabdomyolysis    Shock    Solitary kidney, congenital    Hyperkalemia    Hypermagnesemia    Elevated levels of transaminase & lactic acid dehydrogenase    Seizure disorder      Recommendations - per last treatment session     Discharge Recommendations:  home   Discharge Equipment Recommendations: none     Assessment:     Refer to prior Physical Therapy note dated 06/15/2023 for last known functional status of patient.    Patient was unexpectedly discharged from hospital.  Refer to therapy's initial evaluation or last treatment note for patient's most recent functional status and goal achievement and therapists' recommendations.       -continued: ambulation, with progression of gait distance/frequency/duration and speed, as tolerated/appropriate, with assistance and supervision (AS ORDERED BY M.D.)    Objective     GOALS: 1 OUT OF 3 STG's met by  patient    Multidisciplinary Problems       Physical Therapy Goals          Problem: Physical Therapy    Goal Priority Disciplines Outcome Goal Variances Interventions   Physical Therapy Goal     PT, PT/OT Ongoing, Progressing     Description: Goals to be met by: DISCHARGE     Patient will increase functional independence with mobility by performin. Supine to sit and sit to supine with Gonzales - MET 2023  2. Sit to stand transfer with Gonzales - NOT MET  3. Gait  x 500 feet with Gonzales using No Assistive Device - NOT MET                       Plan     Patient Discharged to: home or self care per chart.    2023

## 2023-06-19 ENCOUNTER — PATIENT OUTREACH (OUTPATIENT)
Dept: ADMINISTRATIVE | Facility: CLINIC | Age: 33
End: 2023-06-19
Payer: MEDICAID

## 2023-06-19 NOTE — PROGRESS NOTES
Pt mother, Jo Ann left message in [facility] inbox stating pt currently readmitted to hospital at this time. C3 Nurse attempted to return call, no answer, left message w/ callback info for questions/concerns. The patient has a scheduled [facility] appointment with [facility] (Family Medicine); Wednesday Jun 21, 2023 @ 2:00 PM.

## 2023-06-19 NOTE — PROGRESS NOTES
C3 nurse attempted to contact Ryandion Wild  for a [facility] post hospital discharge follow up call. No answer. Left voicemail with callback information. The patient has a scheduled [facility] appointment with [facility] (Family Medicine); Wednesday Jun 21, 2023 @ 2:00 PM.

## 2023-06-27 DIAGNOSIS — N18.9 CHRONIC KIDNEY DISEASE, UNSPECIFIED CKD STAGE: Primary | ICD-10-CM

## 2023-06-28 ENCOUNTER — HOSPITAL ENCOUNTER (OUTPATIENT)
Facility: HOSPITAL | Age: 33
Discharge: HOME | End: 2023-06-29
Attending: STUDENT IN AN ORGANIZED HEALTH CARE EDUCATION/TRAINING PROGRAM | Admitting: INTERNAL MEDICINE
Payer: MEDICAID

## 2023-06-28 DIAGNOSIS — M62.82 NON-TRAUMATIC RHABDOMYOLYSIS: ICD-10-CM

## 2023-06-28 DIAGNOSIS — T82.898A PROBLEM WITH DIALYSIS ACCESS: Primary | ICD-10-CM

## 2023-06-28 DIAGNOSIS — N17.9 AKI (ACUTE KIDNEY INJURY): ICD-10-CM

## 2023-06-28 LAB
ALBUMIN SERPL-MCNC: 3.9 G/DL (ref 3.5–5)
ALBUMIN/GLOB SERPL: 1 RATIO (ref 1.1–2)
ALP SERPL-CCNC: 59 UNIT/L (ref 40–150)
ALT SERPL-CCNC: 42 UNIT/L (ref 0–55)
AST SERPL-CCNC: 31 UNIT/L (ref 5–34)
BASOPHILS # BLD AUTO: 0.01 X10(3)/MCL
BASOPHILS NFR BLD AUTO: 0.1 %
BILIRUBIN DIRECT+TOT PNL SERPL-MCNC: 0.3 MG/DL
BUN SERPL-MCNC: 20.9 MG/DL (ref 8.9–20.6)
CALCIUM SERPL-MCNC: 9.3 MG/DL (ref 8.4–10.2)
CHLORIDE SERPL-SCNC: 110 MMOL/L (ref 98–107)
CK SERPL-CCNC: 694 U/L (ref 30–200)
CO2 SERPL-SCNC: 23 MMOL/L (ref 22–29)
CREAT SERPL-MCNC: 1.89 MG/DL (ref 0.73–1.18)
EOSINOPHIL # BLD AUTO: 0.15 X10(3)/MCL (ref 0–0.9)
EOSINOPHIL NFR BLD AUTO: 1.6 %
ERYTHROCYTE [DISTWIDTH] IN BLOOD BY AUTOMATED COUNT: 13.3 % (ref 11.5–17)
GFR SERPLBLD CREATININE-BSD FMLA CKD-EPI: 47 MLS/MIN/1.73/M2
GLOBULIN SER-MCNC: 4 GM/DL (ref 2.4–3.5)
GLUCOSE SERPL-MCNC: 77 MG/DL (ref 74–100)
HCT VFR BLD AUTO: 32.1 % (ref 42–52)
HGB BLD-MCNC: 10 G/DL (ref 14–18)
IMM GRANULOCYTES # BLD AUTO: 0.03 X10(3)/MCL (ref 0–0.04)
IMM GRANULOCYTES NFR BLD AUTO: 0.3 %
LYMPHOCYTES # BLD AUTO: 1.84 X10(3)/MCL (ref 0.6–4.6)
LYMPHOCYTES NFR BLD AUTO: 19.8 %
MAGNESIUM SERPL-MCNC: 1.6 MG/DL (ref 1.6–2.6)
MCH RBC QN AUTO: 28.5 PG (ref 27–31)
MCHC RBC AUTO-ENTMCNC: 31.2 G/DL (ref 33–36)
MCV RBC AUTO: 91.5 FL (ref 80–94)
MONOCYTES # BLD AUTO: 0.88 X10(3)/MCL (ref 0.1–1.3)
MONOCYTES NFR BLD AUTO: 9.5 %
NEUTROPHILS # BLD AUTO: 6.39 X10(3)/MCL (ref 2.1–9.2)
NEUTROPHILS NFR BLD AUTO: 68.7 %
NRBC BLD AUTO-RTO: 0 %
PHOSPHATE SERPL-MCNC: 3.3 MG/DL (ref 2.3–4.7)
PLATELET # BLD AUTO: 331 X10(3)/MCL (ref 130–400)
PMV BLD AUTO: 9.2 FL (ref 7.4–10.4)
POTASSIUM SERPL-SCNC: 3.8 MMOL/L (ref 3.5–5.1)
PROT SERPL-MCNC: 7.9 GM/DL (ref 6.4–8.3)
RBC # BLD AUTO: 3.51 X10(6)/MCL (ref 4.7–6.1)
SODIUM SERPL-SCNC: 144 MMOL/L (ref 136–145)
WBC # SPEC AUTO: 9.3 X10(3)/MCL (ref 4.5–11.5)

## 2023-06-28 PROCEDURE — 96360 HYDRATION IV INFUSION INIT: CPT

## 2023-06-28 PROCEDURE — G0378 HOSPITAL OBSERVATION PER HR: HCPCS

## 2023-06-28 PROCEDURE — 80053 COMPREHEN METABOLIC PANEL: CPT | Performed by: NURSE PRACTITIONER

## 2023-06-28 PROCEDURE — 82550 ASSAY OF CK (CPK): CPT | Performed by: STUDENT IN AN ORGANIZED HEALTH CARE EDUCATION/TRAINING PROGRAM

## 2023-06-28 PROCEDURE — 85025 COMPLETE CBC W/AUTO DIFF WBC: CPT | Performed by: NURSE PRACTITIONER

## 2023-06-28 PROCEDURE — 84100 ASSAY OF PHOSPHORUS: CPT | Performed by: STUDENT IN AN ORGANIZED HEALTH CARE EDUCATION/TRAINING PROGRAM

## 2023-06-28 PROCEDURE — 87070 CULTURE OTHR SPECIMN AEROBIC: CPT | Mod: 59

## 2023-06-28 PROCEDURE — 25000003 PHARM REV CODE 250: Performed by: STUDENT IN AN ORGANIZED HEALTH CARE EDUCATION/TRAINING PROGRAM

## 2023-06-28 PROCEDURE — 85610 PROTHROMBIN TIME: CPT | Performed by: STUDENT IN AN ORGANIZED HEALTH CARE EDUCATION/TRAINING PROGRAM

## 2023-06-28 PROCEDURE — 99285 EMERGENCY DEPT VISIT HI MDM: CPT | Mod: 25

## 2023-06-28 PROCEDURE — 83735 ASSAY OF MAGNESIUM: CPT | Performed by: STUDENT IN AN ORGANIZED HEALTH CARE EDUCATION/TRAINING PROGRAM

## 2023-06-28 PROCEDURE — 87040 BLOOD CULTURE FOR BACTERIA: CPT | Performed by: STUDENT IN AN ORGANIZED HEALTH CARE EDUCATION/TRAINING PROGRAM

## 2023-06-28 PROCEDURE — 25000003 PHARM REV CODE 250

## 2023-06-28 RX ORDER — HYDRALAZINE HYDROCHLORIDE 20 MG/ML
10 INJECTION INTRAMUSCULAR; INTRAVENOUS EVERY 4 HOURS PRN
Status: DISCONTINUED | OUTPATIENT
Start: 2023-06-28 | End: 2023-06-29 | Stop reason: HOSPADM

## 2023-06-28 RX ORDER — VENLAFAXINE HYDROCHLORIDE 37.5 MG/1
37.5 CAPSULE, EXTENDED RELEASE ORAL EVERY MORNING
Status: DISCONTINUED | OUTPATIENT
Start: 2023-06-29 | End: 2023-06-29 | Stop reason: HOSPADM

## 2023-06-28 RX ORDER — SODIUM CHLORIDE 0.9 % (FLUSH) 0.9 %
10 SYRINGE (ML) INJECTION
Status: DISCONTINUED | OUTPATIENT
Start: 2023-06-28 | End: 2023-06-29 | Stop reason: HOSPADM

## 2023-06-28 RX ORDER — ACETAMINOPHEN 325 MG/1
650 TABLET ORAL EVERY 6 HOURS PRN
Status: DISCONTINUED | OUTPATIENT
Start: 2023-06-28 | End: 2023-06-29 | Stop reason: HOSPADM

## 2023-06-28 RX ORDER — TALC
6 POWDER (GRAM) TOPICAL NIGHTLY PRN
Status: DISCONTINUED | OUTPATIENT
Start: 2023-06-28 | End: 2023-06-29 | Stop reason: HOSPADM

## 2023-06-28 RX ORDER — HEPARIN SODIUM 5000 [USP'U]/ML
5000 INJECTION, SOLUTION INTRAVENOUS; SUBCUTANEOUS EVERY 8 HOURS
Status: DISCONTINUED | OUTPATIENT
Start: 2023-06-28 | End: 2023-06-29 | Stop reason: HOSPADM

## 2023-06-28 RX ORDER — GABAPENTIN 300 MG/1
300 CAPSULE ORAL 3 TIMES DAILY
Status: DISCONTINUED | OUTPATIENT
Start: 2023-06-28 | End: 2023-06-29 | Stop reason: HOSPADM

## 2023-06-28 RX ORDER — QUETIAPINE FUMARATE 100 MG/1
100 TABLET, FILM COATED ORAL NIGHTLY
Status: DISCONTINUED | OUTPATIENT
Start: 2023-06-28 | End: 2023-06-29 | Stop reason: HOSPADM

## 2023-06-28 RX ORDER — LEVETIRACETAM 500 MG/1
500 TABLET ORAL 2 TIMES DAILY
Status: DISCONTINUED | OUTPATIENT
Start: 2023-06-28 | End: 2023-06-29 | Stop reason: HOSPADM

## 2023-06-28 RX ORDER — LABETALOL HCL 20 MG/4 ML
10 SYRINGE (ML) INTRAVENOUS EVERY 4 HOURS PRN
Status: DISCONTINUED | OUTPATIENT
Start: 2023-06-28 | End: 2023-06-29 | Stop reason: HOSPADM

## 2023-06-28 RX ORDER — ALBUTEROL SULFATE 90 UG/1
2 AEROSOL, METERED RESPIRATORY (INHALATION) EVERY 6 HOURS PRN
Status: DISCONTINUED | OUTPATIENT
Start: 2023-06-28 | End: 2023-06-29 | Stop reason: HOSPADM

## 2023-06-28 RX ORDER — FLUTICASONE PROPIONATE 50 MCG
2 SPRAY, SUSPENSION (ML) NASAL DAILY
Status: DISCONTINUED | OUTPATIENT
Start: 2023-06-29 | End: 2023-06-29 | Stop reason: HOSPADM

## 2023-06-28 RX ORDER — LOSARTAN POTASSIUM 25 MG/1
50 TABLET ORAL DAILY
Status: DISCONTINUED | OUTPATIENT
Start: 2023-06-28 | End: 2023-06-29 | Stop reason: HOSPADM

## 2023-06-28 RX ADMIN — GABAPENTIN 300 MG: 300 CAPSULE ORAL at 08:06

## 2023-06-28 RX ADMIN — LEVETIRACETAM 500 MG: 500 TABLET, FILM COATED ORAL at 08:06

## 2023-06-28 RX ADMIN — SODIUM CHLORIDE 1000 ML: 9 INJECTION, SOLUTION INTRAVENOUS at 07:06

## 2023-06-28 RX ADMIN — ACETAMINOPHEN 650 MG: 325 TABLET ORAL at 09:06

## 2023-06-28 RX ADMIN — LOSARTAN POTASSIUM 50 MG: 25 TABLET, FILM COATED ORAL at 09:06

## 2023-06-28 RX ADMIN — QUETIAPINE FUMARATE 100 MG: 100 TABLET ORAL at 08:06

## 2023-06-28 NOTE — ED PROVIDER NOTES
Encounter Date: 6/28/2023       History     Chief Complaint   Patient presents with    Vascular Access Problem     C/o right chest tunnel cath bleeding, stitches popped. States cath sliding down from where it was placed.      32-year-old male presents to ED after his dialysis tunneled cath sutures accidentally detached and his catheterization was pulled out.  States it made him nervous so he pushed it partially back in.  Has no complaints.  Denies any pain at this site, no fevers or chills, is regularly making urine.  Has not had dialysis since hospital discharge.  States the tunnel catheter was placed initially several weeks ago.  States he was told they would potentially remove the catheter recently.  Denies any discharge or redness at the insertion site.  No other complaints or concerns at this time.    Review of patient's allergies indicates:  No Known Allergies  Past Medical History:   Diagnosis Date    Depression     Opioid abuse     Seizures     Solitary kidney, congenital     Unspecified viral hepatitis C without hepatic coma      Past Surgical History:   Procedure Laterality Date    INSERTION OF TUNNELED CENTRAL VENOUS HEMODIALYSIS CATHETER N/A 6/16/2023    Procedure: Insertion, Catheter, Central Venous, Hemodialysis;  Surgeon: Eliazar Lopes III, MD;  Location: [facility] CATH LAB;  Service: Peripheral Vascular;  Laterality: N/A;    TONSILLECTOMY       Family History   Problem Relation Age of Onset    Cerebral aneurysm Father      Social History     Tobacco Use    Smoking status: Every Day     Types: Vaping with nicotine    Smokeless tobacco: Current   Substance Use Topics    Alcohol use: Not Currently    Drug use: Not Currently     Types: Heroin, Methamphetamines     Comment: 3 years sober     Review of Systems   Constitutional:  Negative for chills and fever.   HENT:  Negative for congestion, rhinorrhea and sore throat.    Eyes:  Negative for pain, discharge and itching.   Respiratory:  Negative for chest  tightness and shortness of breath.    Cardiovascular:  Negative for chest pain and palpitations.        Right chest wall tunneled cath removal   Gastrointestinal:  Negative for abdominal pain, nausea and vomiting.   Genitourinary:  Negative for dysuria and hematuria.   Musculoskeletal:  Negative for myalgias and neck pain.   Skin:  Negative for color change and rash.   Neurological:  Negative for dizziness, weakness and headaches.   Psychiatric/Behavioral:  Negative for confusion. The patient is not hyperactive.      Physical Exam     Initial Vitals [06/28/23 1639]   BP Pulse Resp Temp SpO2   (!) 158/89 94 20 99 °F (37.2 °C) 100 %      MAP       --         Physical Exam    Constitutional: He appears well-developed and well-nourished. He is not diaphoretic. No distress.   Very physically fit.   HENT:   Head: Normocephalic and atraumatic.   Eyes: Conjunctivae and EOM are normal. Pupils are equal, round, and reactive to light.   Neck: Neck supple. No tracheal deviation present.   Normal range of motion.  Cardiovascular:  Normal rate, regular rhythm and normal heart sounds.           Pulmonary/Chest: Breath sounds normal. No respiratory distress.     Abdominal: Abdomen is soft. There is no abdominal tenderness. There is no rebound.   Musculoskeletal:         General: No tenderness. Normal range of motion.      Cervical back: Normal range of motion and neck supple.     Neurological: He is alert and oriented to person, place, and time. He has normal strength. GCS score is 15. GCS eye subscore is 4. GCS verbal subscore is 5. GCS motor subscore is 6.   Skin: Skin is warm and dry. Capillary refill takes less than 2 seconds. No rash noted.   Psychiatric: He has a normal mood and affect. His behavior is normal. Judgment and thought content normal.       ED Course   Critical Care    Date/Time: 6/28/2023 1:06 PM  Performed by: Gary Jerez MD  Authorized by: Gary Jerez MD   Total critical care time (exclusive of procedural  time) : 30 minutes  Critical care time was exclusive of separately billable procedures and treating other patients.  Critical care was time spent personally by me on the following activities: evaluation of patient's response to treatment, obtaining history from patient or surrogate, ordering and review of laboratory studies, pulse oximetry, review of old charts, development of treatment plan with patient or surrogate, examination of patient, ordering and performing treatments and interventions, ordering and review of radiographic studies and re-evaluation of patient's condition.  Comments: rhabo      Labs Reviewed   COMPREHENSIVE METABOLIC PANEL - Abnormal; Notable for the following components:       Result Value    Chloride 110 (*)     Blood Urea Nitrogen 20.9 (*)     Creatinine 1.89 (*)     Globulin 4.0 (*)     Albumin/Globulin Ratio 1.0 (*)     All other components within normal limits   CBC WITH DIFFERENTIAL - Abnormal; Notable for the following components:    RBC 3.51 (*)     Hgb 10.0 (*)     Hct 32.1 (*)     MCHC 31.2 (*)     All other components within normal limits   CK - Abnormal; Notable for the following components:    Creatine Kinase 694 (*)     All other components within normal limits   MAGNESIUM - Normal   PHOSPHORUS - Normal   BLOOD CULTURE OLG   BLOOD CULTURE OLG   CBC W/ AUTO DIFFERENTIAL    Narrative:     The following orders were created for panel order CBC auto differential.  Procedure                               Abnormality         Status                     ---------                               -----------         ------                     CBC with Differential[[id number]]        Abnormal            Final result                 Please view results for these tests on the individual orders.   PROTIME-INR   EXTRA TUBES    Narrative:     The following orders were created for panel order EXTRA TUBES.  Procedure                               Abnormality         Status                      ---------                               -----------         ------                     Light Blue Top Hold[[id number]]                              In process                 Gold Top Hold[[id number]]                                    In process                 Pink Top Hold[[id number]]                                    In process                   Please view results for these tests on the individual orders.   LIGHT BLUE TOP HOLD   GOLD TOP HOLD   PINK TOP HOLD          Imaging Results              X-Ray Chest PA And Lateral (Final result)  Result time 06/28/23 18:00:06      Final result by Arley Cuevas MD (06/28/23 18:00:06)                   Impression:      Right-sided central venous catheter tip over the SVC.  Patchy right perihilar opacity, improved since 06/12/2023.      Electronically signed by: Arley Cuevas  Date:    06/28/2023  Time:    18:00               Narrative:    EXAMINATION:  XR CHEST PA AND LATERAL    CLINICAL HISTORY:  Other specified complication of vascular prosthetic devices, implants and grafts, initial encounter    TECHNIQUE:  PA and lateral radiographs of the chest    COMPARISON:  Radiography 06/12/2023    FINDINGS:  Right-sided central venous catheter tip overlies the mid SVC.  Normal cardiac silhouette.  The lungs are well-inflated. Minimal patchy right perihilar opacity, improved since prior study.  No pleural effusion or discernible pneumothorax.                                       Medications   sodium chloride 0.9% bolus 1,000 mL 1,000 mL (1,000 mLs Intravenous New Bag 6/28/23 1923)     Medical Decision Making:   History:   Old Medical Records: I decided to obtain old medical records.  Initial Assessment:   32-year-old male presents after his tunneled HD cath was partially removed and reinserted  Differential Diagnosis:   Device failure  Acute kidney injury  Renal failure  Rhabdomyolysis  Electrolyte derangement  Clinical Tests:   Lab Tests: Ordered and Reviewed  Radiological  Study: Reviewed and Ordered  ED Management:  Patient in no distress.  Vital stable.  Tunneled cath is at least 6 in withdrawn.  Patient reporting it was previously further displaced prior to reinsertion.  Ultimately catheter has to be removed given he reintroduce to nonsterile device into his body.  Cultures obtained.  Spoke with surgery who were willing to come in and removed the catheter.  Medicine requested admission overnight for observation.  Care transition.  Labs demonstrated an up trending creatinine from 1.6 two days ago to 1.9 today.  Continues to have elevated CPK at 600. Fluids given. At this time no other complaints or concerns. (Solitario)            ED Course as of 06/28/23 1938 Wed Jun 28, 2023 1717 Spoke directly with surgery on-call.  Requested to be notified once chest x-ray is back and Nephrology's recommendation concerning access [RZ]   1900 Spoke with medicine team.  Are requesting observation admission of the patient to monitor overnight.    Surgery informed patient would not need emergent dialysis but the line would have to be removed since it became nonsterile and was reinserted.  Stated would come in and remove the line. [RZ]      ED Course User Index  [RZ] Gary Jerez MD                 Clinical Impression:   Final diagnoses:  [T82.898A] Problem with dialysis access (Primary)  [N17.9] KINGSLEY (acute kidney injury)  [M62.82] Non-traumatic rhabdomyolysis        ED Disposition Condition    Observation Stable                Gary Jerez MD  06/28/23 1941       Gary Jerez MD  07/06/23 1304

## 2023-06-28 NOTE — FIRST PROVIDER EVALUATION
"Medical screening examination initiated.  I have conducted a focused provider triage encounter, findings are as follows:    Brief history of present illness:  Pt is a 32 y.o. male who reports his dialysis chest catheter as started to "hang out" of his chest. Symptoms for the last few days after the stitches to areas broke.     There were no vitals filed for this visit.    Pertinent physical exam:      Brief workup plan:  Diagnostic testing    Preliminary workup initiated; this workup will be continued and followed by the physician or advanced practice provider that is assigned to the patient when roomed.  "

## 2023-06-29 VITALS
DIASTOLIC BLOOD PRESSURE: 87 MMHG | BODY MASS INDEX: 22.84 KG/M2 | OXYGEN SATURATION: 98 % | HEIGHT: 67 IN | SYSTOLIC BLOOD PRESSURE: 151 MMHG | WEIGHT: 145.5 LBS | HEART RATE: 79 BPM | RESPIRATION RATE: 18 BRPM | TEMPERATURE: 98 F

## 2023-06-29 PROBLEM — T82.898A PROBLEM WITH DIALYSIS ACCESS: Status: ACTIVE | Noted: 2023-06-29

## 2023-06-29 LAB
ALBUMIN SERPL-MCNC: 3 G/DL (ref 3.5–5)
ALBUMIN/GLOB SERPL: 0.9 RATIO (ref 1.1–2)
ALP SERPL-CCNC: 53 UNIT/L (ref 40–150)
ALT SERPL-CCNC: 32 UNIT/L (ref 0–55)
AST SERPL-CCNC: 23 UNIT/L (ref 5–34)
BASOPHILS # BLD AUTO: 0.01 X10(3)/MCL
BASOPHILS NFR BLD AUTO: 0.1 %
BILIRUBIN DIRECT+TOT PNL SERPL-MCNC: 0.3 MG/DL
BUN SERPL-MCNC: 21.9 MG/DL (ref 8.9–20.6)
CALCIUM SERPL-MCNC: 8.8 MG/DL (ref 8.4–10.2)
CHLORIDE SERPL-SCNC: 112 MMOL/L (ref 98–107)
CO2 SERPL-SCNC: 22 MMOL/L (ref 22–29)
CREAT SERPL-MCNC: 1.39 MG/DL (ref 0.73–1.18)
EOSINOPHIL # BLD AUTO: 0.44 X10(3)/MCL (ref 0–0.9)
EOSINOPHIL NFR BLD AUTO: 5.2 %
ERYTHROCYTE [DISTWIDTH] IN BLOOD BY AUTOMATED COUNT: 13.4 % (ref 11.5–17)
GFR SERPLBLD CREATININE-BSD FMLA CKD-EPI: >60 MLS/MIN/1.73/M2
GLOBULIN SER-MCNC: 3.3 GM/DL (ref 2.4–3.5)
GLUCOSE SERPL-MCNC: 98 MG/DL (ref 74–100)
HCT VFR BLD AUTO: 28.5 % (ref 42–52)
HGB BLD-MCNC: 9.3 G/DL (ref 14–18)
IMM GRANULOCYTES # BLD AUTO: 0.05 X10(3)/MCL (ref 0–0.04)
IMM GRANULOCYTES NFR BLD AUTO: 0.6 %
LYMPHOCYTES # BLD AUTO: 2.39 X10(3)/MCL (ref 0.6–4.6)
LYMPHOCYTES NFR BLD AUTO: 28.4 %
MCH RBC QN AUTO: 29.2 PG (ref 27–31)
MCHC RBC AUTO-ENTMCNC: 32.6 G/DL (ref 33–36)
MCV RBC AUTO: 89.6 FL (ref 80–94)
MONOCYTES # BLD AUTO: 1.02 X10(3)/MCL (ref 0.1–1.3)
MONOCYTES NFR BLD AUTO: 12.1 %
NEUTROPHILS # BLD AUTO: 4.5 X10(3)/MCL (ref 2.1–9.2)
NEUTROPHILS NFR BLD AUTO: 53.6 %
NRBC BLD AUTO-RTO: 0 %
PLATELET # BLD AUTO: 283 X10(3)/MCL (ref 130–400)
PMV BLD AUTO: 9.5 FL (ref 7.4–10.4)
POTASSIUM SERPL-SCNC: 4.1 MMOL/L (ref 3.5–5.1)
PROT SERPL-MCNC: 6.3 GM/DL (ref 6.4–8.3)
RBC # BLD AUTO: 3.18 X10(6)/MCL (ref 4.7–6.1)
SODIUM SERPL-SCNC: 143 MMOL/L (ref 136–145)
WBC # SPEC AUTO: 8.41 X10(3)/MCL (ref 4.5–11.5)

## 2023-06-29 PROCEDURE — 25000242 PHARM REV CODE 250 ALT 637 W/ HCPCS

## 2023-06-29 PROCEDURE — 85025 COMPLETE CBC W/AUTO DIFF WBC: CPT

## 2023-06-29 PROCEDURE — G0378 HOSPITAL OBSERVATION PER HR: HCPCS

## 2023-06-29 PROCEDURE — 25000003 PHARM REV CODE 250

## 2023-06-29 PROCEDURE — 80053 COMPREHEN METABOLIC PANEL: CPT

## 2023-06-29 PROCEDURE — 94761 N-INVAS EAR/PLS OXIMETRY MLT: CPT

## 2023-06-29 RX ORDER — GABAPENTIN 300 MG/1
300 CAPSULE ORAL 3 TIMES DAILY
Qty: 90 CAPSULE | Refills: 1 | Status: SHIPPED | OUTPATIENT
Start: 2023-06-29 | End: 2023-08-28

## 2023-06-29 RX ORDER — LOSARTAN POTASSIUM 50 MG/1
50 TABLET ORAL DAILY
Qty: 90 TABLET | Refills: 3 | Status: SHIPPED | OUTPATIENT
Start: 2023-06-30 | End: 2024-06-29

## 2023-06-29 RX ADMIN — LEVETIRACETAM 500 MG: 500 TABLET, FILM COATED ORAL at 09:06

## 2023-06-29 RX ADMIN — VENLAFAXINE HYDROCHLORIDE 37.5 MG: 37.5 CAPSULE, EXTENDED RELEASE ORAL at 06:06

## 2023-06-29 RX ADMIN — GABAPENTIN 300 MG: 300 CAPSULE ORAL at 09:06

## 2023-06-29 RX ADMIN — LOSARTAN POTASSIUM 50 MG: 25 TABLET, FILM COATED ORAL at 09:06

## 2023-06-29 RX ADMIN — FLUTICASONE PROPIONATE 100 MCG: 50 SPRAY, METERED NASAL at 09:06

## 2023-06-29 NOTE — PLAN OF CARE
Problem: Adult Inpatient Plan of Care  Goal: Plan of Care Review  Outcome: Ongoing, Progressing  Goal: Patient-Specific Goal (Individualized)  Outcome: Ongoing, Progressing  Goal: Absence of Hospital-Acquired Illness or Injury  Outcome: Ongoing, Progressing  Goal: Optimal Comfort and Wellbeing  Outcome: Ongoing, Progressing  Goal: Readiness for Transition of Care  Outcome: Ongoing, Progressing     Problem: Adjustment to Illness (Sepsis/Septic Shock)  Goal: Optimal Coping  Outcome: Ongoing, Progressing     Problem: Bleeding (Sepsis/Septic Shock)  Goal: Absence of Bleeding  Outcome: Ongoing, Progressing     Problem: Glycemic Control Impaired (Sepsis/Septic Shock)  Goal: Blood Glucose Level Within Desired Range  Outcome: Ongoing, Progressing     Problem: Infection Progression (Sepsis/Septic Shock)  Goal: Absence of Infection Signs and Symptoms  Outcome: Ongoing, Progressing     Problem: Nutrition Impaired (Sepsis/Septic Shock)  Goal: Optimal Nutrition Intake  Outcome: Ongoing, Progressing     Problem: Fluid and Electrolyte Imbalance (Acute Kidney Injury/Impairment)  Goal: Fluid and Electrolyte Balance  Outcome: Ongoing, Progressing     Problem: Oral Intake Inadequate (Acute Kidney Injury/Impairment)  Goal: Optimal Nutrition Intake  Outcome: Ongoing, Progressing     Problem: Renal Function Impairment (Acute Kidney Injury/Impairment)  Goal: Effective Renal Function  Outcome: Ongoing, Progressing     Problem: Fluid Imbalance (Pneumonia)  Goal: Fluid Balance  Outcome: Ongoing, Progressing     Problem: Infection (Pneumonia)  Goal: Resolution of Infection Signs and Symptoms  Outcome: Ongoing, Progressing     Problem: Respiratory Compromise (Pneumonia)  Goal: Effective Oxygenation and Ventilation  Outcome: Ongoing, Progressing     Problem: Infection  Goal: Absence of Infection Signs and Symptoms  Outcome: Ongoing, Progressing

## 2023-06-29 NOTE — H&P
[facility]   History & Physical Note     Resident Team: [facility]  Attending Physician: Alexander Puente MD  Resident: Zofia  Intern: Sita     Date of Admit: 6/28/2023    Chief Complaint:     Vascular Access Problem (C/o right chest tunnel cath bleeding, stitches popped. States cath sliding down from where it was placed. )       Subjective:      History of Present Illness:  Ryan Wild is a 32 y.o. male with a history of seizures, drug use, hepatitis C (treated), and solitary kidney who presented to [facility] ED on 6/28/2023  when his dialysis catheter had slid out while he was at work.  At that time, patient had been pushed the catheter back into place.  He then presented to the ED for further evaluation. Before presentation, patient reports he has not been receiving dialysis recently as his labs had been improving since his last admission. In the ED, patient reports mild tingling type pain around the site of the dialysis catheter.  On evaluation there was noted white drainage coming from dialysis catheter site.  Area around the dialysis catheter site was also slightly erythematous. Patient denies any fevers, chills, chest pain, abdominal pain, dysuria, constipation or diarrhea at this time.  Of note, patient was previously admitted to the hospital earlier this month due to drug overdose along with rhabdomyolysis due to above.  During that time, patient was admitted to ICU and had required emergent HD dialysis.  He was also on pressors and BiPAP due to suspected aspiration pneumonia.  Patient's condition improved during admission and he was able to be discharged home on dialysis.    In the ED, patient had temperature 99°, pulse 94, respiratory rate 20, /89, and saturating 100% on room air.  CBC was notable for H/H 10/32.1 with normal WBC 9.3. CMP was notable for BUN/creatinine 20.9/1.89.  Of note CPK was obtained in the ED that was elevated at 694.  CXR in the ED showed right sided  central venous catheter and improved right perihilar opacity from previous imaging.    Past Medical History:   has a past medical history of Depression, Opioid abuse, Seizures, Solitary kidney, congenital, and Unspecified viral hepatitis C without hepatic coma.     Past Surgical History:   has a past surgical history that includes Tonsillectomy and Insertion of tunneled central venous hemodialysis catheter (N/A, 6/16/2023).     Family History:  family history includes Cerebral aneurysm in his father.     Social History:   reports that he has been smoking vaping with nicotine. He uses smokeless tobacco. He reports that he does not currently use alcohol. He reports that he does not currently use drugs after having used the following drugs: Heroin and Methamphetamines.     Allergies:  has No Known Allergies.     Home Medications:  Prior to Admission medications    Medication Sig Start Date End Date Taking? Authorizing Provider   albuterol (PROVENTIL/VENTOLIN HFA) 90 mcg/actuation inhaler INHALE 2 PUFFS BY MOUTH EVERY 6 HOURS AS NEEDED FOR SHORTNESS OF BREATH OR WHEEZING 11/25/22   Historical Provider   fluticasone propionate (FLONASE) 50 mcg/actuation nasal spray SHAKE LIQUID AND USE 2 SPRAYS IN EACH NOSTRIL DAILY 11/25/22   Historical Provider   levETIRAcetam (KEPPRA) 500 MG Tab Take 1 tablet by mouth 2 (two) times daily. 8/18/22   Historical Provider   QUEtiapine (SEROQUEL) 100 MG Tab Take 1 tablet (100 mg total) by mouth nightly. 7/21/22   Brian Velasquez MD   valACYclovir (VALTREX) 1000 MG tablet Take 1,000 mg by mouth 3 (three) times daily. 2/8/23   Historical Provider   venlafaxine (EFFEXOR-XR) 37.5 MG 24 hr capsule Take 37.5 mg by mouth every morning. 5/25/23   Historical Provider       Review of Systems:  Review of Systems   Constitutional:  Negative for chills and fever.   HENT:  Negative for congestion, sinus pain and sore throat.    Eyes:  Negative for blurred vision and double vision.   Respiratory:   Positive for shortness of breath. Negative for cough, sputum production and wheezing.    Cardiovascular:  Negative for chest pain, palpitations and leg swelling.   Gastrointestinal:  Negative for abdominal pain, nausea and vomiting.   Genitourinary:  Negative for dysuria.   Musculoskeletal:         Tingling in bilateral lower extremities   Neurological:  Negative for dizziness, focal weakness, seizures and weakness.      Objective:     Vital Signs (Most Recent):  Temp: 99 °F (37.2 °C) (06/28/23 1639)  Pulse: 72 (06/28/23 1931)  Resp: 20 (06/28/23 1639)  BP: (!) 166/105 (06/28/23 1931)  SpO2: 100 % (06/28/23 1931) Vital Signs (24h Range):  Temp:  [99 °F (37.2 °C)] 99 °F (37.2 °C)  Pulse:  [72-94] 72  Resp:  [20] 20  SpO2:  [100 %] 100 %  BP: (158-166)/() 166/105     Physical Examination:  Physical Exam  Constitutional:       Appearance: Normal appearance.   HENT:      Head: Normocephalic and atraumatic.      Nose: Nose normal.      Mouth/Throat:      Mouth: Mucous membranes are moist.      Pharynx: Oropharynx is clear.   Eyes:      Conjunctiva/sclera: Conjunctivae normal.      Pupils: Pupils are equal, round, and reactive to light.   Cardiovascular:      Rate and Rhythm: Normal rate and regular rhythm.      Pulses: Normal pulses.      Heart sounds: No murmur heard.  Pulmonary:      Effort: Pulmonary effort is normal. No respiratory distress.      Breath sounds: No wheezing.   Chest:      Chest wall: No tenderness.   Abdominal:      General: Abdomen is flat. Bowel sounds are normal. There is no distension.      Palpations: Abdomen is soft.      Tenderness: There is no abdominal tenderness.   Musculoskeletal:         General: No swelling. Normal range of motion.      Cervical back: Normal range of motion.   Skin:     General: Skin is warm.      Comments: Right IJ dialysis catheter in place in right chest with white drainage from site; area around dialysis catheter slightly erythematous and slightly tender to  palpation   Neurological:      General: No focal deficit present.      Mental Status: He is alert and oriented to person, place, and time.        Laboratory:  Most Recent Data:  CBC:   Recent Labs   Lab 06/28/23  1710   WBC 9.30   HGB 10.0*   HCT 32.1*        CMP:   Recent Labs   Lab 06/28/23  1710   CALCIUM 9.3   ALBUMIN 3.9      K 3.8   CO2 23   BUN 20.9*   CREATININE 1.89*   ALKPHOS 59   ALT 42   AST 31   BILITOT 0.3         Microbiology Data:  Blood and wound cultures pending    Other Results:    Radiology:  Imaging Results              X-Ray Chest PA And Lateral (Final result)  Result time 06/28/23 18:00:06      Final result by Arley Cuevas MD (06/28/23 18:00:06)                   Impression:      Right-sided central venous catheter tip over the SVC.  Patchy right perihilar opacity, improved since 06/12/2023.      Electronically signed by: Arley Cuevas  Date:    06/28/2023  Time:    18:00               Narrative:    EXAMINATION:  XR CHEST PA AND LATERAL    CLINICAL HISTORY:  Other specified complication of vascular prosthetic devices, implants and grafts, initial encounter    TECHNIQUE:  PA and lateral radiographs of the chest    COMPARISON:  Radiography 06/12/2023    FINDINGS:  Right-sided central venous catheter tip overlies the mid SVC.  Normal cardiac silhouette.  The lungs are well-inflated. Minimal patchy right perihilar opacity, improved since prior study.  No pleural effusion or discernible pneumothorax.                                       Lines/Drains/Airways       Central Venous Catheter Line  Duration                  Hemodialysis Catheter 06/16/23 1127 right internal jugular 12 days              Peripheral Intravenous Line  Duration                  Peripheral IV - Single Lumen 06/28/23 1920 18 G Anterior;Left Forearm <1 day                     Assessment & Plan:     Dialysis tunneled catheter misplacement  Acute renal failure secondary to rhabdo   - Reports dialysis catheter  slide out while he was at work and he had replaced it himself at that time  - Area around dialysis catheter site red, slightly tender to palpation, with white drainage from site  - Has not had dialysis from site in his last 2 visits due to improved renal function on labs  - Consult General Surgery for catheter removal  - Consider Nephrology consult in AM about necessity of dialysis and if catheter needs to be replaced at this time  - Blood cultures drawn in the ED  - Wound cultures ordered from dialysis site     Rhabdomyolysis   - Previously admitted due to rhabdomyolysis secondary due to drug overdose  - CPK in the ED was 694  - CPK was 459 on admission; previous peak 37k  - Received 1L NS bolus in the ED    Hx of seizures  - On home Keppra 500 mg   - Will restart while admitted    Peripheral neuropathy  - Reports pain and tingling in back and bottom of feet  - Previously was taking Gabapentin last admission  - Will give low dose Gabapentin at this time     HTN  - BP elevated 158/89 in the ED  - BP was also elevated during dialysis session outpatient and last admission as well  - Will start on Losartan at this time  - PRN Hydralazine and Labetalol in place    Shortness of breath  - Reports SOB since discharge primarily with exertion  - Currently saturating 100% on room air at this time  - Will need pulmonary and cardiac work up outpatient      CODE STATUS: Full Code  Access: Peripheral  Antibiotics: N/A  Diet: Renal  DVT Prophylaxis: Heparin  GI Prophylaxis: none needed  Fluids: none    Disposition: day 0 of admission for dialysis catheter assessment. General surgery consulted for dialysis catheter removal. Will obtain blood and wound cultures from dialysis catheter site. Monitor overnight for signs of infection.     Boni Buckner MD  [facility], -1

## 2023-06-29 NOTE — PROGRESS NOTES
"Inpatient Nutrition Evaluation    Admit Date: 2023   Total duration of encounter: 1 day    Nutrition Recommendation/Prescription     Low Na diet  Monitor Weights Weekly     Nutrition Assessment     Chart Review    Reason Seen: continuous nutrition monitoring    Malnutrition Screening Tool Results   Have you recently lost weight without trying?: No  Have you been eating poorly because of a decreased appetite?: No   MST Score: 0     Diagnosis:  Partial dislodgement of HD line, Acute Renal failure d/t rhabdo    Relevant Medical History: polysubstance abuse, Recent Rhabdo, Depression, Seizures, Congenital Solitary kidney     Nutrition-Related Medications: Losartan    Nutrition-Related Labs:  23 -- H/H 9.3/28.5 L, Glu 98, K 4.1, BUN 21.9 H, Cr 1.3 H    Diet Order: Diet Low Sodium, 2gm  Oral Supplement Order: none  Appetite/Oral Intake: good/% of meals  Factors Affecting Nutritional Intake: none identified  Food/Temple/Cultural Preferences: none reported  Food Allergies: none reported       Wound(s):   skin intact    Comments    23 -- Pt reports good appetite, denies difficulty eating; no n/v or abdominal pain; LBM ; BUN/Cr (H) - h/o Acute renal failure, received HD x 1 previously has not required HD since; Suggest Low Na diet    Anthropometrics    Height: 5' 7" (170.2 cm)    Last Weight: 66 kg (145 lb 8.1 oz) (23 0717) Weight Method: Standard Scale  BMI (Calculated): 22.8  BMI Classification: normal (BMI 18.5-24.9)        Ideal Body Weight (IBW), Male: 148 lb     % Ideal Body Weight, Male (lb): 98.32 %                 Usual Body Weight (UBW), k kg  % Usual Body Weight: 103.34     Usual Weight Provided By: EMR weight history    Wt Readings from Last 5 Encounters:   23 66 kg (145 lb 8.1 oz)   23 67.9 kg (149 lb 11.1 oz)   06/15/23 83.5 kg (184 lb)   23 61.7 kg (136 lb)   10/10/22 63.3 kg (139 lb 8 oz)     Weight Change(s) Since Admission:  Admit Weight: 66 kg (145 lb " 8.1 oz) (06/28/23 7327)      Patient Education    Not applicable.    Monitoring & Evaluation     Dietitian will monitor food and beverage intake, weight change, and electrolyte/renal panel.  Nutrition Risk/Follow-Up: low (follow-up in 5-7 days)  Patients assigned 'low nutrition risk' status do not qualify for a full nutritional assessment but will be monitored and re-evaluated in a 5-7 day time period. Please consult if re-evaluation needed sooner.

## 2023-06-29 NOTE — CONSULTS
[facility]   Consult Note  General Surgery    Patient Name: Ryan Wild  YOB: 1990  Date of Admission: 6/28/2023  Date: 06/29/2023 7:30 AM  Reason for Consult: Partially pulled back HD line      PRESENTING HISTORY       History of Present Illness:  Patient is a 32M w/hx of polysubstance abuse and recent hospitalization for rhabdomyolysis who presented to the ER yesterday evening due to dislodged tunneled HD line. Patient states that he was at work earlier in the day when he accidentally pulled on his line causing it to come out. He states he has used it once for HD since being discharged from the hospital almost 2 weeks ago. Denies any fevers, chills, nausea, emesis.     Review of Systems:  12 point ROS negative except as stated in HPI    PAST HISTORY:   Past medical history:  Past Medical History:   Diagnosis Date    Depression     Opioid abuse     Seizures     Solitary kidney, congenital     Unspecified viral hepatitis C without hepatic coma        Past surgical history:  Past Surgical History:   Procedure Laterality Date    INSERTION OF TUNNELED CENTRAL VENOUS HEMODIALYSIS CATHETER N/A 6/16/2023    Procedure: Insertion, Catheter, Central Venous, Hemodialysis;  Surgeon: Eliazar Lopes III, MD;  Location: [facility] CATH LAB;  Service: Peripheral Vascular;  Laterality: N/A;    TONSILLECTOMY         Family history:  Family History   Problem Relation Age of Onset    Cerebral aneurysm Father        Social history:  Social History     Socioeconomic History    Marital status: Single   Tobacco Use    Smoking status: Every Day     Types: Vaping with nicotine    Smokeless tobacco: Current   Substance and Sexual Activity    Alcohol use: Not Currently    Drug use: Not Currently     Types: Heroin, Methamphetamines     Comment: 3 years sober    Sexual activity: Yes     Partners: Female     Social Determinants of Health     Food Insecurity: No Food Insecurity    Worried About Running Out of Food in the Last  Year: Never true    Ran Out of Food in the Last Year: Never true   Transportation Needs: Unknown    Lack of Transportation (Medical): No   Physical Activity: Sufficiently Active    Days of Exercise per Week: 5 days    Minutes of Exercise per Session: 120 min   Stress: No Stress Concern Present    Feeling of Stress : Not at all   Social Connections: Socially Isolated    Frequency of Communication with Friends and Family: More than three times a week    Frequency of Social Gatherings with Friends and Family: More than three times a week    Attends Mosque Services: Never    Active Member of Clubs or Organizations: No    Attends Club or Organization Meetings: Never    Marital Status:    Housing Stability: Low Risk     Unable to Pay for Housing in the Last Year: No    Number of Places Lived in the Last Year: 1    Unstable Housing in the Last Year: No     Social History     Tobacco Use   Smoking Status Every Day    Types: Vaping with nicotine   Smokeless Tobacco Current         MEDICATIONS & ALLERGIES:   Allergies: Review of patient's allergies indicates:  No Known Allergies    No current facility-administered medications on file prior to encounter.     Current Outpatient Medications on File Prior to Encounter   Medication Sig    albuterol (PROVENTIL/VENTOLIN HFA) 90 mcg/actuation inhaler INHALE 2 PUFFS BY MOUTH EVERY 6 HOURS AS NEEDED FOR SHORTNESS OF BREATH OR WHEEZING    fluticasone propionate (FLONASE) 50 mcg/actuation nasal spray SHAKE LIQUID AND USE 2 SPRAYS IN EACH NOSTRIL DAILY    levETIRAcetam (KEPPRA) 500 MG Tab Take 1 tablet by mouth 2 (two) times daily.    QUEtiapine (SEROQUEL) 100 MG Tab Take 1 tablet (100 mg total) by mouth nightly.    valACYclovir (VALTREX) 1000 MG tablet Take 1,000 mg by mouth 3 (three) times daily.    venlafaxine (EFFEXOR-XR) 37.5 MG 24 hr capsule Take 37.5 mg by mouth every morning.       Scheduled Meds:   fluticasone propionate  2 spray Each Nostril Daily    gabapentin  300  mg Oral TID    heparin (porcine)  5,000 Units Subcutaneous Q8H    levETIRAcetam  500 mg Oral BID    losartan  50 mg Oral Daily    QUEtiapine  100 mg Oral Nightly    venlafaxine  37.5 mg Oral QAM       Continuous Infusions:    PRN Meds:acetaminophen, albuterol, hydrALAZINE, labetalol, melatonin, sodium chloride 0.9%    OBJECTIVE:     Vital Signs:  Temp:  [97.7 °F (36.5 °C)-99 °F (37.2 °C)] 98.4 °F (36.9 °C)  Pulse:  [72-95] 79  Resp:  [20] 20  SpO2:  [96 %-100 %] 98 %  BP: (139-166)/() 151/87  Body mass index is 22.79 kg/m².     I/O last 3 completed shifts:  In: 500 [IV Piggyback:500]  Out: -   No intake/output data recorded.    Physical Exam:  Resting in bed, in NAD  Sating well on RA  Regular rate, normal rhythm  HD catheter with cuff out on R side of chest  No surrounding erythema, edema or drainage  Abdomen soft, non-tender    Laboratory:  Recent Labs     06/28/23  1710 06/28/23  1716 06/29/23  0314   WBC 9.30  --  8.41   HGB 10.0*  --  9.3*   HCT 32.1*  --  28.5*     --  283   INR  --  1.11  --      Recent Labs     06/26/23  1230 06/28/23  1710 06/29/23  0314    144 143   K 3.9 3.8 4.1   CO2 17* 23 22   BUN 20.8* 20.9* 21.9*   CREATININE 1.67* 1.89* 1.39*   CALCIUM 8.9 9.3 8.8   MG  --  1.60  --    PHOS  --  3.3  --    ALBUMIN  --  3.9 3.0*   BILITOT  --  0.3 0.3   AST  --  31 23   ALKPHOS  --  59 53   ALT  --  42 32     Troponin:  No results for input(s): TROPONINI in the last 72 hours.  CBC:  Recent Labs     06/28/23  1710 06/29/23  0314   WBC 9.30 8.41   RBC 3.51* 3.18*   HGB 10.0* 9.3*   HCT 32.1* 28.5*    283   MCV 91.5 89.6   MCH 28.5 29.2   MCHC 31.2* 32.6*     CMP:  Recent Labs     06/28/23  1710 06/29/23  0314   CALCIUM 9.3 8.8   ALBUMIN 3.9 3.0*    143   K 3.8 4.1   CO2 23 22   BUN 20.9* 21.9*   CREATININE 1.89* 1.39*   ALKPHOS 59 53   ALT 42 32   AST 31 23   BILITOT 0.3 0.3     Lactic Acid:  No results for input(s): LACTATE in the last 72 hours.  Etoh:  No results for  input(s): ALCOHOLMEDIC in the last 72 hours.  Drug Screen:  No results for input(s): PCDSCOMETHA, COCAINEMETAB, OPIATESCREEN, BARBITURATES, AMPHETAMINES, MARIJUANATHC, PCDSOPHENCYN, CREATRANDUR, TOXINFO in the last 72 hours.    ABG:  No results for input(s): PH, PCO2, PO2, HCO3, BE, POCSATURATED in the last 72 hours.    Diagnostic Results:  X-Ray Chest PA And Lateral    Result Date: 6/28/2023  Right-sided central venous catheter tip over the SVC.  Patchy right perihilar opacity, improved since 06/12/2023. Electronically signed by: Arley Cuevas Date:    06/28/2023 Time:    18:00    X-Ray Chest PA And Lateral   Final Result      Right-sided central venous catheter tip over the SVC.  Patchy right perihilar opacity, improved since 06/12/2023.         Electronically signed by: Arley Cuevas   Date:    06/28/2023   Time:    18:00          Microbiology:  Microbiology Results (last 7 days)       Procedure Component Value Units Date/Time    Wound Culture [[id number]] Collected: 06/28/23 2012    Order Status: Sent Specimen: Drainage from Chest, Right Updated: 06/28/23 2025    Blood Culture #2 **CANNOT BE ORDERED STAT** [[id number]] Collected: 06/28/23 1710    Order Status: Resulted Specimen: Blood from Antecubital, Right Updated: 06/28/23 1714    Blood Culture #1 **CANNOT BE ORDERED STAT** [[id number]] Collected: 06/28/23 1710    Order Status: Resulted Specimen: Blood from Antecubital, Left Updated: 06/28/23 1714             ASSESSMENT & PLAN:   32M w/PMHx of polysubstance use who presented to the ER yesterday with partial dislodgement of his tunneled HD line.   - Awaiting nephrology recommendations regarding need for further HD   - Tunneled line removed  - Should patient need further dialysis will need to replace tunneled line tomorrow, 6/30 in cath lab    Whitley Rushing MD   [employer] General Surgery PGY 3

## 2023-06-29 NOTE — DISCHARGE SUMMARY
[facility] Internal Medicine Discharge Summary    Admitting Physician: Alexander Puente MD  Attending Physician: Alexander Puente MD  Date of Admit: 6/28/2023  Date of Discharge: 6/29/2023    Discharge to:  Home  Condition: Stable    Discharge Diagnoses     Patient Active Problem List   Diagnosis    Hepatitis C virus infection without hepatic coma    Other bipolar disorder    Opioid use disorder, moderate, in sustained remission    Amphetamine use disorder, severe, in sustained remission    KINGSLEY (acute kidney injury)    NSTEMI (non-ST elevated myocardial infarction)    Non-traumatic rhabdomyolysis    Shock    Solitary kidney, congenital    Hyperkalemia    Hypermagnesemia    Elevated levels of transaminase & lactic acid dehydrogenase    Seizure disorder    Septic shock    Acute renal failure    Metabolic acidosis    Community acquired pneumonia of right lung    Hypotension    Problem with dialysis access     Consultants and Procedures   Consultants:  Consults (From admission, onward)          Status Ordering Provider     Inpatient consult to Hospitalist  Once        Provider:  Katina Palma MD    Acknowledged CHAYITO FOSTER A     Inpatient consult to General surgery  Once        Provider:  Esme Harley MD    Completed CHAYITO FOSTER A           Brief History of Present Illness   Ryan Wild is a 32 y.o. male with a history of seizures, drug use, hepatitis C (treated), and solitary kidney who presented to [facility] ED on 6/28/2023  when his dialysis catheter had slid out while he was at work.  At that time, patient had been pushed the catheter back into place.  He then presented to the ED for further evaluation. Before presentation, patient reports he has not been receiving dialysis recently as his labs had been improving since his last admission. In the ED, patient reports mild tingling type pain around the site of the dialysis catheter.  On evaluation there was noted white drainage coming from dialysis catheter site.  Area around  the dialysis catheter site was also slightly erythematous. Patient denies any fevers, chills, chest pain, abdominal pain, dysuria, constipation or diarrhea at this time.   Hospital Course with Pertinent Findings   Patient was observed overnight.  Surgery was consulted in the morning and removed tunneled dialysis catheter.  Kidney function has recovered since previous discharge, no indication to place another dialysis line at this time.  Patient states he has no pain or additional bleeding around former line site, overall feeling well with no acute complaints.  Stable for discharge at this time    Discharge physical exam:  Vitals:    06/29/23 0922   BP: (!) 151/87   Pulse:    Resp:    Temp:      Physical Exam  Constitutional:       Appearance: Normal appearance.   Eyes:      Conjunctiva/sclera: Conjunctivae normal.      Pupils: Pupils are equal, round, and reactive to light.   Cardiovascular:      Rate and Rhythm: Normal rate and regular rhythm.      Pulses: Normal pulses.      Heart sounds: No murmur heard.  Pulmonary:      Effort: Pulmonary effort is normal. No respiratory distress.      Breath sounds: No wheezing.   Chest:      Chest wall: No tenderness.   Abdominal:      General: Abdomen is flat. Bowel sounds are normal. There is no distension.      Palpations: Abdomen is soft.      Tenderness: There is no abdominal tenderness.   Musculoskeletal:         General: No swelling. Normal range of motion.      Cervical back: Normal range of motion.   Skin:     General: Skin is warm.   Neurological:      General: No focal deficit present.      Mental Status: He is alert and oriented to person, place, and time.     TIME SPENT ON DISCHARGE: 60 minutes    Discharge Medications        Medication List        START taking these medications      gabapentin 300 MG capsule  Commonly known as: NEURONTIN  Take 1 capsule (300 mg total) by mouth 3 (three) times daily.     losartan 50 MG tablet  Commonly known as: COZAAR  Take 1  tablet (50 mg total) by mouth once daily.  Start taking on: June 30, 2023            CONTINUE taking these medications      albuterol 90 mcg/actuation inhaler  Commonly known as: PROVENTIL/VENTOLIN HFA     fluticasone propionate 50 mcg/actuation nasal spray  Commonly known as: FLONASE     levETIRAcetam 500 MG Tab  Commonly known as: KEPPRA     QUEtiapine 100 MG Tab  Commonly known as: SEROQUEL  Take 1 tablet (100 mg total) by mouth nightly.     valACYclovir 1000 MG tablet  Commonly known as: VALTREX     venlafaxine 37.5 MG 24 hr capsule  Commonly known as: EFFEXOR-XR               Where to Get Your Medications        These medications were sent to [id number] [address] & [address] [address]      Hours: 24-hours Phone: [phone]   gabapentin 300 MG capsule  losartan 50 MG tablet       Discharge Information:     -prescribed losartan 50 mg daily as well as gabapentin 300 mg t.i.d. to patient's pharmacy  -patient should continue taking all other medicines as previously prescribed   -kidney function recovered, no indication to continue hemodialysis  -continue to follow up with PCP as previously scheduled, should have labs repeated at that time to ensure kidneys remained stable  -return to ER for new or worsening symptoms    KEILA

## 2023-06-29 NOTE — PLAN OF CARE
06/29/23 1027   Discharge Assessment   Assessment Type Discharge Planning Assessment   Confirmed/corrected address, phone number and insurance Yes   Confirmed Demographics Correct on Facesheet   Source of Information patient   When was your last doctors appointment?   (Adilene Dillon)   Reason For Admission KINGSLEY, Problem with dialysis access, Non-traumatic rhabdomyolysis   People in Home friend(s)   Facility Arrived From: Home ([facility]   Do you expect to return to your current living situation? Yes   Do you have help at home or someone to help you manage your care at home? Yes   Who are your caregiver(s) and their phone number(s)? Aggie Keita Friend   [phone]; Jo Ann Kendall Mother   [phone]   Prior to hospitilization cognitive status: Alert/Oriented   Current cognitive status: Alert/Oriented   Equipment Currently Used at Home none   Readmission within 30 days? Yes   Patient currently being followed by outpatient case management? No   Do you currently have service(s) that help you manage your care at home? No   Do you take prescription medications? Yes  ([facility])   Do you have prescription coverage? Yes   Coverage [facility] M/D   Do you have any problems affording any of your prescribed medications? No   Is the patient taking medications as prescribed? yes   Who is going to help you get home at discharge? Self   How do you get to doctors appointments? car, drives self   Are you on dialysis? No  (Previously scheduled at [facility] 12:30-Dialysis catheter removed)   Discharge Plan A Home   DME Needed Upon Discharge  none   Discharge Plan discussed with: Patient   Transition of Care Barriers None   OTHER   Name(s) of People in Home [facility]     Pt single with no children; Employed full time in construction field as a ; Independent with ADL's; Anticipate d/c today with no needs.

## 2023-07-02 LAB — BACTERIA SPEC CULT: ABNORMAL

## 2023-07-03 LAB
BACTERIA BLD CULT: NORMAL
BACTERIA BLD CULT: NORMAL
LEFT BASC TRANS UA DIST: 0.31 CM
LEFT BASC TRANS UA MID: 0.51 CM
LEFT BASC TRANS UA PROX: 0.49 CM
LEFT BRACH ART DIA: 0.54 CM
LEFT BRACH VEIN 1 DIA: 0.36 CM
LEFT BRACH VEIN 2 DIA: 0.27 CM
LEFT CEPH TRANS ANT FOSSA: 0.21 CM
LEFT CEPH TRANS FA DIST: 0.09 CM
LEFT CEPH TRANS FA MID: 0.2 CM
LEFT CEPH TRANS FA PROX: 0.18 CM
LEFT CEPH TRANS UA DIST: 0.16 CM
LEFT CEPH TRANS UA MID: 0.18 CM
LEFT CEPH TRANS UA PROX: 0.22 CM
LEFT DIST RADIAL DIA: 0.2 CM
LEFT MID RADIAL DIA: 0.28 CM
LEFT PROX RADIAL DIA: 0 CM
RIGHT BASC TRANS UA DIST: 0.52 CM
RIGHT BASC TRANS UA MID: 0.49 CM
RIGHT BASC TRANS UA PROX: 0.51 CM
RIGHT BRACH ART DIA: 0.54 CM
RIGHT BRACH VEIN 1 DIA: 0.53 CM
RIGHT BRACH VEIN 2 DIA: 0.4 CM
RIGHT CEPH TRANS ANT FOSSA: 0.24 CM
RIGHT CEPH TRANS FA DIST: 0.08 CM
RIGHT CEPH TRANS FA MID: 0.1 CM
RIGHT CEPH TRANS FA PROX: 0.16 CM
RIGHT CEPH TRANS UA DIST: 0.16 CM
RIGHT CEPH TRANS UA MID: 0.12 CM
RIGHT CEPH TRANS UA PROX: 0.14 CM
RIGHT DIST RADIAL DIA: 0.25 CM
RIGHT MID RADIAL DIA: 0.23 CM
RIGHT PROX RADIAL DIA: 0.29 CM

## 2023-07-21 ENCOUNTER — DOCUMENTATION ONLY (OUTPATIENT)
Dept: NEPHROLOGY | Facility: CLINIC | Age: 33
End: 2023-07-21
Payer: MEDICAID

## 2023-07-21 NOTE — PROGRESS NOTES
Patient was no-show to [facility] on 07/19/2023.  Please do not reschedule unless referred again by PCP.  Thank you

## 2023-08-16 LAB
INR PPP: 1
INR PPP: 1.1
INR PPP: 1.4
PROTHROMBIN TIME: 13 SECONDS (ref 11.4–14)
PROTHROMBIN TIME: 14 SECONDS (ref 11.4–14)
PROTHROMBIN TIME: 16.3 SECONDS (ref 11.4–14)

## 2023-09-04 PROBLEM — I21.4 NSTEMI (NON-ST ELEVATED MYOCARDIAL INFARCTION): Status: RESOLVED | Noted: 2023-06-02 | Resolved: 2023-09-04

## 2023-09-18 PROBLEM — N17.9 ACUTE RENAL FAILURE: Status: RESOLVED | Noted: 2023-06-13 | Resolved: 2023-09-18

## 2023-09-18 PROBLEM — J18.9 COMMUNITY ACQUIRED PNEUMONIA OF RIGHT LUNG: Status: RESOLVED | Noted: 2023-06-15 | Resolved: 2023-09-18

## 2023-09-18 PROBLEM — R65.21 SEPTIC SHOCK: Status: RESOLVED | Noted: 2023-06-13 | Resolved: 2023-09-18

## 2023-09-18 PROBLEM — A41.9 SEPTIC SHOCK: Status: RESOLVED | Noted: 2023-06-13 | Resolved: 2023-09-18

## 2023-10-02 PROBLEM — N17.9 AKI (ACUTE KIDNEY INJURY): Status: RESOLVED | Noted: 2023-06-02 | Resolved: 2023-10-02

## 2023-11-27 ENCOUNTER — HOSPITAL ENCOUNTER (EMERGENCY)
Facility: HOSPITAL | Age: 33
Discharge: LEFT BEFORE DISCHARGE | End: 2023-11-27
Payer: MEDICAID

## 2023-11-27 VITALS
TEMPERATURE: 98 F | SYSTOLIC BLOOD PRESSURE: 169 MMHG | HEART RATE: 114 BPM | HEIGHT: 67 IN | WEIGHT: 160 LBS | OXYGEN SATURATION: 94 % | RESPIRATION RATE: 17 BRPM | BODY MASS INDEX: 25.11 KG/M2 | DIASTOLIC BLOOD PRESSURE: 93 MMHG

## 2023-11-27 DIAGNOSIS — R42 DIZZINESS: Primary | ICD-10-CM

## 2023-11-27 LAB
ALBUMIN SERPL-MCNC: 4.3 G/DL (ref 3.5–5)
ALBUMIN/GLOB SERPL: 1.3 RATIO (ref 1.1–2)
ALP SERPL-CCNC: 52 UNIT/L (ref 40–150)
ALT SERPL-CCNC: 65 UNIT/L (ref 0–55)
AST SERPL-CCNC: 65 UNIT/L (ref 5–34)
BASOPHILS # BLD AUTO: 0.1 X10(3)/MCL
BASOPHILS NFR BLD AUTO: 0.7 %
BILIRUB SERPL-MCNC: 0.3 MG/DL
BUN SERPL-MCNC: 24.6 MG/DL (ref 8.9–20.6)
CALCIUM SERPL-MCNC: 9 MG/DL (ref 8.4–10.2)
CHLORIDE SERPL-SCNC: 103 MMOL/L (ref 98–107)
CO2 SERPL-SCNC: 23 MMOL/L (ref 22–29)
CREAT SERPL-MCNC: 1.42 MG/DL (ref 0.73–1.18)
EOSINOPHIL # BLD AUTO: 0.15 X10(3)/MCL (ref 0–0.9)
EOSINOPHIL NFR BLD AUTO: 1.1 %
ERYTHROCYTE [DISTWIDTH] IN BLOOD BY AUTOMATED COUNT: 14.1 % (ref 11.5–17)
GFR SERPLBLD CREATININE-BSD FMLA CKD-EPI: >60 MLS/MIN/1.73/M2
GLOBULIN SER-MCNC: 3.3 GM/DL (ref 2.4–3.5)
GLUCOSE SERPL-MCNC: 94 MG/DL (ref 74–100)
HCT VFR BLD AUTO: 45.4 % (ref 42–52)
HGB BLD-MCNC: 14.6 G/DL (ref 14–18)
IMM GRANULOCYTES # BLD AUTO: 0.08 X10(3)/MCL (ref 0–0.04)
IMM GRANULOCYTES NFR BLD AUTO: 0.6 %
LYMPHOCYTES # BLD AUTO: 0.95 X10(3)/MCL (ref 0.6–4.6)
LYMPHOCYTES NFR BLD AUTO: 6.8 %
MCH RBC QN AUTO: 26.4 PG (ref 27–31)
MCHC RBC AUTO-ENTMCNC: 32.2 G/DL (ref 33–36)
MCV RBC AUTO: 81.9 FL (ref 80–94)
MONOCYTES # BLD AUTO: 1.33 X10(3)/MCL (ref 0.1–1.3)
MONOCYTES NFR BLD AUTO: 9.6 %
NEUTROPHILS # BLD AUTO: 11.26 X10(3)/MCL (ref 2.1–9.2)
NEUTROPHILS NFR BLD AUTO: 81.2 %
NRBC BLD AUTO-RTO: 0 %
PLATELET # BLD AUTO: 391 X10(3)/MCL (ref 130–400)
PMV BLD AUTO: 9 FL (ref 7.4–10.4)
POTASSIUM SERPL-SCNC: 4.1 MMOL/L (ref 3.5–5.1)
PROT SERPL-MCNC: 7.6 GM/DL (ref 6.4–8.3)
RBC # BLD AUTO: 5.54 X10(6)/MCL (ref 4.7–6.1)
SODIUM SERPL-SCNC: 137 MMOL/L (ref 136–145)
TROPONIN I SERPL-MCNC: 0.03 NG/ML (ref 0–0.04)
WBC # SPEC AUTO: 13.87 X10(3)/MCL (ref 4.5–11.5)

## 2023-11-27 PROCEDURE — 84484 ASSAY OF TROPONIN QUANT: CPT | Performed by: STUDENT IN AN ORGANIZED HEALTH CARE EDUCATION/TRAINING PROGRAM

## 2023-11-27 PROCEDURE — 80053 COMPREHEN METABOLIC PANEL: CPT | Performed by: STUDENT IN AN ORGANIZED HEALTH CARE EDUCATION/TRAINING PROGRAM

## 2023-11-27 PROCEDURE — 99900041 HC LEFT WITHOUT BEING SEEN- EMERGENCY

## 2023-11-27 PROCEDURE — 93010 ELECTROCARDIOGRAM REPORT: CPT | Mod: ,,, | Performed by: STUDENT IN AN ORGANIZED HEALTH CARE EDUCATION/TRAINING PROGRAM

## 2023-11-27 PROCEDURE — 85025 COMPLETE CBC W/AUTO DIFF WBC: CPT | Performed by: STUDENT IN AN ORGANIZED HEALTH CARE EDUCATION/TRAINING PROGRAM

## 2023-11-27 PROCEDURE — 93005 ELECTROCARDIOGRAM TRACING: CPT

## 2023-11-27 PROCEDURE — 93010 EKG 12-LEAD: ICD-10-PCS | Mod: ,,, | Performed by: STUDENT IN AN ORGANIZED HEALTH CARE EDUCATION/TRAINING PROGRAM

## 2023-11-28 NOTE — ED NOTES
Registration and security confirm patient stated he was fine and was leaving. An attempt was made to see if patient returned with no response

## 2024-03-12 ENCOUNTER — HOSPITAL ENCOUNTER (EMERGENCY)
Facility: HOSPITAL | Age: 34
Discharge: HOME | End: 2024-03-12
Attending: EMERGENCY MEDICINE
Payer: MEDICAID

## 2024-03-12 VITALS
RESPIRATION RATE: 20 BRPM | HEART RATE: 99 BPM | OXYGEN SATURATION: 97 % | HEIGHT: 67 IN | WEIGHT: 154.31 LBS | DIASTOLIC BLOOD PRESSURE: 83 MMHG | TEMPERATURE: 98 F | SYSTOLIC BLOOD PRESSURE: 150 MMHG | BODY MASS INDEX: 24.22 KG/M2

## 2024-03-12 DIAGNOSIS — F19.10 DRUG ABUSE: Primary | ICD-10-CM

## 2024-03-12 PROCEDURE — 99281 EMR DPT VST MAYX REQ PHY/QHP: CPT

## 2024-03-12 NOTE — FIRST PROVIDER EVALUATION
Medical screening examination initiated.  I have conducted a focused provider triage encounter, findings are as follows:    Brief history of present illness:  Pt is a 33 y.o. male who presents with law enforcement officers and an OPC.    There were no vitals filed for this visit.    Pertinent physical exam:      Brief workup plan:  Diagnostic evaluation    Preliminary workup initiated; this workup will be continued and followed by the physician or advanced practice provider that is assigned to the patient when roomed.

## 2024-03-12 NOTE — ED PROVIDER NOTES
Encounter Date: 3/12/2024       History     Chief Complaint   Patient presents with    Psychiatric Evaluation     opc     Brought in company of police under OPC for mental health evaluation.      Mental Health Problem  The primary symptoms include agitation. The primary symptoms do not include aggression, bizarre behavior, delusions, depressed mood, disorganized speech, disorganized thinking, dysphoric mood, hallucinations, homicidal ideas, negative symptoms, paranoia, self-injury, somatic symptoms, suicidal ideas, suicidal threats or suicide attempt. The current episode started several days ago. This is a recurrent problem.   The onset of the illness is precipitated by drug abuse. The degree of incapacity that he is experiencing as a consequence of his illness is moderate. Sequelae of the illness include harmed interpersonal relations. Additional symptoms of the illness include agitation, euphoric mood and poor judgment. Additional symptoms of the illness do not include anhedonia, insomnia, hypersomnia, appetite change, unexpected weight change, fatigue, psychomotor retardation, feelings of worthlessness, attention impairment, increased goal-directed activity, flight of ideas, inflated self-esteem, decreased need for sleep, distractible, visual change, headaches, abdominal pain or seizures. He does not admit to suicidal ideas. He does not contemplate harming himself. He does not contemplate injuring another person. Risk factors that are present for mental illness include a history of mental illness and substance abuse.     Review of patient's allergies indicates:  No Known Allergies  Past Medical History:   Diagnosis Date    Depression     Opioid abuse     Seizures     Solitary kidney, congenital     Unspecified viral hepatitis C without hepatic coma      Past Surgical History:   Procedure Laterality Date    INSERTION OF TUNNELED CENTRAL VENOUS HEMODIALYSIS CATHETER N/A 6/16/2023    Procedure: Insertion, Catheter,  Central Venous, Hemodialysis;  Surgeon: Eliazar Lopes III, MD;  Location: [facility] CATH LAB;  Service: Peripheral Vascular;  Laterality: N/A;    TONSILLECTOMY       Family History   Problem Relation Age of Onset    Cerebral aneurysm Father      Social History     Tobacco Use    Smoking status: Every Day     Types: Vaping with nicotine    Smokeless tobacco: Current   Substance Use Topics    Alcohol use: Not Currently    Drug use: Not Currently     Types: Heroin, Methamphetamines     Comment: 3 years sober     Review of Systems   Constitutional:  Negative for appetite change, fatigue and unexpected weight change.   Gastrointestinal:  Negative for abdominal pain.   Neurological:  Negative for seizures and headaches.   Psychiatric/Behavioral:  Positive for agitation. Negative for dysphoric mood, hallucinations, homicidal ideas, paranoia, self-injury and suicidal ideas. The patient does not have insomnia.    All other systems reviewed and are negative.      Physical Exam     Initial Vitals [03/12/24 1201]   BP Pulse Resp Temp SpO2   (!) 150/83 99 20 97.7 °F (36.5 °C) 97 %      MAP       --         Physical Exam    Nursing note and vitals reviewed.  Constitutional: He appears well-developed and well-nourished. He is not diaphoretic. No distress.   HENT:   Head: Normocephalic and atraumatic.   Eyes: EOM are normal. Pupils are equal, round, and reactive to light. Right eye exhibits no discharge. Left eye exhibits no discharge.   Neck: Neck supple. No thyromegaly present. No tracheal deviation present. No JVD present.   Normal range of motion.  Cardiovascular:  Normal rate, regular rhythm, normal heart sounds and intact distal pulses.           No murmur heard.  Pulmonary/Chest: Breath sounds normal. No stridor. No respiratory distress. He has no wheezes. He has no rhonchi. He has no rales.   Abdominal: Abdomen is soft. He exhibits no distension. There is no abdominal tenderness. There is no rebound and no guarding.    Musculoskeletal:         General: No tenderness or edema. Normal range of motion.      Cervical back: Normal range of motion and neck supple.     Neurological: He is alert and oriented to person, place, and time. He has normal strength. No cranial nerve deficit. GCS score is 15. GCS eye subscore is 4. GCS verbal subscore is 5. GCS motor subscore is 6.   Skin: Skin is warm and dry. Capillary refill takes less than 2 seconds. No rash and no abscess noted. No erythema. No pallor.   Psychiatric: He has a normal mood and affect. His behavior is normal. Judgment and thought content normal.         ED Course   Procedures  Labs Reviewed - No data to display       Imaging Results    None          Medications - No data to display  Medical Decision Making  Patient brought for mental health evaluation in light of recurrence of substance abuse disorder;    Amount and/or Complexity of Data Reviewed  External Data Reviewed: notes.     Details: Substantiate the history as endorsed by patient;    Risk  Decision regarding hospitalization.  Risk Details: Patient is here at the behest of parents requesting formal mental health evaluation.  Patient has no interest in formal mental health evaluation at this time.  Patient is logical, coherent, speech is goal-directed.  We find no features suggestive of an imminent risk to health of patient or any identified others.  He is discharged in stable condition with anticipatory guidance, return precautions, follow-up instructions.                                      Clinical Impression:  Final diagnoses:  [F19.10] Drug abuse (Primary)          ED Disposition Condition    Discharge Stable          ED Prescriptions    None       Follow-up Information       Follow up With Specialties Details Why Contact Info    [facility] Emergency Medicine  As needed, If symptoms worsen [address] [facility] [address] [phone]    [facility]  Call   [address]  [facility] [address]  [phone]             Theodbrandan, David ANTHONY MD  03/12/24 1227

## 2024-03-22 ENCOUNTER — HOSPITAL ENCOUNTER (EMERGENCY)
Facility: HOSPITAL | Age: 34
Discharge: NURSING, REHAB OR LONG-TERM CARE | End: 2024-03-23
Attending: INTERNAL MEDICINE
Payer: MEDICAID

## 2024-03-22 ENCOUNTER — HOSPITAL ENCOUNTER (EMERGENCY)
Facility: HOSPITAL | Age: 34
Discharge: HOME | End: 2024-03-22
Attending: INTERNAL MEDICINE
Payer: MEDICAID

## 2024-03-22 VITALS
TEMPERATURE: 97 F | HEART RATE: 107 BPM | OXYGEN SATURATION: 97 % | RESPIRATION RATE: 20 BRPM | DIASTOLIC BLOOD PRESSURE: 90 MMHG | SYSTOLIC BLOOD PRESSURE: 154 MMHG | WEIGHT: 175 LBS | BODY MASS INDEX: 27.47 KG/M2 | HEIGHT: 67 IN

## 2024-03-22 VITALS
HEART RATE: 92 BPM | TEMPERATURE: 98 F | BODY MASS INDEX: 27.47 KG/M2 | SYSTOLIC BLOOD PRESSURE: 147 MMHG | DIASTOLIC BLOOD PRESSURE: 93 MMHG | WEIGHT: 175 LBS | OXYGEN SATURATION: 97 % | HEIGHT: 67 IN | RESPIRATION RATE: 20 BRPM

## 2024-03-22 DIAGNOSIS — F19.10 POLYSUBSTANCE ABUSE: Primary | ICD-10-CM

## 2024-03-22 LAB — ETHANOL SERPL-MCNC: <10 MG/DL

## 2024-03-22 PROCEDURE — 82077 ASSAY SPEC XCP UR&BREATH IA: CPT | Performed by: INTERNAL MEDICINE

## 2024-03-22 PROCEDURE — 94761 N-INVAS EAR/PLS OXIMETRY MLT: CPT

## 2024-03-22 PROCEDURE — 99283 EMERGENCY DEPT VISIT LOW MDM: CPT

## 2024-03-22 PROCEDURE — 99283 EMERGENCY DEPT VISIT LOW MDM: CPT | Mod: 27
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
## 2024-03-23 LAB
AMPHET UR QL SCN: POSITIVE
BARBITURATE SCN PRESENT UR: NEGATIVE
BENZODIAZ UR QL SCN: NEGATIVE
CANNABINOIDS UR QL SCN: NEGATIVE
COCAINE UR QL SCN: POSITIVE
FENTANYL UR QL SCN: POSITIVE
MDMA UR QL SCN: NEGATIVE
OPIATES UR QL SCN: POSITIVE
PCP UR QL: NEGATIVE
PH UR: 6.5 [PH] (ref 3–11)
SPECIFIC GRAVITY, URINE AUTO (.000) (OHS): 1.03 (ref 1–1.03)

## 2024-03-23 PROCEDURE — 80307 DRUG TEST PRSMV CHEM ANLYZR: CPT | Performed by: INTERNAL MEDICINE

## 2024-03-23 RX ORDER — NALOXONE HYDROCHLORIDE 4 MG/.1ML
1 SPRAY NASAL ONCE AS NEEDED
Qty: 1 EACH | Refills: 0 | Status: SHIPPED | OUTPATIENT
Start: 2024-03-23

## 2024-03-23 NOTE — ED NOTES
Pt returns to room. Provided with urinal for uds collection. Pt accepted at [facility] in [place] pending uds collection.

## 2024-03-23 NOTE — ED NOTES
Pt walks out of hospital to parking lot to get clothing from his truck with [place] representative.

## 2024-03-23 NOTE — DISCHARGE INSTRUCTIONS
I provided the patient with counseling regarding opioid abuse complications, consequences and alternatives of treatment. We spoke about rehabilitation programs and a list of available alternatives was provided. I provide the patient with a prescription for naloxone and instructions of use were given. I encourage the patient to teach his friends and family members in naloxone emergency use.  The patient understood his condition and the importance of obtaining adequate addiction counseling and rehabilitation help.

## 2024-03-23 NOTE — ED PROVIDER NOTES
"Encounter Date: 3/22/2024       History     Chief Complaint   Patient presents with    Drug Overdose     Found AMS, EMS gave Narcan 2mg. Pt awake and alert on Ed arrival, denies SI/HI.      Presents by EMS after an apparent opioid accidental overdose. Pt was unresponsive at "[facility]" bathroom. Narcan 2 mg IN given with response. Pt asking to call [place] for rehabilitation, admits using multiple drugs.    The history is provided by the patient and the EMS personnel.     Review of patient's allergies indicates:  No Known Allergies  Past Medical History:   Diagnosis Date    Depression     Opioid abuse     Seizures     Solitary kidney, congenital     Unspecified viral hepatitis C without hepatic coma      Past Surgical History:   Procedure Laterality Date    INSERTION OF TUNNELED CENTRAL VENOUS HEMODIALYSIS CATHETER N/A 6/16/2023    Procedure: Insertion, Catheter, Central Venous, Hemodialysis;  Surgeon: Eliazar Lopes III, MD;  Location: [facility] CATH LAB;  Service: Peripheral Vascular;  Laterality: N/A;    TONSILLECTOMY       Family History   Problem Relation Age of Onset    Cerebral aneurysm Father      Social History     Tobacco Use    Smoking status: Every Day     Types: Vaping with nicotine    Smokeless tobacco: Current   Substance Use Topics    Alcohol use: Not Currently    Drug use: Not Currently     Types: Heroin, Methamphetamines     Comment: 3 years sober     Review of Systems   Constitutional:  Negative for fever.   HENT:  Negative for sore throat.    Respiratory:  Negative for shortness of breath.    Cardiovascular:  Negative for chest pain.   Gastrointestinal:  Negative for nausea.   Genitourinary:  Negative for dysuria.   Musculoskeletal:  Negative for back pain.   Skin:  Negative for rash.   Neurological:  Positive for syncope. Negative for weakness.   Hematological:  Does not bruise/bleed easily.   All other systems reviewed and are negative.      Physical Exam     Initial Vitals [03/22/24 2209] "   BP Pulse Resp Temp SpO2   (!) 160/100 72 20 97.2 °F (36.2 °C) 99 %      MAP       --         Physical Exam    Nursing note and vitals reviewed.  Constitutional: He appears well-developed and well-nourished. No distress.   HENT:   Head: Normocephalic and atraumatic.   Nose: Nose normal.   Mouth/Throat: Oropharynx is clear and moist.   Eyes: Conjunctivae and EOM are normal. Pupils are equal, round, and reactive to light.   Neck: Neck supple. No tracheal deviation present. No JVD present.   Normal range of motion.  Cardiovascular:  Regular rhythm, normal heart sounds and intact distal pulses.           Pulmonary/Chest: Breath sounds normal. No respiratory distress.   Abdominal: Abdomen is soft. Bowel sounds are normal. He exhibits no distension. There is no abdominal tenderness. There is no rebound and no guarding.   Musculoskeletal:         General: No edema. Normal range of motion.      Cervical back: Normal range of motion and neck supple.     Neurological: He is alert and oriented to person, place, and time. He has normal strength. GCS score is 15. GCS eye subscore is 4. GCS verbal subscore is 5. GCS motor subscore is 6.   Skin: Skin is warm and dry. No rash noted.   Multiple facial pustular lesions   Psychiatric: His behavior is normal. Thought content normal.         ED Course   Procedures  Labs Reviewed   DRUG SCREEN, URINE (BEAKER) - Abnormal; Notable for the following components:       Result Value    Amphetamines, Urine Positive (*)     Cocaine, Urine Positive (*)     Fentanyl, Urine Positive (*)     Opiates, Urine Positive (*)     All other components within normal limits    Narrative:     Cut off concentrations:    Amphetamines - 1000 ng/ml  Barbiturates - 200 ng/ml  Benzodiazepine - 200 ng/ml  Cannabinoids (THC) - 50 ng/ml  Cocaine - 300 ng/ml  Fentanyl - 1.0 ng/ml  MDMA - 500 ng/ml  Opiates - 300 ng/ml   Phencyclidine (PCP) - 25 ng/ml    Specimen submitted for drug analysis and tested for pH and  specific gravity in order to evaluate sample integrity. Suspect tampering if specific gravity is <1.003 and/or pH is not within the range of 4.5 - 8.0  False negatives may result form substances such as bleach added to urine.  False positives may result for the presence of a substance with similar chemical structure to the drug or its metabolite.    This test provides only a PRELIMINARY analytical test result. A more specific alternate chemical method must be used in order to obtain a confirmed analytical result. Gas chromatography/mass spectrometry (GC/MS) is the preferred confirmatory method. Other chemical confirmation methods are available. Clinical consideration and professional judgement should be applied to any drug of abuse test result, particularly when preliminary positive results are used.    Positive results will be confirmed only at the physicians request. Unconfirmed screening results are to be used only for medical purposes (treatment).        ALCOHOL,MEDICAL (ETHANOL) - Normal          Imaging Results    None          Medications - No data to display  Medical Decision Making  Amount and/or Complexity of Data Reviewed  Labs: ordered. Decision-making details documented in ED Course.  Discussion of management or test interpretation with external provider(s): 10:17 PM Consult: I discussed the case with [facility] Counselor on call . Agrees with current management.   Recommends will evaluate in ED    12:50 AM    Pt accepted to [facility] at [place]        Additional MDM:   Differential Diagnosis:   Drug overdose, hypoglycemia, stroke, encephalopathy, medication side effects among others                              12:52 AM    .primo Francois provided the patient with counseling regarding opioid abuse complications, consequences and alternatives of treatment. We spoke about rehabilitation programs and a list of available alternatives was provided. I provide the patient with a prescription for naloxone and instructions  of use were given. I encourage the patient to teach his friends and family members in naloxone emergency use.  The patient understood his condition and the importance of obtaining adequate addiction counseling and rehabilitation help.        Clinical Impression:  Final diagnoses:  [F19.10] Polysubstance abuse (Primary)          ED Disposition Condition    Discharge Stable          ED Prescriptions       Medication Sig Dispense Start Date End Date Auth. Provider    naloxone (NARCAN) 4 mg/actuation Spry 1 spray (4 mg total) by Nasal route 1 (one) time if needed (Opioid overdose). 1 each 3/23/2024 -- Juan Manuel Sutton MD          Follow-up Information       Follow up With Specialties Details Why Contact Info    Adilene Dillon NP Family Medicine In 2 weeks  [address] [facility] [facility] [address] [phone]      [facility] Emergency Medicine  If symptoms worsen [address] [facility] [address] [phone]    [facility] Today [address] [place]  [phone]             Juan Manuel Sutton MD  03/23/24 0766

## 2024-05-05 ENCOUNTER — HOSPITAL ENCOUNTER (EMERGENCY)
Facility: HOSPITAL | Age: 34
Discharge: HOME | End: 2024-05-05
Attending: EMERGENCY MEDICINE
Payer: MEDICAID

## 2024-05-05 VITALS
BODY MASS INDEX: 20.4 KG/M2 | TEMPERATURE: 99 F | DIASTOLIC BLOOD PRESSURE: 90 MMHG | WEIGHT: 130 LBS | HEART RATE: 104 BPM | HEIGHT: 67 IN | OXYGEN SATURATION: 98 % | SYSTOLIC BLOOD PRESSURE: 151 MMHG | RESPIRATION RATE: 18 BRPM

## 2024-05-05 DIAGNOSIS — B00.1 FEVER BLISTER: ICD-10-CM

## 2024-05-05 DIAGNOSIS — T43.655S: ICD-10-CM

## 2024-05-05 DIAGNOSIS — Z51.89 VISIT FOR WOUND CHECK: Primary | ICD-10-CM

## 2024-05-05 PROCEDURE — 99284 EMERGENCY DEPT VISIT MOD MDM: CPT

## 2024-05-05 PROCEDURE — 25000003 PHARM REV CODE 250: Performed by: EMERGENCY MEDICINE

## 2024-05-05 RX ORDER — VALACYCLOVIR HYDROCHLORIDE 500 MG/1
1000 TABLET, FILM COATED ORAL 2 TIMES DAILY
Qty: 28 TABLET | Refills: 0 | Status: SHIPPED | OUTPATIENT
Start: 2024-05-05 | End: 2024-05-12

## 2024-05-05 RX ORDER — BACITRACIN 500 [USP'U]/G
OINTMENT TOPICAL
Status: COMPLETED | OUTPATIENT
Start: 2024-05-05 | End: 2024-05-05

## 2024-05-05 RX ORDER — BACITRACIN ZINC 500 UNIT/G
OINTMENT (GRAM) TOPICAL 2 TIMES DAILY
Qty: 30 G | Refills: 0 | Status: SHIPPED | OUTPATIENT
Start: 2024-05-05

## 2024-05-05 RX ADMIN — BACITRACIN: 500 OINTMENT TOPICAL at 06:05

## 2024-05-05 NOTE — ED PROVIDER NOTES
Encounter Date: 5/5/2024       History     Chief Complaint   Patient presents with    Wound Check     Pt. C/o multiple wounds to face x 3 days. Denies fever. Pt. States he used meth 3 days ago and then wounds appeared.       Patient presents with some wounds of the face for the past 4 days history of methamphetamine use.  Of note patient initially presented to the emergency room asking to use a phone  when he was told he had to leave he checked in.  Patient states he is going through some things mentally but is nonsuicidal non homicidal patient states recent meth use no nausea no vomiting diarrhea.  Patient also states he has a fever blister in his history of the    The history is provided by the patient.     Review of patient's allergies indicates:  No Known Allergies  Past Medical History:   Diagnosis Date    Depression     Opioid abuse     Seizures     Solitary kidney, congenital     Unspecified viral hepatitis C without hepatic coma      Past Surgical History:   Procedure Laterality Date    INSERTION OF TUNNELED CENTRAL VENOUS HEMODIALYSIS CATHETER N/A 6/16/2023    Procedure: Insertion, Catheter, Central Venous, Hemodialysis;  Surgeon: Eliazar Lopes III, MD;  Location: [facility] CATH LAB;  Service: Peripheral Vascular;  Laterality: N/A;    TONSILLECTOMY       Family History   Problem Relation Name Age of Onset    Cerebral aneurysm Father       Social History     Tobacco Use    Smoking status: Every Day     Types: Vaping with nicotine    Smokeless tobacco: Current   Substance Use Topics    Alcohol use: Not Currently    Drug use: Not Currently     Types: Heroin, Methamphetamines     Comment: 3 years sober     Review of Systems   Constitutional:  Negative for fever.   HENT:  Negative for sore throat.    Respiratory:  Negative for shortness of breath.    Cardiovascular:  Negative for chest pain.   Gastrointestinal:  Negative for nausea.   Genitourinary:  Negative for dysuria.   Musculoskeletal:  Negative for back  pain.   Skin:  Positive for rash.   Neurological:  Negative for weakness.   Hematological:  Does not bruise/bleed easily.       Physical Exam     Initial Vitals [05/05/24 0420]   BP Pulse Resp Temp SpO2   (!) 151/90 104 18 98.6 °F (37 °C) 98 %      MAP       --         Physical Exam    Nursing note and vitals reviewed.  Constitutional: He appears well-developed and well-nourished.   HENT:   Head: Normocephalic and atraumatic.   Eyes: EOM are normal. Pupils are equal, round, and reactive to light.   Neck:   Normal range of motion.  Cardiovascular:  Normal rate, regular rhythm, normal heart sounds and intact distal pulses.           Pulmonary/Chest: Breath sounds normal.   Abdominal: Abdomen is soft. Bowel sounds are normal.   Musculoskeletal:         General: Normal range of motion.      Cervical back: Normal range of motion.     Neurological: He is alert and oriented to person, place, and time. He has normal strength. GCS score is 15. GCS eye subscore is 4. GCS verbal subscore is 5. GCS motor subscore is 6.   Skin: Skin is warm and dry. Capillary refill takes less than 2 seconds.   On the face there are scattered small pustules, there is small group of vesicles in the right lower lip   Psychiatric: He has a normal mood and affect. His behavior is normal. Judgment and thought content normal.         ED Course   Procedures  Labs Reviewed - No data to display       Imaging Results    None          Medications   bacitracin ointment ( Topical (Top) Given 5/5/24 0608)     Medical Decision Making  Topical wound care patient will be discharged    Problems Addressed:  Adverse effect of methamphetamines, sequela: chronic illness or injury  Fever blister: acute illness or injury  Visit for wound check: acute illness or injury    Risk  OTC drugs.  Prescription drug management.                                      Clinical Impression:  Final diagnoses:  [Z51.89] Visit for wound check (Primary)  [T43.655S] Adverse effect of  methamphetamines, sequela  [B00.1] Fever blister          ED Disposition Condition    Discharge Stable          ED Prescriptions       Medication Sig Dispense Start Date End Date Auth. Provider    bacitracin 500 unit/gram Oint Apply topically 2 (two) times daily. 30 g 5/5/2024 -- Norman Garcia III, MD    valACYclovir (VALTREX) 500 MG tablet Take 2 tablets (1,000 mg total) by mouth 2 (two) times daily. for 7 days 28 tablet 5/5/2024 5/12/2024 Norman Garcia III, MD          Follow-up Information    None          Norman Garcia III, MD  05/05/24 4818

## 2024-05-09 ENCOUNTER — HOSPITAL ENCOUNTER (EMERGENCY)
Facility: HOSPITAL | Age: 34
Discharge: HOME | End: 2024-05-09
Attending: EMERGENCY MEDICINE
Payer: MEDICAID

## 2024-05-09 VITALS
HEIGHT: 67 IN | RESPIRATION RATE: 18 BRPM | DIASTOLIC BLOOD PRESSURE: 68 MMHG | OXYGEN SATURATION: 98 % | SYSTOLIC BLOOD PRESSURE: 121 MMHG | BODY MASS INDEX: 20.4 KG/M2 | TEMPERATURE: 98 F | HEART RATE: 91 BPM | WEIGHT: 130 LBS

## 2024-05-09 DIAGNOSIS — L03.113 CELLULITIS OF RIGHT HAND: Primary | ICD-10-CM

## 2024-05-09 LAB
ALBUMIN SERPL-MCNC: 3.3 G/DL (ref 3.5–5)
ALBUMIN/GLOB SERPL: 0.9 RATIO (ref 1.1–2)
ALP SERPL-CCNC: 59 UNIT/L (ref 40–150)
ALT SERPL-CCNC: 26 UNIT/L (ref 0–55)
AST SERPL-CCNC: 36 UNIT/L (ref 5–34)
BASOPHILS # BLD AUTO: 0.03 X10(3)/MCL
BASOPHILS NFR BLD AUTO: 0.3 %
BILIRUB SERPL-MCNC: 0.4 MG/DL
BUN SERPL-MCNC: 13.1 MG/DL (ref 8.9–20.6)
CALCIUM SERPL-MCNC: 9.2 MG/DL (ref 8.4–10.2)
CHLORIDE SERPL-SCNC: 102 MMOL/L (ref 98–107)
CO2 SERPL-SCNC: 28 MMOL/L (ref 22–29)
CREAT SERPL-MCNC: 0.82 MG/DL (ref 0.73–1.18)
CRP SERPL-MCNC: 108.2 MG/L
EOSINOPHIL # BLD AUTO: 0.1 X10(3)/MCL (ref 0–0.9)
EOSINOPHIL NFR BLD AUTO: 1.1 %
ERYTHROCYTE [DISTWIDTH] IN BLOOD BY AUTOMATED COUNT: 12.1 % (ref 11.5–17)
ERYTHROCYTE [SEDIMENTATION RATE] IN BLOOD: 17 MM/HR (ref 0–15)
GFR SERPLBLD CREATININE-BSD FMLA CKD-EPI: >60 ML/MIN/1.73/M2
GLOBULIN SER-MCNC: 3.6 GM/DL (ref 2.4–3.5)
GLUCOSE SERPL-MCNC: 92 MG/DL (ref 74–100)
HCT VFR BLD AUTO: 33 % (ref 42–52)
HGB BLD-MCNC: 11.5 G/DL (ref 14–18)
HOLD SPECIMEN: NORMAL
IMM GRANULOCYTES # BLD AUTO: 0.04 X10(3)/MCL (ref 0–0.04)
IMM GRANULOCYTES NFR BLD AUTO: 0.4 %
LYMPHOCYTES # BLD AUTO: 0.85 X10(3)/MCL (ref 0.6–4.6)
LYMPHOCYTES NFR BLD AUTO: 9.2 %
MCH RBC QN AUTO: 29.8 PG (ref 27–31)
MCHC RBC AUTO-ENTMCNC: 34.8 G/DL (ref 33–36)
MCV RBC AUTO: 85.5 FL (ref 80–94)
MONOCYTES # BLD AUTO: 1.26 X10(3)/MCL (ref 0.1–1.3)
MONOCYTES NFR BLD AUTO: 13.7 %
NEUTROPHILS # BLD AUTO: 6.93 X10(3)/MCL (ref 2.1–9.2)
NEUTROPHILS NFR BLD AUTO: 75.3 %
NRBC BLD AUTO-RTO: 0 %
PLATELET # BLD AUTO: 295 X10(3)/MCL (ref 130–400)
PMV BLD AUTO: 8.9 FL (ref 7.4–10.4)
POTASSIUM SERPL-SCNC: 4.5 MMOL/L (ref 3.5–5.1)
PROT SERPL-MCNC: 6.9 GM/DL (ref 6.4–8.3)
RBC # BLD AUTO: 3.86 X10(6)/MCL (ref 4.7–6.1)
SODIUM SERPL-SCNC: 139 MMOL/L (ref 136–145)
WBC # SPEC AUTO: 9.21 X10(3)/MCL (ref 4.5–11.5)

## 2024-05-09 PROCEDURE — 99284 EMERGENCY DEPT VISIT MOD MDM: CPT | Mod: 25

## 2024-05-09 PROCEDURE — 80053 COMPREHEN METABOLIC PANEL: CPT | Performed by: PHYSICIAN ASSISTANT

## 2024-05-09 PROCEDURE — 85025 COMPLETE CBC W/AUTO DIFF WBC: CPT | Performed by: PHYSICIAN ASSISTANT

## 2024-05-09 PROCEDURE — 85652 RBC SED RATE AUTOMATED: CPT | Performed by: PHYSICIAN ASSISTANT

## 2024-05-09 PROCEDURE — 86140 C-REACTIVE PROTEIN: CPT | Performed by: PHYSICIAN ASSISTANT

## 2024-05-09 RX ORDER — SULFAMETHOXAZOLE AND TRIMETHOPRIM 800; 160 MG/1; MG/1
1 TABLET ORAL 2 TIMES DAILY
Qty: 14 TABLET | Refills: 0 | Status: SHIPPED | OUTPATIENT
Start: 2024-05-09 | End: 2024-05-16

## 2024-05-09 NOTE — ED PROVIDER NOTES
Encounter Date: 5/9/2024       History     Chief Complaint   Patient presents with    Hand Pain     Right hand pain with swelling (x)2 days. Patient denies trauma and rates pain 6/10 at this time. Vss. nadn     Patient with pmhx of IVDU (heroin and meth), seizure disorder, solitary kidney, and depression presents today c/o right hand pain and swelling for 2 days. He says pain started in forearm. Pain is worse with movement. No relieving factors. He is right handed. He has been accepted at [facility], but needs to have hand checked out before he can go. Reports using IV heroin today in left arm. Denies fever injecting in hand, but has injected in right forearm. Denies fever, chills.     The history is provided by the patient. No  was used.     Review of patient's allergies indicates:  No Known Allergies  Past Medical History:   Diagnosis Date    Depression     Opioid abuse     Seizures     Solitary kidney, congenital     Unspecified viral hepatitis C without hepatic coma      Past Surgical History:   Procedure Laterality Date    INSERTION OF TUNNELED CENTRAL VENOUS HEMODIALYSIS CATHETER N/A 6/16/2023    Procedure: Insertion, Catheter, Central Venous, Hemodialysis;  Surgeon: Eliazar Lopes III, MD;  Location: [facility] CATH LAB;  Service: Peripheral Vascular;  Laterality: N/A;    TONSILLECTOMY       Family History   Problem Relation Name Age of Onset    Cerebral aneurysm Father       Social History     Tobacco Use    Smoking status: Every Day     Types: Vaping with nicotine    Smokeless tobacco: Current   Substance Use Topics    Alcohol use: Not Currently    Drug use: Not Currently     Types: Heroin, Methamphetamines     Comment: 3 years sober     Review of Systems   Constitutional: Negative.    Respiratory: Negative.     Cardiovascular: Negative.    Gastrointestinal: Negative.    Musculoskeletal:         Hand pain   Skin:  Positive for wound.   Neurological: Negative.    Psychiatric/Behavioral:           Polysubstance abuse    All other systems reviewed and are negative.      Physical Exam     Initial Vitals [05/09/24 1311]   BP Pulse Resp Temp SpO2   121/68 91 18 98.1 °F (36.7 °C) 98 %      MAP       --         Physical Exam    Nursing note and vitals reviewed.  Constitutional: He is not diaphoretic. No distress.   HENT:   Head: Normocephalic and atraumatic.   Mouth/Throat: Oropharynx is clear and moist. No oropharyngeal exudate.   Eyes: Conjunctivae and EOM are normal.   Neck: Neck supple.   Cardiovascular:  Normal rate, regular rhythm, normal heart sounds and intact distal pulses.           Pulmonary/Chest: Breath sounds normal. No respiratory distress. He has no wheezes. He has no rhonchi. He has no rales.   Abdominal: Abdomen is soft. He exhibits no distension. There is no abdominal tenderness.   Musculoskeletal:      Right hand: Swelling and tenderness present. No deformity, lacerations or bony tenderness. Normal range of motion. Normal strength. Normal sensation. There is no disruption of two-point discrimination. Normal capillary refill. Normal pulse.      Left hand: Normal.        Arms:       Cervical back: Neck supple.      Comments: Localized swelling and erythema on the dorsal aspect of right hand overlying the 4-5th metacarpals. No fluctuance. No drainage. Tender to palpation. Patient experiences  pain with making fist, but has full AROM of all joints in right hand. NVI, 2+ radial pulses.      Neurological: He is alert and oriented to person, place, and time. GCS score is 15. GCS eye subscore is 4. GCS verbal subscore is 5. GCS motor subscore is 6.   Skin: Skin is warm and dry. Capillary refill takes less than 2 seconds. No rash noted.   Multiple scab wounds noted to face. Patient reports did meth several days ago and picked at his face obsessively while he was intoxicated.    Psychiatric: He has a normal mood and affect.       ED Course   Procedures  Labs Reviewed   COMPREHENSIVE METABOLIC  PANEL - Abnormal; Notable for the following components:       Result Value    Albumin 3.3 (*)     Globulin 3.6 (*)     Albumin/Globulin Ratio 0.9 (*)     AST 36 (*)     All other components within normal limits   SEDIMENTATION RATE, AUTOMATED - Abnormal; Notable for the following components:    Sed Rate 17 (*)     All other components within normal limits   C-REACTIVE PROTEIN - Abnormal; Notable for the following components:    .20 (*)     All other components within normal limits   CBC WITH DIFFERENTIAL - Abnormal; Notable for the following components:    RBC 3.86 (*)     Hgb 11.5 (*)     Hct 33.0 (*)     All other components within normal limits   CBC W/ AUTO DIFFERENTIAL    Narrative:     The following orders were created for panel order CBC auto differential.  Procedure                               Abnormality         Status                     ---------                               -----------         ------                     CBC with Differential[[id number]]       Abnormal            Final result                 Please view results for these tests on the individual orders.   EXTRA TUBES    Narrative:     The following orders were created for panel order EXTRA TUBES.  Procedure                               Abnormality         Status                     ---------                               -----------         ------                     Red Top Hold[[id number]]                                    In process                   Please view results for these tests on the individual orders.   RED TOP HOLD          Imaging Results              X-Ray Hand 3 view Right (Final result)  Result time 05/09/24 15:06:40      Final result by Emmanuel Waldron MD (05/09/24 15:06:40)                   Impression:      No acute osseous abnormality.      Electronically signed by: Emmanuel Waldron  Date:    05/09/2024  Time:    15:06               Narrative:    EXAMINATION:  XR HAND COMPLETE 3 VIEW RIGHT    CLINICAL  HISTORY:  hand pain;    COMPARISON:  None.    FINDINGS:  No acute displaced fractures or dislocations.    Joint spaces preserved.    No blastic or lytic lesions.    Soft tissues within normal limits.                                       Medications - No data to display  Medical Decision Making  Ddx: cellulitis, abscess, tenosynovitis, osteomyelitis, amongst others    Patient is non-toxic appearing. Vitals stable. Labs and xray reviewed. Elevated inflammatory markers. Normal renal function. He does not meet sirs/sepsis criteria. Recommend 7 day course of bactrim. Advised to f/u with pcp in 1-2 days for re-evaluation. If he is unable to get an appt with his pcp he understands he should return to the ED. He is stable for discharge. ED precautions given.     Amount and/or Complexity of Data Reviewed  External Data Reviewed: labs and notes.  Labs: ordered. Decision-making details documented in ED Course.  Radiology: ordered. Decision-making details documented in ED Course.    Risk  Risk Details: Given strict ED return precautions. I have spoken with the patient and/or caregivers. I have explained the patient's condition, diagnoses and treatment plan based on the information available to me at this time. I have answered the patient's and/or caregiver's questions and addressed any concerns. The patient and/or caregivers have as good an understanding of the patient's diagnosis, condition and treatment plan as can be expected at this point. The vital signs have been stable. The patient's condition is stable and appropriate for discharge from the emergency department.      The patient will pursue further outpatient evaluation with the primary care physician or other designated or consulting physician as outlined in the discharge instructions. The patient and/or caregivers are agreeable to this plan of care and follow-up instructions have been explained in detail. The patient and/or caregivers have received these instructions  in written format and have expressed an understanding of the discharge instructions. The patient and/or caregivers are aware that any significant change in condition or worsening of symptoms should prompt an immediate return to this or the closest emergency department or a call to [facility].               ED Course as of 05/09/24 1622   Thu May 09, 2024   1510 X-Ray Hand 3 view Right [SA]   1539 WBC: 9.21 [SA]   1539 Hemoglobin(!): 11.5 [SA]   1539 Hematocrit(!): 33.0 [SA]   1539 Platelet Count: 295 [SA]   1539 Sed Rate(!): 17 [SA]   1546 BUN: 13.1 [SA]   1546 Creatinine: 0.82 [SA]   1546 CRP(!): 108.20 [SA]   1621 Patient on the phone with his contact from [facility] at time of discharge. They are still sending an advocate down here even though he is accepted there and will be transported to rehab via [facility].  [SA]      ED Course User Index  [SA] Jerri Bertrand PA                           Clinical Impression:  Final diagnoses:  [L03.113] Cellulitis of right hand (Primary)          ED Disposition Condition    Discharge Stable          ED Prescriptions       Medication Sig Dispense Start Date End Date Auth. Provider    sulfamethoxazole-trimethoprim 800-160mg (BACTRIM DS) 800-160 mg Tab Take 1 tablet by mouth 2 (two) times daily. for 7 days 14 tablet 5/9/2024 5/16/2024 Jerri Bertrand PA          Follow-up Information       Follow up With Specialties Details Why Contact Info    [facility] - Emergency Dept Emergency Medicine In 2 days If symptoms worsen return to ED immediately, For wound re-check [address] [facility] [id number] [phone]    Adilene Dillon NP Family Medicine In 2 days  [address] [facility] [address] [phone]               Jerri Bertrand PA  05/09/24 1629

## 2024-06-24 PROBLEM — Z48.02 VISIT FOR SUTURE REMOVAL: Status: ACTIVE | Noted: 2024-06-24

## 2024-07-23 ENCOUNTER — TELEPHONE (OUTPATIENT)
Dept: ORTHOPEDICS | Facility: CLINIC | Age: 34
End: 2024-07-23
Payer: MEDICAID

## 2024-07-23 NOTE — TELEPHONE ENCOUNTER
Called PT about his appointment for 7/25, explained to him that [facility] does not treat that particular injury, and tried to offer an appointment with Dr. Granados and PT requested to cancel appointment and declined appointment with Dr. Granados

## 2024-10-08 ENCOUNTER — HOSPITAL ENCOUNTER (EMERGENCY)
Facility: HOSPITAL | Age: 34
Discharge: HOME | End: 2024-10-08
Attending: FAMILY MEDICINE
Payer: MEDICAID

## 2024-10-08 VITALS
DIASTOLIC BLOOD PRESSURE: 82 MMHG | HEART RATE: 102 BPM | BODY MASS INDEX: 20.4 KG/M2 | RESPIRATION RATE: 16 BRPM | SYSTOLIC BLOOD PRESSURE: 112 MMHG | WEIGHT: 130 LBS | TEMPERATURE: 98 F | HEIGHT: 67 IN | OXYGEN SATURATION: 98 %

## 2024-10-08 DIAGNOSIS — L03.90 CELLULITIS OF SKIN: Primary | ICD-10-CM

## 2024-10-08 DIAGNOSIS — F15.10 METHAMPHETAMINE ABUSE: ICD-10-CM

## 2024-10-08 PROCEDURE — 99284 EMERGENCY DEPT VISIT MOD MDM: CPT

## 2024-10-08 RX ORDER — CEPHALEXIN 500 MG/1
500 CAPSULE ORAL EVERY 6 HOURS
Qty: 28 CAPSULE | Refills: 0 | Status: SHIPPED | OUTPATIENT
Start: 2024-10-08 | End: 2024-10-15

## 2024-10-08 RX ORDER — MUPIROCIN 20 MG/G
OINTMENT TOPICAL 3 TIMES DAILY
Qty: 30 G | Refills: 0 | Status: SHIPPED | OUTPATIENT
Start: 2024-10-08 | End: 2024-10-18

## 2024-10-08 NOTE — ED PROVIDER NOTES
"Encounter Date: 10/8/2024       History     Chief Complaint   Patient presents with    medical evaluation     33 y/o male with a history of methamphetamine abuse, last use 2 days prior, presents with sores all over his body.  Patient reports he must have gotten "glass" within the wounds.  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  Patient reports "No, real powered glass, I can feel them under my skin of arms, face, and scalp."    The history is provided by the patient.     Review of patient's allergies indicates:  No Known Allergies  Past Medical History:   Diagnosis Date    Depression     Opioid abuse     Seizures     Solitary kidney, congenital     Unspecified viral hepatitis C without hepatic coma      Past Surgical History:   Procedure Laterality Date    INSERTION OF TUNNELED CENTRAL VENOUS HEMODIALYSIS CATHETER N/A 6/16/2023    Procedure: Insertion, Catheter, Central Venous, Hemodialysis;  Surgeon: Eliazar Lopes III, MD;  Location: [facility] CATH LAB;  Service: Peripheral Vascular;  Laterality: N/A;    TONSILLECTOMY       Family History   Problem Relation Name Age of Onset    Cerebral aneurysm Father       Social History     Tobacco Use    Smoking status: Every Day     Types: Vaping with nicotine    Smokeless tobacco: Current   Substance Use Topics    Alcohol use: Not Currently    Drug use: Not Currently     Types: Heroin, Methamphetamines     Comment: last used 2 months     Review of Systems   Constitutional:  Negative for chills, fatigue and fever.   HENT:  Negative for ear pain, rhinorrhea and sore throat.    Eyes:  Negative for photophobia and pain.   Respiratory:  Negative for cough, shortness of breath and wheezing.    Cardiovascular:  Negative for chest pain.   Gastrointestinal:  Negative for abdominal pain, diarrhea, nausea and vomiting.   Genitourinary:  Negative for dysuria.   Neurological:  Negative for dizziness, " weakness and headaches.   All other systems reviewed and are negative.      Physical Exam     Initial Vitals [10/08/24 0429]   BP Pulse Resp Temp SpO2   112/82 102 16 97.8 °F (36.6 °C) 98 %      MAP       --         Physical Exam    Nursing note and vitals reviewed.  Constitutional: He appears well-developed and well-nourished.   HENT:   Head: Normocephalic and atraumatic.   Eyes: EOM are normal. Pupils are equal, round, and reactive to light.   Neck: Neck supple.   Normal range of motion.  Cardiovascular:  Normal rate, regular rhythm and normal heart sounds.     Exam reveals no gallop and no friction rub.       No murmur heard.  Pulmonary/Chest: Breath sounds normal. No respiratory distress.   Abdominal: Abdomen is soft. Bowel sounds are normal. He exhibits no distension. There is no abdominal tenderness.   Musculoskeletal:         General: Normal range of motion.      Cervical back: Normal range of motion and neck supple.     Neurological: He is alert and oriented to person, place, and time. He has normal strength.   Skin:   Multiple sores on face, scalp, and bilateral upper extremities; patient constantly picking at the wounds while in the examination room.   Psychiatric: He has a normal mood and affect. His behavior is normal. Judgment and thought content normal.         ED Course   Procedures  Labs Reviewed - No data to display       Imaging Results    None          Medications - No data to display  Medical Decision Making  33 y/o male with history of substance abuse presenting with facial wounds, scalp wounds, and bilateral upper extremity wounds consistent with neurotic excoriations.  Patient given reassurance as there is no glass within these lesions (each of these lesions examined to evaluate for foreign bodies).  Patient informed these are likely secondary to methamphetamine abuse.  Patient reports understanding.  Patient is not interested in receiving any help at this time.  Will prescribe keflex and  mupirocin to prevent secondary infection.  Stable for discharge to home.  ER precautions for any acute worsening.    Risk  Prescription drug management.                                      Clinical Impression:  Final diagnoses:  [L03.90] Cellulitis of skin (Primary)  [F15.10] Methamphetamine abuse          ED Disposition Condition    Discharge Stable          ED Prescriptions       Medication Sig Dispense Start Date End Date Auth. Provider    mupirocin (BACTROBAN) 2 % ointment Apply topically 3 (three) times daily. for 10 days 30 g 10/8/2024 10/18/2024 Darren Maria MD    cephALEXin (KEFLEX) 500 MG capsule Take 1 capsule (500 mg total) by mouth every 6 (six) hours. for 7 days 28 capsule 10/8/2024 10/15/2024 Darren Maria MD          Follow-up Information       Follow up With Specialties Details Why Contact Info    Adilene Dillon NP Family Medicine   [address] [facility] [address] [phone]      [facility] - Emergency Dept Emergency Medicine  As needed, If symptoms worsen [address] [facility] [id number] [phone]             Darren Maria MD  10/08/24 2587

## 2024-10-25 ENCOUNTER — HOSPITAL ENCOUNTER (EMERGENCY)
Facility: HOSPITAL | Age: 34
Discharge: HOME | End: 2024-10-25
Attending: EMERGENCY MEDICINE
Payer: MEDICAID

## 2024-10-25 VITALS
HEIGHT: 67 IN | SYSTOLIC BLOOD PRESSURE: 146 MMHG | OXYGEN SATURATION: 98 % | RESPIRATION RATE: 20 BRPM | DIASTOLIC BLOOD PRESSURE: 89 MMHG | HEART RATE: 104 BPM | BODY MASS INDEX: 20.4 KG/M2 | WEIGHT: 130 LBS | TEMPERATURE: 98 F

## 2024-10-25 DIAGNOSIS — S61.411A LACERATION OF RIGHT HAND WITHOUT FOREIGN BODY, INITIAL ENCOUNTER: Primary | ICD-10-CM

## 2024-10-25 DIAGNOSIS — T07.XXXA MULTIPLE ABRASIONS: ICD-10-CM

## 2024-10-25 PROCEDURE — 12001 RPR S/N/AX/GEN/TRNK 2.5CM/<: CPT

## 2024-10-25 PROCEDURE — 99284 EMERGENCY DEPT VISIT MOD MDM: CPT | Mod: 25

## 2024-10-25 RX ORDER — CEPHALEXIN 500 MG/1
500 CAPSULE ORAL 4 TIMES DAILY
Qty: 20 CAPSULE | Refills: 0 | Status: SHIPPED | OUTPATIENT
Start: 2024-10-25 | End: 2024-10-30

## 2024-10-25 RX ORDER — MUPIROCIN 20 MG/G
OINTMENT TOPICAL 3 TIMES DAILY
Qty: 22 G | Refills: 1 | Status: SHIPPED | OUTPATIENT
Start: 2024-10-25 | End: 2024-11-01

## 2024-11-20 ENCOUNTER — HOSPITAL ENCOUNTER (EMERGENCY)
Facility: HOSPITAL | Age: 34
Discharge: HOME | End: 2024-11-20
Attending: EMERGENCY MEDICINE
Payer: MEDICAID

## 2024-11-20 VITALS
SYSTOLIC BLOOD PRESSURE: 145 MMHG | WEIGHT: 143.5 LBS | RESPIRATION RATE: 16 BRPM | TEMPERATURE: 98 F | BODY MASS INDEX: 22.52 KG/M2 | HEIGHT: 67 IN | DIASTOLIC BLOOD PRESSURE: 86 MMHG | OXYGEN SATURATION: 96 % | HEART RATE: 103 BPM

## 2024-11-20 DIAGNOSIS — Z00.8 MEDICAL CLEARANCE FOR INCARCERATION: Primary | ICD-10-CM

## 2024-11-20 PROCEDURE — 99281 EMR DPT VST MAYX REQ PHY/QHP: CPT
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
## (undated) DEVICE — APPLICATOR CHLORAPREP CLR 10.5

## (undated) DEVICE — OMNIPAQUE 350MG 150ML VIAL

## (undated) DEVICE — MARKER WRITESITE SKIN CHLRAPRP

## (undated) DEVICE — Device

## (undated) DEVICE — SUT 2-0 ETHILON 18 FS

## (undated) DEVICE — DRESSING TEGADERM CHG 4X7IN